# Patient Record
Sex: FEMALE | Race: BLACK OR AFRICAN AMERICAN | NOT HISPANIC OR LATINO | Employment: OTHER | URBAN - METROPOLITAN AREA
[De-identification: names, ages, dates, MRNs, and addresses within clinical notes are randomized per-mention and may not be internally consistent; named-entity substitution may affect disease eponyms.]

---

## 2017-02-20 ENCOUNTER — OFFICE VISIT (OUTPATIENT)
Dept: INTERNAL MEDICINE | Facility: CLINIC | Age: 62
End: 2017-02-20
Payer: COMMERCIAL

## 2017-02-20 VITALS
WEIGHT: 211.44 LBS | SYSTOLIC BLOOD PRESSURE: 130 MMHG | HEIGHT: 61 IN | RESPIRATION RATE: 14 BRPM | BODY MASS INDEX: 39.92 KG/M2 | DIASTOLIC BLOOD PRESSURE: 70 MMHG | HEART RATE: 64 BPM | TEMPERATURE: 98 F

## 2017-02-20 DIAGNOSIS — M25.512 LEFT SHOULDER PAIN, UNSPECIFIED CHRONICITY: Primary | ICD-10-CM

## 2017-02-20 PROCEDURE — 99213 OFFICE O/P EST LOW 20 MIN: CPT | Mod: S$GLB,,, | Performed by: INTERNAL MEDICINE

## 2017-02-20 PROCEDURE — 3078F DIAST BP <80 MM HG: CPT | Mod: S$GLB,,, | Performed by: INTERNAL MEDICINE

## 2017-02-20 PROCEDURE — 3075F SYST BP GE 130 - 139MM HG: CPT | Mod: S$GLB,,, | Performed by: INTERNAL MEDICINE

## 2017-02-20 PROCEDURE — 99999 PR PBB SHADOW E&M-EST. PATIENT-LVL IV: CPT | Mod: PBBFAC,,, | Performed by: INTERNAL MEDICINE

## 2017-02-20 PROCEDURE — 1160F RVW MEDS BY RX/DR IN RCRD: CPT | Mod: S$GLB,,, | Performed by: INTERNAL MEDICINE

## 2017-02-20 RX ORDER — HYDROCODONE BITARTRATE AND ACETAMINOPHEN 5; 325 MG/1; MG/1
1 TABLET ORAL EVERY 6 HOURS PRN
Qty: 30 TABLET | Refills: 0 | Status: SHIPPED | OUTPATIENT
Start: 2017-02-20 | End: 2017-04-18

## 2017-02-20 NOTE — PROGRESS NOTES
CC: neck and left shoulder pain  HPI:  The patient is a 61 y.o. year old female who presents to the office for neck and left shoulder pain.  Her symptoms started last Thursday while asleep.  The pain awakened her from sleep.  She reports heat and flexeril were ineffective.  She also tried aleve, bio-freeze and tramadol.  Pain is 10/10, and is a stabbing, throbbing pain.  She sleeps on the sofa.  The patient is right hand dominant.      PAST MEDICAL HISTORY:  Past Medical History   Diagnosis Date    Adjustment disorder with mixed anxiety and depressed mood     Diverticulitis of colon (without mention of hemorrhage)     Diverticulosis of colon (without mention of hemorrhage)     HLD (hyperlipidemia)     HTN (hypertension)     Lumbago     Mitral valve disorder     Mitral valve disorders(424.0)     Pain in joint, shoulder region     Sarcoidosis        SURGICAL HISTORY:  Past Surgical History   Procedure Laterality Date    Total abdominal hysterectomy w/ bilateral salpingoophorectomy      Vocal cord noduules         MEDS:  Medcard reviewed and updated    ALLERGIES: Allergy Card reviewed and updated    SOCIAL HISTORY:   The patient is a nonsmoker.    PE:   APPEARANCE: Well nourished, well developed, in no acute distress.    CHEST: Lungs clear to auscultation with unlabored respirations.  CARDIOVASCULAR: Normal S1, S2. No murmurs. No carotid bruits. No pedal edema.  ABDOMEN: Bowel sounds normal. Not distended. Soft. No tenderness or masses.   MUSCULOSKELETAL:  Pain with internal rotation of left arm.  Positive swelling of superior aspect of left shoulder.  PSYCHIATRIC: The patient is oriented to person, place, and time and has a pleasant affect.        ASSESSMENT/PLAN:  Rubia was seen today for l shoulder and neck pain.    Diagnoses and all orders for this visit:    Left shoulder pain, unspecified chronicity  -     Cancel: X-Ray Shoulder 2 or More Views Left; Future  -     Ambulatory Referral to Sports  Medicine  -     Prescribed Norco    Other orders  -     hydrocodone-acetaminophen 5-325mg (NORCO) 5-325 mg per tablet; Take 1 tablet by mouth every 6 (six) hours as needed for Pain.

## 2017-02-20 NOTE — MR AVS SNAPSHOT
[facility]   [facility]  [place] [address]  Phone: [phone]  Fax: [phone]                  Rubia Mathews   2017 2:40 PM   Office Visit    Description:  Female : 1955   Provider:  Kiki Delgado MD   Department:  [facility]           Reason for Visit     L shoulder and neck pain           Diagnoses this Visit        Comments    Left shoulder pain, unspecified chronicity    -  Primary            To Do List           Goals (5 Years of Data)     None       These Medications        Disp Refills Start End    hydrocodone-acetaminophen 5-325mg (NORCO) 5-325 mg per tablet 30 tablet 0 2017     Take 1 tablet by mouth every 6 (six) hours as needed for Pain. - Oral    Pharmacy: [facility] [address] [place] [address] AT [facility] [address] Ph #: [phone]         [facility] On Call     [facility] On Call Nurse Care Line -  Assistance  Registered nurses in the [facility] On Call Center provide clinical advisement, health education, appointment booking, and other advisory services.  Call for this free service at [phone].             Medications           Message regarding Medications     Verify the changes and/or additions to your medication regime listed below are the same as discussed with your clinician today.  If any of these changes or additions are incorrect, please notify your healthcare provider.        START taking these NEW medications        Refills    hydrocodone-acetaminophen 5-325mg (NORCO) 5-325 mg per tablet 0    Sig: Take 1 tablet by mouth every 6 (six) hours as needed for Pain.    Class: Print    Route: Oral           Verify that the below list of medications is an accurate representation of the medications you are currently taking.  If none reported, the list may be blank. If incorrect, please contact your healthcare provider. Carry this list with you in case of  "emergency.           Current Medications     amlodipine (NORVASC) 10 MG tablet Take 1 tablet (10 mg total) by mouth once daily.    carvedilol (COREG) 6.25 MG tablet Take 1 tablet (6.25 mg total) by mouth 2 (two) times daily.    cyclobenzaprine (FLEXERIL) 10 MG tablet Take 1 tablet (10 mg total) by mouth 3 (three) times daily as needed for Muscle spasms.    levocetirizine (XYZAL) 5 MG tablet Take 1 tablet (5 mg total) by mouth daily as needed (sinus or cold symptoms).    triamterene-hydrochlorothiazide 37.5-25 mg (MAXZIDE-25) 37.5-25 mg per tablet TAKE 1 TABLET BY MOUTH ONCE DAILY    hydrocodone-acetaminophen 5-325mg (NORCO) 5-325 mg per tablet Take 1 tablet by mouth every 6 (six) hours as needed for Pain.           Clinical Reference Information           Your Vitals Were     BP Pulse Temp Resp Height Weight    130/70 (BP Location: Left arm, Patient Position: Sitting, BP Method: Manual) 64 98.2 °F (36.8 °C) (Oral) 14 5' 1" (1.549 m) 95.9 kg (211 lb 6.7 oz)    BMI                39.95 kg/m2          Blood Pressure          Most Recent Value    BP  130/70      Allergies as of 2/20/2017     Ciprofloxacin    Lisinopril    Losartan    Zithromax  [Azithromycin]      Immunizations Administered on Date of Encounter - 2/20/2017     None      Orders Placed During Today's Visit      Normal Orders This Visit    Ambulatory Referral to Sports Medicine     Future Labs/Procedures Expected by Expires    X-Ray Shoulder 2 or More Views Left  2/20/2017 2/20/2018      Smoking Cessation     If you would like to quit smoking:   You may be eligible for free services if you are a [place] resident and started smoking cigarettes before September 1, 1988.  Call the Smoking Cessation Trust (SCT) toll free at (774) 075-1002 or (482) 133-5822.   Call 3-611-QUIT-NOW if you do not meet the above criteria.            Language Assistance Services     ATTENTION: Language assistance services are available, free of charge. Please call " [phone].      ATENCIÓN: Si habla español, tiene a jones disposición servicios gratuitos de asistencia lingüística. Llame al [phone].     CHÚ Ý: N?u b?n nói Ti?ng Vi?t, có các d?ch v? h? tr? ngôn ng? mi?n phí dành cho b?n. G?i s? [phone].         [facility] complies with applicable Federal civil rights laws and does not discriminate on the basis of race, color, national origin, age, disability, or sex.

## 2017-04-18 ENCOUNTER — OFFICE VISIT (OUTPATIENT)
Dept: INTERNAL MEDICINE | Facility: CLINIC | Age: 62
End: 2017-04-18
Payer: COMMERCIAL

## 2017-04-18 VITALS
BODY MASS INDEX: 39.21 KG/M2 | DIASTOLIC BLOOD PRESSURE: 79 MMHG | SYSTOLIC BLOOD PRESSURE: 120 MMHG | RESPIRATION RATE: 16 BRPM | WEIGHT: 207.69 LBS | HEART RATE: 79 BPM | HEIGHT: 61 IN | TEMPERATURE: 98 F

## 2017-04-18 DIAGNOSIS — R21 RASH AND NONSPECIFIC SKIN ERUPTION: Primary | ICD-10-CM

## 2017-04-18 PROCEDURE — 99213 OFFICE O/P EST LOW 20 MIN: CPT | Mod: S$GLB,,, | Performed by: INTERNAL MEDICINE

## 2017-04-18 PROCEDURE — 3078F DIAST BP <80 MM HG: CPT | Mod: S$GLB,,, | Performed by: INTERNAL MEDICINE

## 2017-04-18 PROCEDURE — 99999 PR PBB SHADOW E&M-EST. PATIENT-LVL III: CPT | Mod: PBBFAC,,, | Performed by: INTERNAL MEDICINE

## 2017-04-18 PROCEDURE — 1160F RVW MEDS BY RX/DR IN RCRD: CPT | Mod: S$GLB,,, | Performed by: INTERNAL MEDICINE

## 2017-04-18 PROCEDURE — 3074F SYST BP LT 130 MM HG: CPT | Mod: S$GLB,,, | Performed by: INTERNAL MEDICINE

## 2017-04-18 RX ORDER — TRIAMCINOLONE ACETONIDE 1 MG/G
CREAM TOPICAL 2 TIMES DAILY
Qty: 15 G | Refills: 0 | Status: SHIPPED | OUTPATIENT
Start: 2017-04-18 | End: 2018-02-16

## 2017-04-18 RX ORDER — NYSTATIN 100000 U/G
CREAM TOPICAL 3 TIMES DAILY
Qty: 15 G | Refills: 0 | Status: SHIPPED | OUTPATIENT
Start: 2017-04-18 | End: 2018-03-28

## 2017-04-18 NOTE — PROGRESS NOTES
CC: rash    62 yo female w/ HTN, OA, and HLD, presents for rash on left arm and breasts  Started: several months, 1st darker spot on left arm in antecubital area, it throbs @ night  2 lesions on breast that do not throb or itch  Also noted soreness along old GORDON incision  Itch: yes  Blisters: no clear , watery blister  Exudate: n/a  Contact: none known  Tx: antifungal cream/soap; triple antibiotic, HC , witch hazel ( only thing that helped)    MEDCARD: Reviewed  ROS:  No fever, chills, or night sweats  No paresthesias or change in temperature  No red streaks or abscess  ROR negative except as previously noted    PMHX:Reviewed  SHX: Reviewed  FHX: Reviewed    PE:  VSS  GEN: WDWN, A&O, NAD, conversant and co-operative  Pleasant as always  EYES: Conj /lids unremarkable; sclera nonanicteric  MSK: Gait nl  NEURO: MALDONADO< no tremor noted  SKIN: ledt antecubital area, ` 1-2 cm , well circummscribed lesion w/ slight scaly appearence and very center slightly clear; raised w/o blisters  2 smaller lesions on the left breast area, similar to arm  Scar perineal @ left distal end, scaly appearance, and hyperpigmented appearance    IMPRESSION:  Rash,  HTN, stable today    PLAN:  Rx nysttain   Rx triamcinolone 1%  Mix and apply tid  Derm Consult  Call prn

## 2017-04-30 RX ORDER — AMLODIPINE BESYLATE 10 MG/1
TABLET ORAL
Qty: 90 TABLET | Refills: 0 | Status: SHIPPED | OUTPATIENT
Start: 2017-04-30 | End: 2017-08-25 | Stop reason: SDUPTHER

## 2017-04-30 RX ORDER — CARVEDILOL 6.25 MG/1
TABLET ORAL
Qty: 180 TABLET | Refills: 0 | Status: SHIPPED | OUTPATIENT
Start: 2017-04-30 | End: 2017-08-25 | Stop reason: SDUPTHER

## 2017-05-20 RX ORDER — TRIAMTERENE/HYDROCHLOROTHIAZID 37.5-25 MG
TABLET ORAL
Qty: 90 TABLET | Refills: 1 | Status: SHIPPED | OUTPATIENT
Start: 2017-05-20 | End: 2017-11-19 | Stop reason: SDUPTHER

## 2017-07-08 ENCOUNTER — OFFICE VISIT (OUTPATIENT)
Dept: INTERNAL MEDICINE | Facility: CLINIC | Age: 62
End: 2017-07-08
Payer: COMMERCIAL

## 2017-07-08 VITALS
HEART RATE: 70 BPM | TEMPERATURE: 98 F | WEIGHT: 203.5 LBS | SYSTOLIC BLOOD PRESSURE: 143 MMHG | DIASTOLIC BLOOD PRESSURE: 82 MMHG | BODY MASS INDEX: 38.42 KG/M2 | HEIGHT: 61 IN

## 2017-07-08 DIAGNOSIS — R22.9 SUBCUTANEOUS NODULE: Primary | ICD-10-CM

## 2017-07-08 DIAGNOSIS — Z12.31 VISIT FOR SCREENING MAMMOGRAM: ICD-10-CM

## 2017-07-08 PROCEDURE — 99999 PR PBB SHADOW E&M-EST. PATIENT-LVL III: CPT | Mod: PBBFAC,,, | Performed by: INTERNAL MEDICINE

## 2017-07-08 PROCEDURE — 99214 OFFICE O/P EST MOD 30 MIN: CPT | Mod: S$GLB,,, | Performed by: INTERNAL MEDICINE

## 2017-07-08 NOTE — PROGRESS NOTES
Subjective:        Patient ID: Rubia Mathews is a 62 y.o. female.    Chief Complaint: Breast Problem (knot on left breast x 3 days)    HPI   Rubia Mathews presents with c/o a bump on the underside of her left breast that she first noticed 3 days ago.  Initially the lesion was throbbing, painful and red.  She applied warm compresses thinking it was an ingrown hair or boil and the throbbing pain has now resolved.  Pt thinks the lesion is now smaller also.  Pt reports a hx of boils and cysts on other parts of her body.  She reports doing SBEs but not at regular intervals.  She is also due for her mammogram.    Review of Systems  as per HPI      Objective:        Vitals:    07/08/17 1030   BP: (!) 143/82   Pulse: 70   Temp: 98.3 °F (36.8 °C)     Physical Exam   Constitutional: She is oriented to person, place, and time. She appears well-developed and well-nourished. No distress.   Pulmonary/Chest:       Neurological: She is alert and oriented to person, place, and time.   Skin: No rash noted. No erythema.   Vitals reviewed.          Assessment:         1. Subcutaneous nodule    2. Visit for screening mammogram              Plan:         Rubia was seen today for breast problem.    Diagnoses and all orders for this visit:    Subcutaneous nodule: Most likely furunculosis; improved w conservative treatment.  Continue warm compresses, keep skin clean and dry.  Follow up with PCP in 1 week if lesion gets worse or does not continue to improve.    Visit for screening mammogram  -     Mammo Digital Screening Bilateral With CAD; Future        Return PRN

## 2017-08-27 RX ORDER — AMLODIPINE BESYLATE 10 MG/1
TABLET ORAL
Qty: 90 TABLET | Refills: 0 | Status: SHIPPED | OUTPATIENT
Start: 2017-08-27 | End: 2017-12-14 | Stop reason: SDUPTHER

## 2017-08-27 RX ORDER — CARVEDILOL 6.25 MG/1
TABLET ORAL
Qty: 180 TABLET | Refills: 0 | Status: SHIPPED | OUTPATIENT
Start: 2017-08-27 | End: 2017-12-14 | Stop reason: SDUPTHER

## 2017-11-14 ENCOUNTER — OFFICE VISIT (OUTPATIENT)
Dept: INTERNAL MEDICINE | Facility: CLINIC | Age: 62
End: 2017-11-14
Payer: COMMERCIAL

## 2017-11-14 VITALS
TEMPERATURE: 98 F | RESPIRATION RATE: 16 BRPM | WEIGHT: 213.19 LBS | DIASTOLIC BLOOD PRESSURE: 70 MMHG | SYSTOLIC BLOOD PRESSURE: 138 MMHG | BODY MASS INDEX: 40.25 KG/M2 | HEIGHT: 61 IN | HEART RATE: 80 BPM

## 2017-11-14 DIAGNOSIS — I10 ESSENTIAL HYPERTENSION: ICD-10-CM

## 2017-11-14 DIAGNOSIS — J30.9 ACUTE ALLERGIC RHINITIS, UNSPECIFIED SEASONALITY, UNSPECIFIED TRIGGER: Primary | ICD-10-CM

## 2017-11-14 PROCEDURE — 99999 PR PBB SHADOW E&M-EST. PATIENT-LVL III: CPT | Mod: PBBFAC,,, | Performed by: INTERNAL MEDICINE

## 2017-11-14 PROCEDURE — 96372 THER/PROPH/DIAG INJ SC/IM: CPT | Mod: S$GLB,,, | Performed by: INTERNAL MEDICINE

## 2017-11-14 PROCEDURE — 99213 OFFICE O/P EST LOW 20 MIN: CPT | Mod: 25,S$GLB,, | Performed by: INTERNAL MEDICINE

## 2017-11-14 RX ORDER — TRIAMCINOLONE ACETONIDE 40 MG/ML
40 INJECTION, SUSPENSION INTRA-ARTICULAR; INTRAMUSCULAR
Status: COMPLETED | OUTPATIENT
Start: 2017-11-14 | End: 2017-11-14

## 2017-11-14 RX ADMIN — TRIAMCINOLONE ACETONIDE 40 MG: 40 INJECTION, SUSPENSION INTRA-ARTICULAR; INTRAMUSCULAR at 12:11

## 2017-11-14 NOTE — PROGRESS NOTES
CC: sore throat    62 y.o. female presents today for sore throat, more scratchy now, ++ PND  Sx started: Saurday  Sx : sore throat , PND , nasal congestion w/ some mucus- clear; itchy, watery eyes  Tx: Mucinex, salt water +ASA + pepper gargle, tea w/ honey and lemon, Zyrtec  Denied fever, chills , or night sweats  Denied ear pain or sinus pain  Denied HA or dizziness  ++ cough and wheezing  Ill contacts , none    Co- morbiditiy: HTN , tx amlodipine and carvedilol and Maxzide- BP nl today    ROS:  No nausea or emesis  No abdominal pain or diarrhea  Remainder of review negative except as previously noted    PMHX: Reviewed  PSHX: Reviewed  SHX: Reviewed===Nonsmoker  FHX: Reviewed    PE:  VS as noted  GEN:WDWN, A&O, NAD, pleasant as always  EYES: Conj/lids unremarkable, sclera anicteric, Pupils reactive  ENT: Canals w/o cerumen, TM's injected;   nasal mucosa/turbinates boggy w/ left touching w/ clear exudate  o/p     Injected, w/o exudate;   sinus NT  NECK: Supple w/o thyromegaly or lymphadenopathy  RESPIRATORY: Efforts are unlabored, lungs are CTA  CARDIOVASCULAR: Heart RRR, no LE edema  MUSCULOSKELETAL: Gait normal. No CCE.  NEUROLOGIC: MALDONADO. No tremor noted  SKIN: Warm and dry    IMPRESSION:  Allergic rhinitis, exacerbated  HTN, stable    PLAN:  Kenalog 40mg  FLonase after Ocean spray  Mucinex  Vigorous hydration  Call prn

## 2017-11-17 ENCOUNTER — TELEPHONE (OUTPATIENT)
Dept: INTERNAL MEDICINE | Facility: CLINIC | Age: 62
End: 2017-11-17

## 2017-11-17 NOTE — TELEPHONE ENCOUNTER
----- Message from Yoly Alvarado sent at 11/17/2017  8:51 AM CST -----  Contact: Pt at [phone]  Patient would like to get medical advice.  Symptoms (please be specific):  Congestion,coughing up flem  How long has patient had these symptoms: over 1 week  Pharmacy name and phone #:  [facility] [address] [place] [address] AT [facility] [address]   [phone] (Phone)  [phone] (Fax)      Any drug allergies:  See chart  Comments: Pt received a steroid injection on 11/14, but it has not helped with her symptoms.

## 2017-11-17 NOTE — TELEPHONE ENCOUNTER
Spoke to pt. Pt stated that she sometimes coughs a lot, because she feels phlegm building up. She stated that she has trouble coughing up the phlegm.  I advised that she try taking plain Mucinex OTC and increase water intake.   Pt stated that she just started that mucinex last night. She stated that she will continue, and let us know if she needs us.

## 2017-11-19 RX ORDER — TRIAMTERENE/HYDROCHLOROTHIAZID 37.5-25 MG
TABLET ORAL
Qty: 90 TABLET | Refills: 1 | Status: SHIPPED | OUTPATIENT
Start: 2017-11-19 | End: 2018-04-18 | Stop reason: SDUPTHER

## 2017-12-08 ENCOUNTER — TELEPHONE (OUTPATIENT)
Dept: INTERNAL MEDICINE | Facility: CLINIC | Age: 62
End: 2017-12-08

## 2017-12-08 NOTE — TELEPHONE ENCOUNTER
----- Message from Ana Paula Patel sent at 12/8/2017  9:13 AM CST -----  Contact: self/[phone]  Patient called in regards needing to talk with Dr Edwards medical assistant on getting an early appointment. Patient was told that she need cataract removal and need a referral (i offered the earliest appointment 01/31/18, but patient stated that she need it earlier than that). Please call and advise.       Thank you!!!

## 2017-12-08 NOTE — TELEPHONE ENCOUNTER
Spoke to pt. Pt stated that she needed to be seen by Dr. penny because her outside eye dr told her that she needed to be scheduled for an eye surgeon to have a cataract removed.  Pt scheduled to see Dr. Penny on 12/12/17.

## 2017-12-12 ENCOUNTER — OFFICE VISIT (OUTPATIENT)
Dept: INTERNAL MEDICINE | Facility: CLINIC | Age: 62
End: 2017-12-12
Payer: COMMERCIAL

## 2017-12-12 VITALS
HEIGHT: 61 IN | WEIGHT: 210.75 LBS | TEMPERATURE: 98 F | RESPIRATION RATE: 16 BRPM | BODY MASS INDEX: 39.79 KG/M2 | SYSTOLIC BLOOD PRESSURE: 144 MMHG | DIASTOLIC BLOOD PRESSURE: 86 MMHG | OXYGEN SATURATION: 99 % | HEART RATE: 87 BPM

## 2017-12-12 DIAGNOSIS — E87.6 HYPOKALEMIA: ICD-10-CM

## 2017-12-12 DIAGNOSIS — E78.5 HYPERLIPIDEMIA, UNSPECIFIED HYPERLIPIDEMIA TYPE: ICD-10-CM

## 2017-12-12 DIAGNOSIS — H26.9 CATARACT OF BOTH EYES, UNSPECIFIED CATARACT TYPE: Primary | ICD-10-CM

## 2017-12-12 DIAGNOSIS — I10 HYPERTENSION, UNSPECIFIED TYPE: ICD-10-CM

## 2017-12-12 PROCEDURE — 99999 PR PBB SHADOW E&M-EST. PATIENT-LVL III: CPT | Mod: PBBFAC,,, | Performed by: INTERNAL MEDICINE

## 2017-12-12 PROCEDURE — 99214 OFFICE O/P EST MOD 30 MIN: CPT | Mod: S$GLB,,, | Performed by: INTERNAL MEDICINE

## 2017-12-12 NOTE — PROGRESS NOTES
CC: HTN, and cataracts     62 y.o. female presents for   Cataracts, interfering w/ her vision  Nighttime driving impaired     HTN, Tx : amlodipine 10mg , carvedilol 6.25mg BID  and Maxzide  Denied HA or dizziness  BP today==>144/86    Hypokalemia, tx diet /OTC  Denied cramping or palpitations  Potassium level==> pending    HLD, tx diet  Denied angina or GUY  LDL==> pending    MEDCARD: Reviewed  ROS:  No fever, chills ,or night sweats  No chest pain or palpitations  No cough or wheezing  No focal deficits or paresthesia  Remainder of review negative except as previously noted    PMHX: Reviewed  SHX: Reviewed  FHX: Reviewed    PE:  VSS:   GEN: WDWN, A&O, NAD, conversant and co-operative  Pleasant pt  EYES: Conj/lids unremarkable, sclera anicteric, Pupils reactive  ENT: O/P w/o exudate or erythema  NECK: Supple w/o lymphadenopathy or thyromegaly  RESP: Efforts unlabored, lungs CTA  Cardiovascular: Heart RRR, no carotid bruits,   1+ pedal pulses, no edema  MSK: Gait normal. No CCE  NEURO: MALDONADO. No tremor or facial asymmetry  SKIN: warm and dry    IMPRESSION:  Cataracts, b/l  HTN, elevated  Hypokalemia, improved last check but not nl  HLD, asx    PLAN:  Mammo update requested  Continue present meds  Rx update  Lab - fasting  Avoid salt  Monitor BP and call if remains elevated  Call prn

## 2017-12-14 ENCOUNTER — TELEPHONE (OUTPATIENT)
Dept: INTERNAL MEDICINE | Facility: CLINIC | Age: 62
End: 2017-12-14

## 2017-12-14 DIAGNOSIS — H26.9 CATARACT, UNSPECIFIED CATARACT TYPE, UNSPECIFIED LATERALITY: Primary | ICD-10-CM

## 2017-12-14 RX ORDER — CARVEDILOL 6.25 MG/1
TABLET ORAL
Qty: 180 TABLET | Refills: 1 | Status: SHIPPED | OUTPATIENT
Start: 2017-12-14 | End: 2018-06-17 | Stop reason: SDUPTHER

## 2017-12-14 RX ORDER — AMLODIPINE BESYLATE 10 MG/1
TABLET ORAL
Qty: 90 TABLET | Refills: 1 | Status: SHIPPED | OUTPATIENT
Start: 2017-12-14 | End: 2018-06-17 | Stop reason: SDUPTHER

## 2017-12-14 NOTE — TELEPHONE ENCOUNTER
----- Message from Leann Olivera sent at 12/14/2017  2:00 PM CST -----  Regarding: RE: javan  Please enter order.    Thanks  ----- Message -----  From: Rohini Whitney LPN  Sent: 12/14/2017   1:51 PM  To: [facility] Referral Coordinators  Subject: javan Noriega-  Yes.  Referral to ophthalmology with Lawanda Ellis or Alyson for cataract surgery.  Thanks

## 2017-12-14 NOTE — TELEPHONE ENCOUNTER
----- Message from Leann Olivera sent at 12/14/2017 10:02 AM CST -----  Contact: Self. Call her at  [phone]  Is pt being referred?    Thanks  ----- Message -----  From: Alisia Brito  Sent: 12/14/2017   9:58 AM  To: Leann Olivera    GM, Did Dr. Penny give you a referral request for this patient to see an eye doctor for cataracts?

## 2018-01-08 ENCOUNTER — OFFICE VISIT (OUTPATIENT)
Dept: OPHTHALMOLOGY | Facility: CLINIC | Age: 63
End: 2018-01-08
Payer: COMMERCIAL

## 2018-01-08 DIAGNOSIS — H25.12 NUCLEAR SCLEROTIC CATARACT OF LEFT EYE: ICD-10-CM

## 2018-01-08 DIAGNOSIS — H25.11 NUCLEAR SCLEROTIC CATARACT OF RIGHT EYE: Primary | ICD-10-CM

## 2018-01-08 DIAGNOSIS — H52.13 MYOPIA OF BOTH EYES: ICD-10-CM

## 2018-01-08 PROCEDURE — 99999 PR PBB SHADOW E&M-EST. PATIENT-LVL II: CPT | Mod: PBBFAC,,, | Performed by: OPHTHALMOLOGY

## 2018-01-08 PROCEDURE — 92025 CPTRIZED CORNEAL TOPOGRAPHY: CPT | Mod: S$GLB,,, | Performed by: OPHTHALMOLOGY

## 2018-01-08 PROCEDURE — 92136 OPHTHALMIC BIOMETRY: CPT | Mod: RT,S$GLB,, | Performed by: OPHTHALMOLOGY

## 2018-01-08 PROCEDURE — 92004 COMPRE OPH EXAM NEW PT 1/>: CPT | Mod: S$GLB,,, | Performed by: OPHTHALMOLOGY

## 2018-01-08 RX ORDER — OFLOXACIN 3 MG/ML
1 SOLUTION/ DROPS OPHTHALMIC 3 TIMES DAILY
Qty: 5 ML | Refills: 0 | Status: SHIPPED | OUTPATIENT
Start: 2018-01-08 | End: 2018-01-18

## 2018-01-08 RX ORDER — KETOCONAZOLE 20 MG/G
CREAM TOPICAL
Refills: 10 | COMMUNITY
Start: 2017-12-14 | End: 2018-03-28

## 2018-01-08 RX ORDER — PREDNISOLONE ACETATE 10 MG/ML
1 SUSPENSION/ DROPS OPHTHALMIC 3 TIMES DAILY
Qty: 5 ML | Refills: 1 | Status: SHIPPED | OUTPATIENT
Start: 2018-01-08 | End: 2018-01-08 | Stop reason: SDUPTHER

## 2018-01-08 RX ORDER — PREDNISOLONE ACETATE 10 MG/ML
1 SUSPENSION/ DROPS OPHTHALMIC 3 TIMES DAILY
Qty: 5 ML | Refills: 1 | Status: SHIPPED | OUTPATIENT
Start: 2018-01-08 | End: 2018-02-07

## 2018-01-08 RX ORDER — OFLOXACIN 3 MG/ML
1 SOLUTION/ DROPS OPHTHALMIC 3 TIMES DAILY
Qty: 5 ML | Refills: 0 | Status: SHIPPED | OUTPATIENT
Start: 2018-01-08 | End: 2018-01-08 | Stop reason: SDUPTHER

## 2018-01-08 NOTE — LETTER
[facility]  [address]  [place] [address]  Phone: [phone]  Fax: [phone]   January 8, 2018    Jess Quintana, OD  [address] [facility] [address]    Patient: Rubia Mathews   MR Number: [id number]   YOB: 1955   Date of Visit: 1/8/2018       Dear Dr. Quintana:    Thank you for referring Rubia Mathews to me for evaluation. Here is my assessment and plan of care:    Nuclear sclerotic cataract of right eye  Nuclear sclerotic cataract of left eye  Myopia of both eyes    Visually significant nuclear sclerotic cataract OD>OS  - Interfering with activities of daily living.  Pt desires cataract surgery for [facility] rehabilitation.   - R/B/A discussed and pt agrees to proceed with surgery.   - IOL options discussed according to patient's goals and concomitant ocular pathology; and pt content with monofocal lens.    - Target: plano.    OD first    Need 2 wk CL holiday to confirm IOL calcs.     Will refer back to Dr. Quintana for post-op Mrx following cataract surgery.    Below you will find my full exam findings. If you have questions, please do not hesitate to call me. I look forward to following Ms. Rubia Mathews along with you.    Sincerely,        Veronica Ferrari MD       CC  No Recipients             Base Eye Exam     Visual Acuity (Snellen - Linear)       Right Left    Dist cc 20/70 -1 20/50 -1    Near cc J4 J4    Correction:  Contacts          Tonometry (Tonopen, 1:52 PM)       Right Left    Pressure 19 20          Pupils       Pupils React APD    Right PERRL Brisk None    Left PERRL Brisk None          Neuro/Psych     Oriented x3:  Yes    Mood/Affect:  Normal          Dilation     Both eyes:  1% Mydriacyl, 2.5% Phenylephrine @ 1:53 PM            Slit Lamp and Fundus Exam     Slit Lamp Exam       Right Left    Lids/Lashes Normal Normal    Conjunctiva/Sclera White and quiet White and quiet    Cornea Clear Clear    Anterior Chamber Deep and  quiet Deep and quiet    Iris Round and reactive Round and reactive    Lens 2+ NSC/ ant cortical cat 2+ NSC/ ant cortical cat    Vitreous Normal Normal          Fundus Exam       Right Left    Disc Normal Normal    Macula Normal Normal    Vessels Normal Normal    Periphery Normal Normal            Refraction     Manifest Refraction (Auto)       Sphere Cylinder Axis    Right -7.50 +0.25 150    Left -7.00 sph             Contact Lens Exam     Current Contact Lens Rx       Brand Sphere    Right Pure vision 2 -6.50    Left Pure vision 2 -6.50          Contact History     WTT:  24    Days/week:  2    Replacement Frequency:  Monthly    Care Regimen:  Normal    Solutions Used:  OptiFree PureMoist    Lens Age:  1m

## 2018-01-08 NOTE — PROGRESS NOTES
HPI     Ref. By Dr. Goodrich primary care    +1.25 readers    Last edited by Ej Ricks MA on 1/8/2018  1:44 PM. (History)            Assessment /Plan     For exam results, see Encounter Report.    Nuclear sclerotic cataract of right eye  -     IOL Master - OD - Right Eye  -     Computerized corneal topography    Nuclear sclerotic cataract of left eye    Myopia of both eyes    Other orders  -     prednisoLONE acetate (PRED FORTE) 1 % DrpS; Place 1 drop into the right eye 3 (three) times daily.  Dispense: 5 mL; Refill: 1  -     ofloxacin (OCUFLOX) 0.3 % ophthalmic solution; Place 1 drop into the right eye 3 (three) times daily.  Dispense: 5 mL; Refill: 0      Visually significant nuclear sclerotic cataract   - Interfering with activities of daily living.  Pt desires cataract surgery for Va rehabilitation.   - R/B/A discussed and pt agrees to proceed with surgery.   - IOL options discussed according to patient's goals and concomitant ocular pathology; and pt content with monofocal lens.    - Target: plano.    OD first    Need 2 wk CL holiday --> confirm calcs:    pcboo 12.5 OD    (pcboo 13.0 OS)    Myopia

## 2018-01-08 NOTE — LETTER
January 8, 2018      No Recipients           [facility]  [address]  [facility] [address]  Phone: [phone]  Fax: [phone]          Patient: Rubia Mathews   MR Number: [id number]   YOB: 1955   Date of Visit: 1/8/2018       Dear Dr. Stefanie Leal:    Thank you for referring Rubia Mathews to me for evaluation. Attached you will find relevant portions of my assessment and plan of care.    If you have questions, please do not hesitate to call me. I look forward to following Rubia Mathews along with you.    Sincerely,    Veronica Ferrari MD    Enclosure  CC:  No Recipients    If you would like to receive this communication electronically, please contact [email or web address] or [phone] to request more information on [facility] Link access.    For providers and/or their staff who would like to refer a patient to [facility], please contact us through our one-stop-shop provider referral line, [facility], at [phone].    If you feel you have received this communication in error or would no longer like to receive these types of communications, please e-mail [email or web address]

## 2018-01-15 ENCOUNTER — TELEPHONE (OUTPATIENT)
Dept: OPHTHALMOLOGY | Facility: CLINIC | Age: 63
End: 2018-01-15

## 2018-01-15 DIAGNOSIS — H25.11 NUCLEAR SCLEROTIC CATARACT OF RIGHT EYE: Primary | ICD-10-CM

## 2018-01-19 ENCOUNTER — OFFICE VISIT (OUTPATIENT)
Dept: URGENT CARE | Facility: CLINIC | Age: 63
End: 2018-01-19
Payer: COMMERCIAL

## 2018-01-19 VITALS
SYSTOLIC BLOOD PRESSURE: 142 MMHG | HEIGHT: 61 IN | RESPIRATION RATE: 16 BRPM | BODY MASS INDEX: 39.46 KG/M2 | TEMPERATURE: 102 F | OXYGEN SATURATION: 97 % | WEIGHT: 209 LBS | DIASTOLIC BLOOD PRESSURE: 76 MMHG | HEART RATE: 84 BPM

## 2018-01-19 DIAGNOSIS — J02.9 SORE THROAT: ICD-10-CM

## 2018-01-19 DIAGNOSIS — J11.1 INFLUENZA: Primary | ICD-10-CM

## 2018-01-19 LAB
CTP QC/QA: YES
FLUAV AG NPH QL: NEGATIVE
FLUBV AG NPH QL: NEGATIVE

## 2018-01-19 PROCEDURE — 99203 OFFICE O/P NEW LOW 30 MIN: CPT | Mod: S$GLB,,, | Performed by: PHYSICIAN ASSISTANT

## 2018-01-19 PROCEDURE — 87804 INFLUENZA ASSAY W/OPTIC: CPT | Mod: QW,S$GLB,, | Performed by: PHYSICIAN ASSISTANT

## 2018-01-19 RX ORDER — CODEINE PHOSPHATE AND GUAIFENESIN 10; 100 MG/5ML; MG/5ML
5 SOLUTION ORAL 3 TIMES DAILY PRN
Qty: 120 ML | Refills: 0 | Status: SHIPPED | OUTPATIENT
Start: 2018-01-19 | End: 2018-01-29

## 2018-01-19 RX ORDER — ONDANSETRON 4 MG/1
4 TABLET, FILM COATED ORAL EVERY 8 HOURS PRN
Qty: 30 TABLET | Refills: 0 | Status: SHIPPED | OUTPATIENT
Start: 2018-01-19 | End: 2018-03-09

## 2018-01-19 RX ORDER — OSELTAMIVIR PHOSPHATE 75 MG/1
75 CAPSULE ORAL 2 TIMES DAILY
Qty: 10 CAPSULE | Refills: 0 | Status: SHIPPED | OUTPATIENT
Start: 2018-01-19 | End: 2018-01-24

## 2018-01-19 NOTE — LETTER
January 19, 2018      [facility]  [address] [address], [address]  [place] [address]  Phone: [phone]  Fax: [phone]       Patient: Rubia Mathews   YOB: 1955  Date of Visit: 01/19/2018    To Whom It May Concern:    Tone Mathews  was at [facility] on 01/19/2018. She may return to work/school on 1/23/2018 with no restrictions. If you have any questions or concerns, or if I can be of further assistance, please do not hesitate to contact me.    Sincerely,    Adrienne Willett PA-C

## 2018-01-19 NOTE — PROGRESS NOTES
"Subjective:       Patient ID: Rubia Mathews is a 62 y.o. female.    Vitals:  height is 5' 1" (1.549 m) and weight is 94.8 kg (209 lb). Her oral temperature is 101.7 °F (38.7 °C) (abnormal). Her blood pressure is 142/76 (abnormal) and her pulse is 84. Her respiration is 16 and oxygen saturation is 97%.     Chief Complaint: Sore Throat    Sore Throat    This is a new problem. The current episode started in the past 7 days. The problem has been gradually worsening. Neither side of throat is experiencing more pain than the other. There has been no fever. The pain is at a severity of 2/10. The pain is mild. Associated symptoms include coughing. Pertinent negatives include no abdominal pain, congestion, ear pain, headaches, hoarse voice or shortness of breath. She has tried acetaminophen for the symptoms. The treatment provided no relief.     Review of Systems   Constitution: Positive for chills. Negative for fever and malaise/fatigue.   HENT: Positive for sore throat. Negative for congestion, ear pain and hoarse voice.    Eyes: Negative for discharge and redness.   Cardiovascular: Negative for chest pain, dyspnea on exertion and leg swelling.   Respiratory: Positive for cough. Negative for shortness of breath, sputum production and wheezing.    Musculoskeletal: Negative for myalgias.   Gastrointestinal: Negative for abdominal pain and nausea.   Neurological: Negative for headaches.       Objective:      Physical Exam   Constitutional: She is oriented to person, place, and time. She appears well-developed and well-nourished. She is cooperative.  Non-toxic appearance. She does not appear ill. No distress.   HENT:   Head: Normocephalic and atraumatic.   Right Ear: Hearing, external ear and ear canal normal. Tympanic membrane is erythematous.   Left Ear: Hearing, external ear and ear canal normal. Tympanic membrane is erythematous.   Nose: Rhinorrhea present. No mucosal edema or nasal deformity. No epistaxis. Right " sinus exhibits no maxillary sinus tenderness and no frontal sinus tenderness. Left sinus exhibits no maxillary sinus tenderness and no frontal sinus tenderness.   Mouth/Throat: Uvula is midline and mucous membranes are normal. No trismus in the jaw. Normal dentition. No uvula swelling. Posterior oropharyngeal erythema present. No tonsillar exudate.   Eyes: Conjunctivae and lids are normal. No scleral icterus.   Sclera clear bilat   Neck: Trachea normal, full passive range of motion without pain and phonation normal. Neck supple.   Cardiovascular: Normal rate, regular rhythm, normal heart sounds, intact distal pulses and normal pulses.    Pulmonary/Chest: Effort normal and breath sounds normal. No respiratory distress.   Abdominal: Soft. Normal appearance and bowel sounds are normal. She exhibits no distension. There is no tenderness.   Musculoskeletal: Normal range of motion. She exhibits no edema or deformity.   Neurological: She is alert and oriented to person, place, and time. She exhibits normal muscle tone. Coordination normal.   Skin: Skin is warm, dry and intact. She is not diaphoretic. No pallor.   Psychiatric: She has a normal mood and affect. Her speech is normal and behavior is normal. Judgment and thought content normal. Cognition and memory are normal.   Nursing note and vitals reviewed.      Assessment:       1. Influenza    2. Sore throat        Plan:         Influenza  -     oseltamivir (TAMIFLU) 75 MG capsule; Take 1 capsule (75 mg total) by mouth 2 (two) times daily.  Dispense: 10 capsule; Refill: 0  -     ondansetron (ZOFRAN, AS HYDROCHLORIDE,) 4 MG tablet; Take 1 tablet (4 mg total) by mouth every 8 (eight) hours as needed for Nausea.  Dispense: 30 tablet; Refill: 0  -     guaifenesin-codeine 100-10 mg/5 ml (TUSSI-ORGANIDIN NR)  mg/5 mL syrup; Take 5 mLs by mouth 3 (three) times daily as needed for Cough.  Dispense: 120 mL; Refill: 0    Sore throat  -     POCT Influenza A/B        The Flu  (Influenza)     The virus that causes the flu spreads through the air in droplets when someone who has the flu coughs, sneezes, laughs, or talks.   The flu (influenza) is an infection that affects your respiratory tract. This tract is made up of your mouth, nose, and lungs, and the passages between them. Unlike a cold, the flu can make you very ill. And it can lead to pneumonia, a serious lung infection. The flu can have serious complications and even cause death.  Who is at risk for the flu?  Anyone can get the flu. But you are more likely to become infected if you:  · Have a weakened immune system  · Work in a healthcare setting where you may be exposed to flu germs  · Live or work with someone who has the flu  · Havent had an annual flu shot  How does the flu spread?  The flu is caused by a virus. The virus spreads through the air in droplets when someone who has the flu coughs, sneezes, laughs, or talks. You can become infected when you inhale these viruses directly. You can also become infected when you touch a surface on which the droplets have landed and then transfer the germs to your eyes, nose, or mouth. Touching used tissues, or sharing utensils, drinking glasses, or a toothbrush from an infected person can expose you to flu viruses, too.  What are the symptoms of the flu?  Flu symptoms tend to come on quickly and may last a few days to a few weeks. They include:  · Fever usually higher than 100.4°F  (38°C) and chills  · Sore throat and headache  · Dry cough  · Runny nose  · Tiredness and weakness  · Muscle aches  Who is at risk for flu complications?  For some people, the flu can be very serious. The risk for complications is greater for:  · Children younger than age 5  · Adults ages 65 and older  · People with a chronic illness such as diabetes or heart, kidney, or lung disease  · People who live in a nursing home or long-term care facility   How is the flu treated?  The flu usually gets better after 7  days or so. In some cases, your healthcare provider may prescribe an antiviral medicine. This may help you get well a little sooner. For the medicine to help, you need to take it as soon as possible (ideally within 48 hours) after your symptoms start. If you develop pneumonia or other serious illness, you may need to stay in the hospital.  Easing flu symptoms  · Drink lots of fluids such as water, juice, and warm soup. A good rule is to drink enough so that you urinate your normal amount.  · Get plenty of rest.  · Ask your healthcare provider what to take for fever and pain.  · Call your provider if your fever is 100.4°F (38°C) or higher, or you become dizzy, lightheaded, or short of breath.  Taking steps to protect others  · Wash your hands often, especially after coughing or sneezing. Or clean your hands with an alcohol-based hand  containing at least 60% alcohol.  · Cough or sneeze into a tissue. Then throw the tissue away and wash your hands. If you dont have a tissue, cough and sneeze into your elbow.  · Stay home until at least 24 hours after you no longer have a fever or chills. Be sure the fever isnt being hidden by fever-reducing medicine.  · Dont share food, utensils, drinking glasses, or a toothbrush with others.  · Ask your healthcare provider if others in your household should get antiviral medicine to help them avoid infection.  How can the flu be prevented?  · One of the best ways to avoid the flu is to get a flu vaccine each year. The virus that causes the flu changes from year to year. For that reason, healthcare providers recommend getting the flu vaccine each year, as soon as it's available in your area. The vaccine is given as a shot. Your healthcare provider can tell you which vaccine is right for you. A nasal spray is also available but is not recommended for the 1201-7796 flu season. The CDC says this is because the nasal spray did not seem to protect against the flu over the last  several flu seasons. In the past, it was meant for people ages 2 to 49.  · Wash your hands often. Frequent handwashing is a proven way to help prevent infection.  · Carry an alcohol-based hand gel containing at least 60% alcohol. Use it when you can't use soap and water. Then wash your hands as soon as you can.  · Avoid touching your eyes, nose, and mouth.  · At home and work, clean phones, computer keyboards, and toys often with disinfectant wipes.  · If possible, avoid close contact with others who have the flu or symptoms of the flu.  Handwashing tips  Handwashing is one of the best ways to prevent many common infections. If you are caring for or visiting someone with the flu, wash your hands each time you enter and leave the room. Follow these steps:  · Use warm water and plenty of soap. Rub your hands together well.  · Clean the whole hand, including under your nails, between your fingers, and up the wrists.  · Wash for at least 15 seconds.  · Rinse, letting the water run down your fingers, not up your wrists.  · Dry your hands well. Use a paper towel to turn off the faucet and open the door.  Using alcohol-based hand   Alcohol-based hand  are also a good choice. Use them when you can't use soap and water. Follow these steps:  · Squeeze about a tablespoon of gel into the palm of one hand.  · Rub your hands together briskly, cleaning the backs of your hands, the palms, between your fingers, and up the wrists.  · Rub until the gel is gone and your hands are completely dry.  Preventing the flu in healthcare settings  The flu is a special concern for people in hospitals and long-term care facilities. To help prevent the spread of flu, many hospitals and nursing homes take these steps:  · Healthcare providers wash their hands or use an alcohol-based hand  before and after treating each patient.  · People with the flu have private rooms and bathrooms or share a room with someone with the same  infection.  · People who are at high risk for the flu but don't have it are encouraged to get the flu and pneumonia vaccines.  · All healthcare workers are encouraged or required to get flu shots.   Date Last Reviewed: 12/1/2016  © 3858-0425 [employer]. [address] [place] [address]. All rights reserved. This information is not intended as a substitute for professional medical care. Always follow your healthcare professional's instructions.      Please follow up with your Primary care provider within 2-5 days if your signs and symptoms have not resolved or worsen.     If your condition worsens or fails to improve we recommend that you receive another evaluation at the emergency room immediately or contact your primary medical clinic to discuss your concerns.   You must understand that you have received an Urgent Care treatment only and that you may be released before all of your medical problems are known or treated. You, the patient, will arrange for follow up care as instructed.

## 2018-01-19 NOTE — PATIENT INSTRUCTIONS
The Flu (Influenza)     The virus that causes the flu spreads through the air in droplets when someone who has the flu coughs, sneezes, laughs, or talks.   The flu (influenza) is an infection that affects your respiratory tract. This tract is made up of your mouth, nose, and lungs, and the passages between them. Unlike a cold, the flu can make you very ill. And it can lead to pneumonia, a serious lung infection. The flu can have serious complications and even cause death.  Who is at risk for the flu?  Anyone can get the flu. But you are more likely to become infected if you:  · Have a weakened immune system  · Work in a healthcare setting where you may be exposed to flu germs  · Live or work with someone who has the flu  · Havent had an annual flu shot  How does the flu spread?  The flu is caused by a virus. The virus spreads through the air in droplets when someone who has the flu coughs, sneezes, laughs, or talks. You can become infected when you inhale these viruses directly. You can also become infected when you touch a surface on which the droplets have landed and then transfer the germs to your eyes, nose, or mouth. Touching used tissues, or sharing utensils, drinking glasses, or a toothbrush from an infected person can expose you to flu viruses, too.  What are the symptoms of the flu?  Flu symptoms tend to come on quickly and may last a few days to a few weeks. They include:  · Fever usually higher than 100.4°F  (38°C) and chills  · Sore throat and headache  · Dry cough  · Runny nose  · Tiredness and weakness  · Muscle aches  Who is at risk for flu complications?  For some people, the flu can be very serious. The risk for complications is greater for:  · Children younger than age 5  · Adults ages 65 and older  · People with a chronic illness such as diabetes or heart, kidney, or lung disease  · People who live in a nursing home or long-term care facility   How is the flu treated?  The flu usually gets  better after 7 days or so. In some cases, your healthcare provider may prescribe an antiviral medicine. This may help you get well a little sooner. For the medicine to help, you need to take it as soon as possible (ideally within 48 hours) after your symptoms start. If you develop pneumonia or other serious illness, you may need to stay in the hospital.  Easing flu symptoms  · Drink lots of fluids such as water, juice, and warm soup. A good rule is to drink enough so that you urinate your normal amount.  · Get plenty of rest.  · Ask your healthcare provider what to take for fever and pain.  · Call your provider if your fever is 100.4°F (38°C) or higher, or you become dizzy, lightheaded, or short of breath.  Taking steps to protect others  · Wash your hands often, especially after coughing or sneezing. Or clean your hands with an alcohol-based hand  containing at least 60% alcohol.  · Cough or sneeze into a tissue. Then throw the tissue away and wash your hands. If you dont have a tissue, cough and sneeze into your elbow.  · Stay home until at least 24 hours after you no longer have a fever or chills. Be sure the fever isnt being hidden by fever-reducing medicine.  · Dont share food, utensils, drinking glasses, or a toothbrush with others.  · Ask your healthcare provider if others in your household should get antiviral medicine to help them avoid infection.  How can the flu be prevented?  · One of the best ways to avoid the flu is to get a flu vaccine each year. The virus that causes the flu changes from year to year. For that reason, healthcare providers recommend getting the flu vaccine each year, as soon as it's available in your area. The vaccine is given as a shot. Your healthcare provider can tell you which vaccine is right for you. A nasal spray is also available but is not recommended for the 6366-7219 flu season. The CDC says this is because the nasal spray did not seem to protect against the flu  over the last several flu seasons. In the past, it was meant for people ages 2 to 49.  · Wash your hands often. Frequent handwashing is a proven way to help prevent infection.  · Carry an alcohol-based hand gel containing at least 60% alcohol. Use it when you can't use soap and water. Then wash your hands as soon as you can.  · Avoid touching your eyes, nose, and mouth.  · At home and work, clean phones, computer keyboards, and toys often with disinfectant wipes.  · If possible, avoid close contact with others who have the flu or symptoms of the flu.  Handwashing tips  Handwashing is one of the best ways to prevent many common infections. If you are caring for or visiting someone with the flu, wash your hands each time you enter and leave the room. Follow these steps:  · Use warm water and plenty of soap. Rub your hands together well.  · Clean the whole hand, including under your nails, between your fingers, and up the wrists.  · Wash for at least 15 seconds.  · Rinse, letting the water run down your fingers, not up your wrists.  · Dry your hands well. Use a paper towel to turn off the faucet and open the door.  Using alcohol-based hand   Alcohol-based hand  are also a good choice. Use them when you can't use soap and water. Follow these steps:  · Squeeze about a tablespoon of gel into the palm of one hand.  · Rub your hands together briskly, cleaning the backs of your hands, the palms, between your fingers, and up the wrists.  · Rub until the gel is gone and your hands are completely dry.  Preventing the flu in healthcare settings  The flu is a special concern for people in hospitals and long-term care facilities. To help prevent the spread of flu, many hospitals and nursing homes take these steps:  · Healthcare providers wash their hands or use an alcohol-based hand  before and after treating each patient.  · People with the flu have private rooms and bathrooms or share a room with someone  with the same infection.  · People who are at high risk for the flu but don't have it are encouraged to get the flu and pneumonia vaccines.  · All healthcare workers are encouraged or required to get flu shots.   Date Last Reviewed: 12/1/2016  © 3769-0829 [employer]. [address] [place] [address]. All rights reserved. This information is not intended as a substitute for professional medical care. Always follow your healthcare professional's instructions.      Please follow up with your Primary care provider within 2-5 days if your signs and symptoms have not resolved or worsen.     If your condition worsens or fails to improve we recommend that you receive another evaluation at the emergency room immediately or contact your primary medical clinic to discuss your concerns.   You must understand that you have received an Urgent Care treatment only and that you may be released before all of your medical problems are known or treated. You, the patient, will arrange for follow up care as instructed.

## 2018-02-16 ENCOUNTER — OFFICE VISIT (OUTPATIENT)
Dept: INTERNAL MEDICINE | Facility: CLINIC | Age: 63
End: 2018-02-16
Payer: COMMERCIAL

## 2018-02-16 VITALS
BODY MASS INDEX: 39.41 KG/M2 | TEMPERATURE: 98 F | HEIGHT: 61 IN | WEIGHT: 208.75 LBS | RESPIRATION RATE: 16 BRPM | DIASTOLIC BLOOD PRESSURE: 88 MMHG | HEART RATE: 64 BPM | SYSTOLIC BLOOD PRESSURE: 138 MMHG

## 2018-02-16 DIAGNOSIS — S23.9XXA THORACIC BACK SPRAIN, INITIAL ENCOUNTER: Primary | ICD-10-CM

## 2018-02-16 DIAGNOSIS — M62.838 MUSCLE SPASM: ICD-10-CM

## 2018-02-16 PROCEDURE — 99214 OFFICE O/P EST MOD 30 MIN: CPT | Mod: 25,S$GLB,, | Performed by: INTERNAL MEDICINE

## 2018-02-16 PROCEDURE — 3008F BODY MASS INDEX DOCD: CPT | Mod: S$GLB,,, | Performed by: INTERNAL MEDICINE

## 2018-02-16 PROCEDURE — 99999 PR PBB SHADOW E&M-EST. PATIENT-LVL IV: CPT | Mod: PBBFAC,,, | Performed by: INTERNAL MEDICINE

## 2018-02-16 PROCEDURE — 96372 THER/PROPH/DIAG INJ SC/IM: CPT | Mod: S$GLB,,, | Performed by: INTERNAL MEDICINE

## 2018-02-16 RX ORDER — KETOROLAC TROMETHAMINE 30 MG/ML
30 INJECTION, SOLUTION INTRAMUSCULAR; INTRAVENOUS
Status: COMPLETED | OUTPATIENT
Start: 2018-02-16 | End: 2018-02-16

## 2018-02-16 RX ORDER — HYDROCODONE BITARTRATE AND ACETAMINOPHEN 5; 325 MG/1; MG/1
1 TABLET ORAL EVERY 8 HOURS PRN
Qty: 10 TABLET | Refills: 0 | Status: SHIPPED | OUTPATIENT
Start: 2018-02-16 | End: 2019-03-19 | Stop reason: ALTCHOICE

## 2018-02-16 RX ORDER — METHOCARBAMOL 500 MG/1
500 TABLET, FILM COATED ORAL 3 TIMES DAILY PRN
Qty: 30 TABLET | Refills: 0 | Status: SHIPPED | OUTPATIENT
Start: 2018-02-16 | End: 2018-02-26

## 2018-02-16 RX ORDER — TRIAMCINOLONE ACETONIDE 40 MG/ML
40 INJECTION, SUSPENSION INTRA-ARTICULAR; INTRAMUSCULAR ONCE
Status: COMPLETED | OUTPATIENT
Start: 2018-02-16 | End: 2018-02-16

## 2018-02-16 RX ADMIN — TRIAMCINOLONE ACETONIDE 40 MG: 40 INJECTION, SUSPENSION INTRA-ARTICULAR; INTRAMUSCULAR at 12:02

## 2018-02-16 RX ADMIN — KETOROLAC TROMETHAMINE 30 MG: 30 INJECTION, SOLUTION INTRAMUSCULAR; INTRAVENOUS at 12:02

## 2018-02-16 NOTE — PATIENT INSTRUCTIONS
Back Spasm (No Trauma)    Spasm of the back muscles can occur after a sudden forceful twisting or bending force (such as in a car accident), after a simple awkward movement, or after lifting something heavy with poor body positioning. In any case, muscle spasm adds to the pain. Sleeping in an awkward position or on a poor quality mattress can also cause this. Some people respond to emotional stress by tensing the muscles of their back.  Pain that continues may need further evaluation or other types of treatment such as physical therapy.  You don't always need X-rays for the initial evaluation of back pain, unless you had a physical injury such as from a car accident or fall. If your pain continues and doesn't respond to medical treatment, X-rays and other tests may then be done.   Home care  · As soon as possible, start sitting or walking again to avoid problems from prolonged bed rest (muscle weakness, worsening back stiffness and pain, blood clots in the legs).  · When in bed, try to find a position of comfort. A firm mattress is best. Try lying flat on your back with pillows under your knees. You can also try lying on your side with your knees bent up toward your chest and a pillow between your knees.  · Avoid prolonged sitting, long car rides, or travel. This puts more stress on the lower back than standing or walking.   · During the first 24 to 72 hours after an injury or flare-up, apply an ice pack to the painful area for 20 minutes, then remove it for 20 minutes. Do this over a period of 60 to 90 minutes or several times a day. This will reduce swelling and pain. Always wrap ice packs in a thin towel.  · You can start with ice, then switch to heat. Heat (hot shower, hot bath, or heating pad) reduces pain, and works well for muscle spasms. Apply heat to the painful area for 20 minutes, then remove it for 20 minutes. Do this over a period of 60 to 90 minutes or several times a day. Do not sleep on a heating  pad as it can burn or damage skin.  · Alternate ice and heat therapies.  · Be aware of safe lifting methods and do not lift anything over 15 pounds until all the pain is gone.  Gentle stretching will help your back heal faster. Do this simple routine 2 to 3 times a day until your back is feeling better.  · Lie on your back with your knees bent and both feet on the ground  · Slowly raise your left knee to your chest as you flatten your lower back against the floor. Hold for 20 to 30 seconds.  · Relax and repeat the exercise with your right knee.  · Do 2 to 3 of these exercises for each leg.  · Repeat, hugging both knees to your chest at the same time.  · Do not bounce, but use a gentle pull.  Medicines  Talk to your doctor before using medicine, especially if you have other medical problems or are taking other medicines.  You may use acetaminophen or ibuprofen to control pain, unless your healthcare provider prescribed another pain medicine. If you have a chronic condition such as diabetes, liver or kidney disease, stomach ulcer, or gastrointestinal bleeding, or are taking blood thinners, talk with your healthcare provider before taking any medicines.  Be careful if you are given prescription pain medicine, narcotics, or medicine for muscle spasm. They can cause drowsiness, affect your coordination, reflexes, or judgment. Do not drive or operate heavy machinery when taking these medicines. Take pain medicine only as prescribed by your healthcare provider.  Follow-up care  Follow up with your doctor, or as advised. Physical therapy or further tests may be needed.  If X-rays were taken, they may be reviewed by a radiologist. You will be notified of any new findings that may affect your care.  Call 911  Seek emergency medical care if any of these occur:  · Trouble breathing  · Confusion  · Drowsiness or trouble awakening  · Fainting or loss of consciousness  · Rapid or very slow heart rate  · Loss of bowel or bladder  control  When to seek medical advice  Call your healthcare provider right away if any of these occur:  · Pain becomes worse or spreads to your legs  · Weakness or numbness in one or both legs  · Numbness in the groin or genital area  · Unexplained fever over 100.4ºF (38.0ºC)  · Burning or pain when passing urine  Date Last Reviewed: 6/1/2016  © 2542-6711 [employer]. [address] [place] [address], [place] [id number]. All rights reserved. This information is not intended as a substitute for professional medical care. Always follow your healthcare professional's instructions.

## 2018-02-16 NOTE — LETTER
February 16, 2018      [facility]  [address] [facility]  [place] [address]  Phone: [phone]  Fax: [phone]       Patient: Rubia Mathews   YOB: 1955  Date of Visit: 02/16/2018    To Whom It May Concern:    Tone Mathews  was at [facility] on 02/16/2018. Please excuse her from work as she has an acute back spasm.  She may return to work/school on 2/19/18 with no restrictions. If you have any questions or concerns, or if I can be of further assistance, please do not hesitate to contact me.    Sincerely,        Sadie Ledesma MD

## 2018-02-16 NOTE — PROGRESS NOTES
Subjective:       Patient ID: Rubia Mathews is a 62 y.o. female.    Chief Complaint: Back Pain (upper)    HPI   Patient presenting with pain in back since yesterday afternoon.  She notes pain in upper back with spasms.  She has had similar episode in the past.  She denies any significant trauma or falls.  She denies any numbness or tingling down her legs.  She denies any loss of bowel or bladder.  She denies any nausea, vomiting, diarrhea, cough or abdominal pain.  She denies any fevers or chills.  She did try some Shonda back relief with no improvement.    Review of Systems   Constitutional: Negative for activity change, chills and fever.   HENT: Negative for congestion, sinus pain, sinus pressure and sore throat.    Eyes: Negative for pain and redness.   Respiratory: Negative for cough and shortness of breath.    Cardiovascular: Negative for chest pain.   Gastrointestinal: Negative for abdominal pain, constipation, nausea and vomiting.   Genitourinary: Negative for difficulty urinating.   Musculoskeletal: Positive for arthralgias and back pain. Negative for joint swelling.   Skin: Negative for rash.   Neurological: Negative for dizziness and light-headedness.   Psychiatric/Behavioral: Negative for dysphoric mood and sleep disturbance.     Objective:     Vitals:    02/16/18 1154   BP: 138/88   Pulse: 64   Resp: 16   Temp: 98.3 °F (36.8 °C)    Body mass index is 39.45 kg/m².  Physical Exam   Constitutional: She is oriented to person, place, and time. She appears well-developed and well-nourished. She appears distressed (appears in pain).   HENT:   Head: Normocephalic and atraumatic.   Mouth/Throat: Oropharynx is clear and moist.   Eyes: Conjunctivae are normal. Pupils are equal, round, and reactive to light.   Neck: Normal range of motion. No tracheal deviation present. No thyromegaly present.   Cardiovascular: Normal rate, regular rhythm, normal heart sounds and intact distal pulses.  Exam reveals no  gallop and no friction rub.    No murmur heard.  Abdominal: Soft. Bowel sounds are normal. There is no tenderness. There is no guarding.   Musculoskeletal: She exhibits tenderness.        Thoracic back: She exhibits tenderness, pain and spasm.   Pain throughout thoracic spine and paraspinal muscles, +tenderness and spasms   Lymphadenopathy:     She has no cervical adenopathy.   Neurological: She is alert and oriented to person, place, and time.   Skin: Skin is warm and dry. No rash noted.   Psychiatric: She has a normal mood and affect. Judgment normal.     Assessment:     1. Thoracic back sprain, initial encounter    2. Muscle spasm      Plan:   1. Thoracic back sprain, initial encounter  - methocarbamol (ROBAXIN) 500 MG Tab; Take 1 tablet (500 mg total) by mouth 3 (three) times daily as needed (muscle spasms).  Dispense: 30 tablet; Refill: 0    2. Muscle spasm  - Recommend trialing heat, continue over-the-counter NSAIDs  - methocarbamol (ROBAXIN) 500 MG Tab; Take 1 tablet (500 mg total) by mouth 3 (three) times daily as needed (muscle spasms).  Dispense: 30 tablet; Refill: 0  - hydrocodone-acetaminophen 5-325mg (NORCO) 5-325 mg per tablet; Take 1 tablet by mouth every 8 (eight) hours as needed for Pain.  Dispense: 10 tablet; Refill: 0  - triamcinolone acetonide injection 40 mg; Inject 1 mL (40 mg total) into the muscle once.  - ketorolac injection 30 mg; Inject 30 mg into the muscle one time.  - Checked  for LA, AL, [place], MS, Tx.  Patient has no prescriptions that are concerning.    - ED precautions provided      Patient is to call or return to clinic if symptoms worsen or fail to improve.

## 2018-03-09 ENCOUNTER — OFFICE VISIT (OUTPATIENT)
Dept: OPHTHALMOLOGY | Facility: CLINIC | Age: 63
End: 2018-03-09
Payer: COMMERCIAL

## 2018-03-09 DIAGNOSIS — H25.12 NUCLEAR SCLEROTIC CATARACT OF LEFT EYE: ICD-10-CM

## 2018-03-09 DIAGNOSIS — H25.11 NUCLEAR SCLEROTIC CATARACT OF RIGHT EYE: Primary | ICD-10-CM

## 2018-03-09 PROCEDURE — 99499 UNLISTED E&M SERVICE: CPT | Mod: S$GLB,,, | Performed by: OPHTHALMOLOGY

## 2018-03-09 PROCEDURE — 99999 PR PBB SHADOW E&M-EST. PATIENT-LVL II: CPT | Mod: PBBFAC,,, | Performed by: OPHTHALMOLOGY

## 2018-03-09 RX ORDER — OFLOXACIN 3 MG/ML
1 SOLUTION/ DROPS OPHTHALMIC 3 TIMES DAILY
Qty: 5 ML | Refills: 0 | Status: SHIPPED | OUTPATIENT
Start: 2018-03-09 | End: 2018-03-19

## 2018-03-09 RX ORDER — PREDNISOLONE ACETATE 10 MG/ML
1 SUSPENSION/ DROPS OPHTHALMIC 3 TIMES DAILY
Qty: 5 ML | Refills: 1 | Status: SHIPPED | OUTPATIENT
Start: 2018-03-09 | End: 2018-04-08

## 2018-03-09 NOTE — PROGRESS NOTES
HPI     Dr. Jess Quintana, OD at [facility]    Myopia , +1.25 readers    2 wk CL holiday --> confirm calcs:     pcboo 12.5 OD     (pcboo 13.0 OS)     Myopia       Last edited by Veronica Ferrari MD on 3/9/2018  8:43 AM. (History)            Assessment /Plan     For exam results, see Encounter Report.    Nuclear sclerotic cataract of right eye    Nuclear sclerotic cataract of left eye    Other orders  -     prednisoLONE acetate (PRED FORTE) 1 % DrpS; Place 1 drop into the right eye 3 (three) times daily.  Dispense: 5 mL; Refill: 1  -     ofloxacin (OCUFLOX) 0.3 % ophthalmic solution; Place 1 drop into the right eye 3 (three) times daily.  Dispense: 5 mL; Refill: 0          Visually significant nuclear sclerotic cataract   - Interfering with activities of daily living.  Pt desires cataract surgery for [facility] rehabilitation.   - R/B/A discussed and pt agrees to proceed with surgery.   - IOL options discussed according to patient's goals and concomitant ocular pathology; and pt content with monofocal lens.    - Target: plano.    OD first      pcboo 12.5 OD    (pcboo 12.5 or 13.0 OS)    Myopia

## 2018-03-27 NOTE — H&P
History    Chief complaint:  Painless progressive vision loss    Present Ilness/Diagnosis: Nuclear sclerotic Cataract    Past Medical History:  has a past medical history of Adjustment disorder with mixed anxiety and depressed mood; Diverticulitis of colon (without mention of hemorrhage)(562.11); Diverticulosis of colon (without mention of hemorrhage); HLD (hyperlipidemia); HTN (hypertension); Lumbago; Mitral valve disorder; Mitral valve disorders(424.0); Pain in joint, shoulder region; and Sarcoidosis.    Family History/Social History: refer to chart    Allergies:   Review of patient's allergies indicates:   Allergen Reactions    Ciprofloxacin      Other reaction(s): visual disturbance    Lisinopril      Other reaction(s): coughing    Losartan      Other reaction(s): Swelling  Other reaction(s): Rash  Other reaction(s): Hives    Zithromax  [azithromycin]      Other reaction(s): Diarrhea       Current Medications: No current facility-administered medications for this encounter.     Current Outpatient Prescriptions:     amLODIPine (NORVASC) 10 MG tablet, TAKE 1 TABLET (10 MG TOTAL) BY MOUTH ONCE DAILY., Disp: 90 tablet, Rfl: 1    carvedilol (COREG) 6.25 MG tablet, TAKE 1 TABLET (6.25 MG TOTAL) BY MOUTH 2 (TWO) TIMES DAILY., Disp: 180 tablet, Rfl: 1    hydrocodone-acetaminophen 5-325mg (NORCO) 5-325 mg per tablet, Take 1 tablet by mouth every 8 (eight) hours as needed for Pain., Disp: 10 tablet, Rfl: 0    ketoconazole (NIZORAL) 2 % cream, USE TOPICALLY ON RED FLAKY RASH ON FACE, SIDES OF NOSE AND BROW AREA 1-2 TIMES DAILY, Disp: , Rfl: 10    nystatin (MYCOSTATIN) cream, Apply topically 3 (three) times daily., Disp: 15 g, Rfl: 0    prednisoLONE acetate (PRED FORTE) 1 % DrpS, Place 1 drop into the right eye 3 (three) times daily., Disp: 5 mL, Rfl: 1    triamterene-hydrochlorothiazide 37.5-25 mg (MAXZIDE-25) 37.5-25 mg per tablet, TAKE 1 TABLET BY MOUTH ONCE DAILY, Disp: 90 tablet, Rfl: 1    Physical  Exam    BP: Vital signs stable  General: No apparent distress  HEENT: nuclear sclerotic cataract  Lungs: adequate respirations  Heart: + pulses  Abdomen: soft  Rectal/pelvic: deferred    Impression: Visually significant Cataract    Plan: Phacoemulsification with implantation of Intraocular lens

## 2018-03-28 ENCOUNTER — TELEPHONE (OUTPATIENT)
Dept: OPHTHALMOLOGY | Facility: CLINIC | Age: 63
End: 2018-03-28

## 2018-03-28 NOTE — PRE-PROCEDURE INSTRUCTIONS
Preop instructions: NPO after midnight or 8 hours prior to procedure time, shower instructions, directions, leave all valuables at home, medication instructions for PM prior & am of procedure explained. Patient stated an understanding.  Patient denies any side effects or issues with anesthesia or sedation.  Patient does not know arrival time. Explained that this information comes from the surgeons office and if they have not heard from them by 3pm to call office. Patient stated an understanding.  informed patient that instructions apply to second eye surgery scheduled in 2 weeks. Stated an understanding.

## 2018-03-29 ENCOUNTER — HOSPITAL ENCOUNTER (OUTPATIENT)
Facility: HOSPITAL | Age: 63
Discharge: HOME | End: 2018-03-29
Attending: OPHTHALMOLOGY | Admitting: OPHTHALMOLOGY
Payer: COMMERCIAL

## 2018-03-29 ENCOUNTER — ANESTHESIA EVENT (OUTPATIENT)
Dept: SURGERY | Facility: HOSPITAL | Age: 63
End: 2018-03-29
Payer: COMMERCIAL

## 2018-03-29 ENCOUNTER — ANESTHESIA (OUTPATIENT)
Dept: SURGERY | Facility: HOSPITAL | Age: 63
End: 2018-03-29
Payer: COMMERCIAL

## 2018-03-29 VITALS
DIASTOLIC BLOOD PRESSURE: 65 MMHG | SYSTOLIC BLOOD PRESSURE: 141 MMHG | HEIGHT: 61 IN | HEART RATE: 70 BPM | TEMPERATURE: 97 F | OXYGEN SATURATION: 99 % | RESPIRATION RATE: 18 BRPM | WEIGHT: 209 LBS | BODY MASS INDEX: 39.46 KG/M2

## 2018-03-29 DIAGNOSIS — H25.10 SENILE NUCLEAR SCLEROSIS: ICD-10-CM

## 2018-03-29 DIAGNOSIS — H25.11 NUCLEAR SCLEROTIC CATARACT OF RIGHT EYE: Primary | ICD-10-CM

## 2018-03-29 PROCEDURE — D9220A PRA ANESTHESIA: Mod: ANES,,, | Performed by: ANESTHESIOLOGY

## 2018-03-29 PROCEDURE — 71000015 HC POSTOP RECOV 1ST HR: Performed by: OPHTHALMOLOGY

## 2018-03-29 PROCEDURE — 63600175 PHARM REV CODE 636 W HCPCS: Performed by: NURSE ANESTHETIST, CERTIFIED REGISTERED

## 2018-03-29 PROCEDURE — 37000008 HC ANESTHESIA 1ST 15 MINUTES: Performed by: OPHTHALMOLOGY

## 2018-03-29 PROCEDURE — D9220A PRA ANESTHESIA: Mod: CRNA,,, | Performed by: NURSE ANESTHETIST, CERTIFIED REGISTERED

## 2018-03-29 PROCEDURE — 66984 XCAPSL CTRC RMVL W/O ECP: CPT | Mod: RT,,, | Performed by: OPHTHALMOLOGY

## 2018-03-29 PROCEDURE — 25000003 PHARM REV CODE 250: Performed by: OPHTHALMOLOGY

## 2018-03-29 PROCEDURE — 36000706: Performed by: OPHTHALMOLOGY

## 2018-03-29 PROCEDURE — 36000707: Performed by: OPHTHALMOLOGY

## 2018-03-29 PROCEDURE — 37000009 HC ANESTHESIA EA ADD 15 MINS: Performed by: OPHTHALMOLOGY

## 2018-03-29 PROCEDURE — 25000003 PHARM REV CODE 250: Performed by: ANESTHESIOLOGY

## 2018-03-29 PROCEDURE — 63600175 PHARM REV CODE 636 W HCPCS: Performed by: OPHTHALMOLOGY

## 2018-03-29 PROCEDURE — 25000003 PHARM REV CODE 250

## 2018-03-29 PROCEDURE — V2632 POST CHMBR INTRAOCULAR LENS: HCPCS | Performed by: OPHTHALMOLOGY

## 2018-03-29 DEVICE — IMPLANTABLE DEVICE: Type: IMPLANTABLE DEVICE | Site: EYE | Status: FUNCTIONAL

## 2018-03-29 RX ORDER — SODIUM CHLORIDE 9 MG/ML
INJECTION, SOLUTION INTRAVENOUS CONTINUOUS
Status: DISCONTINUED | OUTPATIENT
Start: 2018-03-29 | End: 2018-03-29 | Stop reason: HOSPADM

## 2018-03-29 RX ORDER — EPINEPHRINE 1 MG/ML
INJECTION, SOLUTION INTRACARDIAC; INTRAMUSCULAR; INTRAVENOUS; SUBCUTANEOUS
Status: DISCONTINUED
Start: 2018-03-29 | End: 2018-03-29 | Stop reason: HOSPADM

## 2018-03-29 RX ORDER — MIDAZOLAM HYDROCHLORIDE 1 MG/ML
INJECTION, SOLUTION INTRAMUSCULAR; INTRAVENOUS
Status: DISCONTINUED | OUTPATIENT
Start: 2018-03-29 | End: 2018-03-29

## 2018-03-29 RX ORDER — KETOROLAC TROMETHAMINE 5 MG/ML
1 SOLUTION OPHTHALMIC ONCE
Status: COMPLETED | OUTPATIENT
Start: 2018-03-29 | End: 2018-03-29

## 2018-03-29 RX ORDER — PREDNISOLONE ACETATE 10 MG/ML
SUSPENSION/ DROPS OPHTHALMIC
Status: DISCONTINUED
Start: 2018-03-29 | End: 2018-03-29 | Stop reason: HOSPADM

## 2018-03-29 RX ORDER — MOXIFLOXACIN 5 MG/ML
1 SOLUTION/ DROPS OPHTHALMIC
Status: COMPLETED | OUTPATIENT
Start: 2018-03-29 | End: 2018-03-29

## 2018-03-29 RX ORDER — LIDOCAINE HYDROCHLORIDE 10 MG/ML
1 INJECTION, SOLUTION EPIDURAL; INFILTRATION; INTRACAUDAL; PERINEURAL ONCE
Status: COMPLETED | OUTPATIENT
Start: 2018-03-29 | End: 2018-03-29

## 2018-03-29 RX ORDER — HYDROMORPHONE HYDROCHLORIDE 1 MG/ML
0.2 INJECTION, SOLUTION INTRAMUSCULAR; INTRAVENOUS; SUBCUTANEOUS EVERY 5 MIN PRN
Status: DISCONTINUED | OUTPATIENT
Start: 2018-03-29 | End: 2018-03-29 | Stop reason: HOSPADM

## 2018-03-29 RX ORDER — MOXIFLOXACIN 5 MG/ML
SOLUTION/ DROPS OPHTHALMIC
Status: DISCONTINUED | OUTPATIENT
Start: 2018-03-29 | End: 2018-03-29 | Stop reason: HOSPADM

## 2018-03-29 RX ORDER — TROPICAMIDE 10 MG/ML
1 SOLUTION/ DROPS OPHTHALMIC
Status: DISCONTINUED | OUTPATIENT
Start: 2018-03-29 | End: 2018-03-29 | Stop reason: HOSPADM

## 2018-03-29 RX ORDER — ACETAMINOPHEN 325 MG/1
650 TABLET ORAL EVERY 4 HOURS PRN
Status: DISCONTINUED | OUTPATIENT
Start: 2018-03-29 | End: 2018-03-29 | Stop reason: HOSPADM

## 2018-03-29 RX ORDER — SODIUM CHLORIDE 0.9 % (FLUSH) 0.9 %
3 SYRINGE (ML) INJECTION
Status: DISCONTINUED | OUTPATIENT
Start: 2018-03-29 | End: 2018-03-29 | Stop reason: HOSPADM

## 2018-03-29 RX ORDER — PREDNISOLONE ACETATE 10 MG/ML
SUSPENSION/ DROPS OPHTHALMIC
Status: DISCONTINUED | OUTPATIENT
Start: 2018-03-29 | End: 2018-03-29 | Stop reason: HOSPADM

## 2018-03-29 RX ORDER — PHENYLEPHRINE HYDROCHLORIDE 25 MG/ML
1 SOLUTION/ DROPS OPHTHALMIC
Status: DISCONTINUED | OUTPATIENT
Start: 2018-03-29 | End: 2018-03-29 | Stop reason: HOSPADM

## 2018-03-29 RX ORDER — METHOCARBAMOL 500 MG/1
500 TABLET, FILM COATED ORAL 4 TIMES DAILY
COMMUNITY
End: 2019-03-19 | Stop reason: ALTCHOICE

## 2018-03-29 RX ORDER — LIDOCAINE HYDROCHLORIDE 40 MG/ML
INJECTION, SOLUTION RETROBULBAR
Status: DISCONTINUED | OUTPATIENT
Start: 2018-03-29 | End: 2018-03-29 | Stop reason: HOSPADM

## 2018-03-29 RX ORDER — EPINEPHRINE 1 MG/ML
INJECTION, SOLUTION INTRACARDIAC; INTRAMUSCULAR; INTRAVENOUS; SUBCUTANEOUS
Status: DISCONTINUED | OUTPATIENT
Start: 2018-03-29 | End: 2018-03-29 | Stop reason: HOSPADM

## 2018-03-29 RX ORDER — LIDOCAINE HYDROCHLORIDE 10 MG/ML
INJECTION, SOLUTION EPIDURAL; INFILTRATION; INTRACAUDAL; PERINEURAL
Status: DISCONTINUED | OUTPATIENT
Start: 2018-03-29 | End: 2018-03-29 | Stop reason: HOSPADM

## 2018-03-29 RX ORDER — PROPARACAINE HYDROCHLORIDE 5 MG/ML
1 SOLUTION/ DROPS OPHTHALMIC
Status: DISCONTINUED | OUTPATIENT
Start: 2018-03-29 | End: 2018-03-29 | Stop reason: HOSPADM

## 2018-03-29 RX ORDER — LIDOCAINE HYDROCHLORIDE 40 MG/ML
INJECTION, SOLUTION RETROBULBAR
Status: DISCONTINUED
Start: 2018-03-29 | End: 2018-03-29 | Stop reason: HOSPADM

## 2018-03-29 RX ORDER — LIDOCAINE HYDROCHLORIDE 10 MG/ML
INJECTION, SOLUTION EPIDURAL; INFILTRATION; INTRACAUDAL; PERINEURAL
Status: COMPLETED
Start: 2018-03-29 | End: 2018-03-29

## 2018-03-29 RX ORDER — LIDOCAINE HYDROCHLORIDE 10 MG/ML
INJECTION, SOLUTION EPIDURAL; INFILTRATION; INTRACAUDAL; PERINEURAL
Status: DISCONTINUED
Start: 2018-03-29 | End: 2018-03-29 | Stop reason: HOSPADM

## 2018-03-29 RX ADMIN — KETOROLAC TROMETHAMINE 1 DROP: 5 SOLUTION OPHTHALMIC at 06:03

## 2018-03-29 RX ADMIN — TROPICAMIDE 1 DROP: 10 SOLUTION/ DROPS OPHTHALMIC at 06:03

## 2018-03-29 RX ADMIN — PHENYLEPHRINE HYDROCHLORIDE 1 DROP: 25 SOLUTION/ DROPS OPHTHALMIC at 06:03

## 2018-03-29 RX ADMIN — LIDOCAINE HYDROCHLORIDE 2 MG: 10 INJECTION, SOLUTION EPIDURAL; INFILTRATION; INTRACAUDAL; PERINEURAL at 06:03

## 2018-03-29 RX ADMIN — SODIUM CHLORIDE: 0.9 INJECTION, SOLUTION INTRAVENOUS at 06:03

## 2018-03-29 RX ADMIN — MOXIFLOXACIN HYDROCHLORIDE 1 DROP: 5 SOLUTION/ DROPS OPHTHALMIC at 06:03

## 2018-03-29 RX ADMIN — MIDAZOLAM HYDROCHLORIDE 1 MG: 1 INJECTION, SOLUTION INTRAMUSCULAR; INTRAVENOUS at 07:03

## 2018-03-29 RX ADMIN — PROPARACAINE HYDROCHLORIDE 1 DROP: 5 SOLUTION/ DROPS OPHTHALMIC at 06:03

## 2018-03-29 NOTE — DISCHARGE SUMMARY
BRIEF DISCHARGE NOTE:    Reason for hospitalization -  Cataract surgery     Final Diagnosis - Visually significant Cataract    Procedures and treatment provided - Status post phacoemulsification with placement of intraocular lens     Diet - Advance to regular as tolerated    Activity - as tolerated    Disposition at the end of the case - Good.    Discharge: to home    The patient tolerated the procedure well and knows to follow up with me tomorrow morning in the eye clinic, sooner if needed.    Patient and family instructions (as appropriate) - Given to patient on discharge    Veronica Ferrari MD

## 2018-03-29 NOTE — TRANSFER OF CARE
"Anesthesia Transfer of Care Note    Patient: Rubia Mathews    Procedure(s) Performed: Procedure(s) (LRB):  PHACOEMULSIFICATION-ASPIRATION-CATARACT (Right)  INSERTION-INTRAOCULAR LENS (IOL) (Right)    Patient location: PACU    Anesthesia Type: MAC    Transport from OR: Transported from OR on room air with adequate spontaneous ventilation    Post pain: adequate analgesia    Post assessment: no apparent anesthetic complications and tolerated procedure well    Post vital signs: stable    Level of consciousness: awake, alert and oriented    Nausea/Vomiting: no nausea/vomiting    Complications: none    Transfer of care protocol was followed      Last vitals:   Visit Vitals  /66 (BP Location: Left arm, Patient Position: Lying)   Pulse (!) 57   Temp 36.3 °C (97.3 °F) (Temporal)   Resp 18   Ht 5' 1" (1.549 m)   Wt 94.8 kg (209 lb)   SpO2 99%   Breastfeeding? No   BMI 39.49 kg/m²     "

## 2018-03-29 NOTE — ANESTHESIA POSTPROCEDURE EVALUATION
"Anesthesia Post Evaluation    Patient: Rubia Mathews    Procedure(s) Performed: Procedure(s) (LRB):  PHACOEMULSIFICATION-ASPIRATION-CATARACT (Right)  INSERTION-INTRAOCULAR LENS (IOL) (Right)    Final Anesthesia Type: MAC  Patient location during evaluation: PACU  Patient participation: Yes- Able to Participate  Level of consciousness: awake and alert  Post-procedure vital signs: reviewed and stable  Pain management: adequate  Airway patency: patent  PONV status at discharge: No PONV  Anesthetic complications: no      Cardiovascular status: blood pressure returned to baseline and stable  Respiratory status: unassisted  Hydration status: euvolemic  Follow-up not needed.        Visit Vitals  BP (!) 141/65   Pulse 70   Temp 36.2 °C (97.1 °F) (Temporal)   Resp 18   Ht 5' 1" (1.549 m)   Wt 94.8 kg (209 lb)   SpO2 99%   Breastfeeding? No   BMI 39.49 kg/m²       Pain/Sohail Score: Pain Assessment Performed: Yes (3/29/2018  8:07 AM)  Presence of Pain: denies (3/29/2018  8:07 AM)  Sohail Score: 10 (3/29/2018  7:37 AM)      "

## 2018-03-29 NOTE — OP NOTE
DATE OF PROCEDURE: 03/29/2018    SURGEON: CONSUELO GALVAN MD    PREOPERATIVE DIAGNOSIS:  Senile nuclear sclerotic cataract right eye.     POSTOPERATIVE DIAGNOSIS: Senile nuclear sclerotic cataract right eye.     PROCEDURE PERFORMED:  Phacoemulsification with placement of intraocular lens, right eye.    IMPLANT:  PCBOO 12.5    ANESTHESIA:  Topical and MAC    COMPLICATIONS: none    ESTIMATED BLOOD LOSS: <1cc    SPECIMENS: none    INDICATIONS FOR PROCEDURE:  This patient presented to the clinic with decreased vision in the right eye and was found to have a cataract.  The risks, benefits, and alternatives were discussed and the patient agreed to proceed with phacoemulsification and implantation of a lens in the right eye.     PROCEDURE IN DETAIL:  The patient was met in the preop holding area.  Consent was confirmed to be signed.  The operative site was marked.  The patient was brought into the operating room by the anesthesia team and placed under monitored anesthesia care.  The right eye was prepped and draped in a sterile ophthalmic fashion.  A Nelson speculum was placed into the right eye.   A paracentesis site was made and 1% preservative-free lidocaine was injected into the anterior chamber.  Viscoelastic  material was injected into the anterior chamber.  A keratome blade was used to make a clear corneal incision.  A cystotome was used to initiate the continuous curvilinear capsulorrhexis which was completed with Utrata forceps.  BSS on a taveras cannula was used to perform hydrodissection.  The phacoemulsification tip was introduced into the eye and the nucleus was removed in a standard divide-and-conquer fashion.  Remaining cortical material was removed from the eye using irrigation-aspiration.  The capsular bag was filled with viscoelastic material and the intraocular lens was injected and positioned into place. Remaining viscoelastic material was removed from the eye using irrigation and aspiration.  The  corneal wounds were hydrated.  The eye was filled to physiologic pressure. The wounds were found to be watertight. Drops of Vigamox and prednisilone were placed into the eye.  The eye was washed, dried, and shielded.  The patient tolerated the procedure well and knows to follow up with me tomorrow morning, sooner if needed.

## 2018-03-29 NOTE — ANESTHESIA PREPROCEDURE EVALUATION
03/29/2018  Rubia Mathews is a 62 y.o., female.    Anesthesia Evaluation    I have reviewed the Patient Summary Reports.        Review of Systems  Anesthesia Hx:  No problems with previous Anesthesia    Social:  Non-Smoker    Hematology/Oncology:  Hematology Normal   Oncology Normal     EENT/Dental:EENT/Dental Normal   Cardiovascular:   Hypertension Valvular problems/Murmurs, MVP    Pulmonary:  Pulmonary Normal    Renal/:  Renal/ Normal     Hepatic/GI:  Hepatic/GI Normal    Musculoskeletal:  Musculoskeletal Normal    Neurological:  Neurology Normal    Endocrine:  Endocrine Normal    Dermatological:  Skin Normal    Psych:  Psychiatric Normal           Physical Exam  General:  Well nourished    Airway/Jaw/Neck:  Airway Findings: Mouth Opening: Normal Tongue: Normal  General Airway Assessment: Adult  Mallampati: II  Improves to II with phonation.  TM Distance: Normal, at least 6 cm  Jaw/Neck Findings:  Neck ROM: Normal ROM      Dental:  Dental Findings: In tact   Chest/Lungs:  Chest/Lungs Findings: Clear to auscultation, Normal Respiratory Rate     Heart/Vascular:  Heart Findings: Rate: Normal  Rhythm: Regular Rhythm  Sounds: Normal             Anesthesia Plan  Type of Anesthesia, risks & benefits discussed:  Anesthesia Type:  general  Patient's Preference: General  Intra-op Monitoring Plan:   Intra-op Monitoring Plan Comments:   Post Op Pain Control Plan:   Post Op Pain Control Plan Comments:   Induction:   IV  Beta Blocker:  Patient is on a Beta-Blocker and has received one dose within the past 24 hours (No further documentation required).       Informed Consent: Patient understands risks and agrees with Anesthesia plan.  Questions answered. Anesthesia consent signed with patient.  ASA Score: 3     Day of Surgery Review of History & Physical: I have interviewed and examined the patient. I have  reviewed the patient's H&P dated:  There are no significant changes.          Ready For Surgery From Anesthesia Perspective.

## 2018-04-13 ENCOUNTER — OFFICE VISIT (OUTPATIENT)
Dept: OPHTHALMOLOGY | Facility: CLINIC | Age: 63
End: 2018-04-13
Payer: COMMERCIAL

## 2018-04-13 DIAGNOSIS — Z96.1 STATUS POST CATARACT EXTRACTION AND INSERTION OF INTRAOCULAR LENS, RIGHT: Primary | ICD-10-CM

## 2018-04-13 DIAGNOSIS — H25.12 NUCLEAR SCLEROTIC CATARACT OF LEFT EYE: ICD-10-CM

## 2018-04-13 DIAGNOSIS — Z98.41 STATUS POST CATARACT EXTRACTION AND INSERTION OF INTRAOCULAR LENS, RIGHT: Primary | ICD-10-CM

## 2018-04-13 PROCEDURE — 99024 POSTOP FOLLOW-UP VISIT: CPT | Mod: S$GLB,,, | Performed by: OPHTHALMOLOGY

## 2018-04-13 PROCEDURE — 92136 OPHTHALMIC BIOMETRY: CPT | Mod: LT,S$GLB,, | Performed by: OPHTHALMOLOGY

## 2018-04-13 PROCEDURE — 99999 PR PBB SHADOW E&M-EST. PATIENT-LVL II: CPT | Mod: PBBFAC,,, | Performed by: OPHTHALMOLOGY

## 2018-04-13 NOTE — PROGRESS NOTES
HPI     Dr. Jess Quintana, OD at [facility]  Myopia , +1.25 readers    S/P Phaco w/IOL OD 03/29/2018    Eye meds:  PF BID OD  Ketoralac BID OD       Last edited by Mila Tariq on 4/13/2018  3:38 PM. (History)            Assessment /Plan     For exam results, see Encounter Report.    Status post cataract extraction and insertion of intraocular lens, right    Nuclear sclerotic cataract of left eye  -     IOL Master - OU - Both Eyes      Visually significant nuclear sclerotic cataract   - Interfering with activities of daily living.  Pt desires cataract surgery for [facility] rehabilitation.   - R/B/A discussed and pt agrees to proceed with surgery.   - IOL options discussed according to patient's goals and concomitant ocular pathology; and pt content with monofocal lens.    - Target: plano.    (pcboo 12.0 OS)      S/P Phaco w/IOL OD 03/29/2018  - slight myopic result despite W/A IOL calcuation  - PF TID x 2 more weeks then stop    Myopia

## 2018-04-18 RX ORDER — TRIAMTERENE/HYDROCHLOROTHIAZID 37.5-25 MG
TABLET ORAL
Qty: 90 TABLET | Refills: 1 | Status: SHIPPED | OUTPATIENT
Start: 2018-04-18 | End: 2018-07-05 | Stop reason: SDUPTHER

## 2018-04-19 ENCOUNTER — TELEPHONE (OUTPATIENT)
Dept: OPHTHALMOLOGY | Facility: CLINIC | Age: 63
End: 2018-04-19

## 2018-04-19 DIAGNOSIS — H25.12 NUCLEAR SCLEROTIC CATARACT OF LEFT EYE: Primary | ICD-10-CM

## 2018-05-07 ENCOUNTER — ANESTHESIA EVENT (OUTPATIENT)
Dept: SURGERY | Facility: HOSPITAL | Age: 63
End: 2018-05-07
Payer: COMMERCIAL

## 2018-05-15 ENCOUNTER — TELEPHONE (OUTPATIENT)
Dept: OPHTHALMOLOGY | Facility: CLINIC | Age: 63
End: 2018-05-15

## 2018-05-16 ENCOUNTER — TELEPHONE (OUTPATIENT)
Dept: OPTOMETRY | Facility: CLINIC | Age: 63
End: 2018-05-16

## 2018-05-16 NOTE — H&P
History    Chief complaint:  Painless progressive vision loss    Present Ilness/Diagnosis: Nuclear sclerotic Cataract    Past Medical History:  has a past medical history of Adjustment disorder with mixed anxiety and depressed mood; Diverticulitis of colon (without mention of hemorrhage)(562.11); Diverticulosis of colon (without mention of hemorrhage); HLD (hyperlipidemia); HTN (hypertension); Lumbago; Mitral valve disorder; Mitral valve disorders(424.0); Pain in joint, shoulder region; and Sarcoidosis.    Family History/Social History: refer to chart    Allergies:   Review of patient's allergies indicates:   Allergen Reactions    Losartan Hives, Swelling and Rash    Ciprofloxacin      visual disturbance    Lisinopril      coughing    Zithromax [azithromycin] Diarrhea       Current Medications: No current facility-administered medications for this encounter.     Current Outpatient Prescriptions:     amLODIPine (NORVASC) 10 MG tablet, TAKE 1 TABLET (10 MG TOTAL) BY MOUTH ONCE DAILY. (Patient taking differently: TAKE 1 TABLET (10 MG TOTAL) BY MOUTH ONCE NIGHTLY), Disp: 90 tablet, Rfl: 1    carvedilol (COREG) 6.25 MG tablet, TAKE 1 TABLET (6.25 MG TOTAL) BY MOUTH 2 (TWO) TIMES DAILY., Disp: 180 tablet, Rfl: 1    hydrocodone-acetaminophen 5-325mg (NORCO) 5-325 mg per tablet, Take 1 tablet by mouth every 8 (eight) hours as needed for Pain., Disp: 10 tablet, Rfl: 0    methocarbamol (ROBAXIN) 500 MG Tab, Take 500 mg by mouth 4 (four) times daily., Disp: , Rfl:     triamterene-hydrochlorothiazide 37.5-25 mg (MAXZIDE-25) 37.5-25 mg per tablet, TAKE 1 TABLET BY MOUTH ONCE DAILY, Disp: 90 tablet, Rfl: 1    Physical Exam    BP: Vital signs stable  General: No apparent distress  HEENT: nuclear sclerotic cataract  Lungs: adequate respirations  Heart: + pulses  Abdomen: soft  Rectal/pelvic: deferred    Impression: Visually significant Cataract    Plan: Phacoemulsification with implantation of Intraocular lens

## 2018-05-17 ENCOUNTER — SURGERY (OUTPATIENT)
Age: 63
End: 2018-05-17

## 2018-05-17 ENCOUNTER — HOSPITAL ENCOUNTER (OUTPATIENT)
Facility: HOSPITAL | Age: 63
Discharge: HOME | End: 2018-05-17
Attending: OPHTHALMOLOGY | Admitting: OPHTHALMOLOGY
Payer: COMMERCIAL

## 2018-05-17 ENCOUNTER — ANESTHESIA (OUTPATIENT)
Dept: SURGERY | Facility: HOSPITAL | Age: 63
End: 2018-05-17
Payer: COMMERCIAL

## 2018-05-17 VITALS
DIASTOLIC BLOOD PRESSURE: 64 MMHG | TEMPERATURE: 98 F | BODY MASS INDEX: 38.89 KG/M2 | OXYGEN SATURATION: 97 % | HEART RATE: 57 BPM | RESPIRATION RATE: 18 BRPM | HEIGHT: 61 IN | WEIGHT: 206 LBS | SYSTOLIC BLOOD PRESSURE: 131 MMHG

## 2018-05-17 DIAGNOSIS — H25.12 NUCLEAR SENILE CATARACT OF LEFT EYE: Primary | ICD-10-CM

## 2018-05-17 DIAGNOSIS — H25.10 SENILE NUCLEAR SCLEROSIS: ICD-10-CM

## 2018-05-17 PROCEDURE — 36000706: Performed by: OPHTHALMOLOGY

## 2018-05-17 PROCEDURE — 66984 XCAPSL CTRC RMVL W/O ECP: CPT | Mod: 79,LT,, | Performed by: OPHTHALMOLOGY

## 2018-05-17 PROCEDURE — D9220A PRA ANESTHESIA: Mod: CRNA,,, | Performed by: NURSE ANESTHETIST, CERTIFIED REGISTERED

## 2018-05-17 PROCEDURE — 37000008 HC ANESTHESIA 1ST 15 MINUTES: Performed by: OPHTHALMOLOGY

## 2018-05-17 PROCEDURE — 25000003 PHARM REV CODE 250: Performed by: OPHTHALMOLOGY

## 2018-05-17 PROCEDURE — 63600175 PHARM REV CODE 636 W HCPCS: Performed by: OPHTHALMOLOGY

## 2018-05-17 PROCEDURE — 37000009 HC ANESTHESIA EA ADD 15 MINS: Performed by: OPHTHALMOLOGY

## 2018-05-17 PROCEDURE — V2632 POST CHMBR INTRAOCULAR LENS: HCPCS | Performed by: OPHTHALMOLOGY

## 2018-05-17 PROCEDURE — 63600175 PHARM REV CODE 636 W HCPCS: Performed by: NURSE ANESTHETIST, CERTIFIED REGISTERED

## 2018-05-17 PROCEDURE — 36000707: Performed by: OPHTHALMOLOGY

## 2018-05-17 PROCEDURE — D9220A PRA ANESTHESIA: Mod: ANES,,, | Performed by: ANESTHESIOLOGY

## 2018-05-17 PROCEDURE — 71000015 HC POSTOP RECOV 1ST HR: Performed by: OPHTHALMOLOGY

## 2018-05-17 DEVICE — LENS IOL ITEC PRELOAD 12.0D: Type: IMPLANTABLE DEVICE | Site: EYE | Status: FUNCTIONAL

## 2018-05-17 RX ORDER — HYDROMORPHONE HYDROCHLORIDE 1 MG/ML
0.2 INJECTION, SOLUTION INTRAMUSCULAR; INTRAVENOUS; SUBCUTANEOUS EVERY 5 MIN PRN
Status: CANCELLED | OUTPATIENT
Start: 2018-05-17

## 2018-05-17 RX ORDER — PHENYLEPHRINE HYDROCHLORIDE 25 MG/ML
1 SOLUTION/ DROPS OPHTHALMIC
Status: DISCONTINUED | OUTPATIENT
Start: 2018-05-17 | End: 2018-05-17 | Stop reason: HOSPADM

## 2018-05-17 RX ORDER — SODIUM CHLORIDE 9 MG/ML
INJECTION, SOLUTION INTRAVENOUS CONTINUOUS
Status: DISCONTINUED | OUTPATIENT
Start: 2018-05-17 | End: 2018-05-17 | Stop reason: HOSPADM

## 2018-05-17 RX ORDER — LIDOCAINE HYDROCHLORIDE 10 MG/ML
1 INJECTION, SOLUTION EPIDURAL; INFILTRATION; INTRACAUDAL; PERINEURAL ONCE
Status: DISCONTINUED | OUTPATIENT
Start: 2018-05-17 | End: 2018-05-17 | Stop reason: HOSPADM

## 2018-05-17 RX ORDER — DIPHENHYDRAMINE HYDROCHLORIDE 50 MG/ML
25 INJECTION INTRAMUSCULAR; INTRAVENOUS EVERY 6 HOURS PRN
Status: DISCONTINUED | OUTPATIENT
Start: 2018-05-17 | End: 2018-05-17 | Stop reason: HOSPADM

## 2018-05-17 RX ORDER — ACETAMINOPHEN 325 MG/1
650 TABLET ORAL EVERY 4 HOURS PRN
Status: DISCONTINUED | OUTPATIENT
Start: 2018-05-17 | End: 2018-05-17 | Stop reason: HOSPADM

## 2018-05-17 RX ORDER — ONDANSETRON 2 MG/ML
4 INJECTION INTRAMUSCULAR; INTRAVENOUS ONCE AS NEEDED
Status: DISCONTINUED | OUTPATIENT
Start: 2018-05-17 | End: 2018-05-17 | Stop reason: HOSPADM

## 2018-05-17 RX ORDER — PROPARACAINE HYDROCHLORIDE 5 MG/ML
1 SOLUTION/ DROPS OPHTHALMIC
Status: DISCONTINUED | OUTPATIENT
Start: 2018-05-17 | End: 2018-05-17 | Stop reason: HOSPADM

## 2018-05-17 RX ORDER — EPINEPHRINE 1 MG/ML
INJECTION, SOLUTION INTRACARDIAC; INTRAMUSCULAR; INTRAVENOUS; SUBCUTANEOUS
Status: DISCONTINUED | OUTPATIENT
Start: 2018-05-17 | End: 2018-05-17 | Stop reason: HOSPADM

## 2018-05-17 RX ORDER — LIDOCAINE HYDROCHLORIDE 40 MG/ML
INJECTION, SOLUTION RETROBULBAR
Status: DISCONTINUED | OUTPATIENT
Start: 2018-05-17 | End: 2018-05-17 | Stop reason: HOSPADM

## 2018-05-17 RX ORDER — EPINEPHRINE 1 MG/ML
INJECTION, SOLUTION INTRACARDIAC; INTRAMUSCULAR; INTRAVENOUS; SUBCUTANEOUS
Status: DISCONTINUED
Start: 2018-05-17 | End: 2018-05-17 | Stop reason: HOSPADM

## 2018-05-17 RX ORDER — MIDAZOLAM HYDROCHLORIDE 1 MG/ML
INJECTION, SOLUTION INTRAMUSCULAR; INTRAVENOUS
Status: DISCONTINUED | OUTPATIENT
Start: 2018-05-17 | End: 2018-05-17

## 2018-05-17 RX ORDER — MOXIFLOXACIN 5 MG/ML
1 SOLUTION/ DROPS OPHTHALMIC
Status: COMPLETED | OUTPATIENT
Start: 2018-05-17 | End: 2018-05-17

## 2018-05-17 RX ORDER — FENTANYL CITRATE 50 UG/ML
25 INJECTION, SOLUTION INTRAMUSCULAR; INTRAVENOUS EVERY 5 MIN PRN
Status: CANCELLED | OUTPATIENT
Start: 2018-05-17

## 2018-05-17 RX ORDER — DIPHENHYDRAMINE HYDROCHLORIDE 50 MG/ML
25 INJECTION INTRAMUSCULAR; INTRAVENOUS EVERY 6 HOURS PRN
Status: CANCELLED | OUTPATIENT
Start: 2018-05-17

## 2018-05-17 RX ORDER — MOXIFLOXACIN 5 MG/ML
SOLUTION/ DROPS OPHTHALMIC
Status: DISCONTINUED | OUTPATIENT
Start: 2018-05-17 | End: 2018-05-17 | Stop reason: HOSPADM

## 2018-05-17 RX ORDER — LIDOCAINE HYDROCHLORIDE 40 MG/ML
INJECTION, SOLUTION RETROBULBAR
Status: DISCONTINUED
Start: 2018-05-17 | End: 2018-05-17 | Stop reason: HOSPADM

## 2018-05-17 RX ORDER — LIDOCAINE HYDROCHLORIDE 10 MG/ML
INJECTION, SOLUTION EPIDURAL; INFILTRATION; INTRACAUDAL; PERINEURAL
Status: DISCONTINUED | OUTPATIENT
Start: 2018-05-17 | End: 2018-05-17 | Stop reason: HOSPADM

## 2018-05-17 RX ORDER — TROPICAMIDE 10 MG/ML
1 SOLUTION/ DROPS OPHTHALMIC
Status: DISCONTINUED | OUTPATIENT
Start: 2018-05-17 | End: 2018-05-17 | Stop reason: HOSPADM

## 2018-05-17 RX ORDER — PREDNISOLONE ACETATE 10 MG/ML
SUSPENSION/ DROPS OPHTHALMIC
Status: DISCONTINUED
Start: 2018-05-17 | End: 2018-05-17 | Stop reason: HOSPADM

## 2018-05-17 RX ORDER — MEPERIDINE HYDROCHLORIDE 25 MG/ML
12.5 INJECTION INTRAMUSCULAR; INTRAVENOUS; SUBCUTANEOUS ONCE AS NEEDED
Status: DISCONTINUED | OUTPATIENT
Start: 2018-05-17 | End: 2018-05-17 | Stop reason: HOSPADM

## 2018-05-17 RX ORDER — FENTANYL CITRATE 50 UG/ML
25 INJECTION, SOLUTION INTRAMUSCULAR; INTRAVENOUS EVERY 5 MIN PRN
Status: DISCONTINUED | OUTPATIENT
Start: 2018-05-17 | End: 2018-05-17 | Stop reason: HOSPADM

## 2018-05-17 RX ORDER — LIDOCAINE HYDROCHLORIDE 10 MG/ML
INJECTION, SOLUTION EPIDURAL; INFILTRATION; INTRACAUDAL; PERINEURAL
Status: DISCONTINUED
Start: 2018-05-17 | End: 2018-05-17 | Stop reason: HOSPADM

## 2018-05-17 RX ORDER — ONDANSETRON 2 MG/ML
4 INJECTION INTRAMUSCULAR; INTRAVENOUS ONCE AS NEEDED
Status: CANCELLED | OUTPATIENT
Start: 2018-05-17 | End: 2018-05-17

## 2018-05-17 RX ORDER — KETOROLAC TROMETHAMINE 5 MG/ML
1 SOLUTION OPHTHALMIC ONCE
Status: COMPLETED | OUTPATIENT
Start: 2018-05-17 | End: 2018-05-17

## 2018-05-17 RX ORDER — MEPERIDINE HYDROCHLORIDE 25 MG/ML
12.5 INJECTION INTRAMUSCULAR; INTRAVENOUS; SUBCUTANEOUS ONCE AS NEEDED
Status: CANCELLED | OUTPATIENT
Start: 2018-05-17 | End: 2018-05-17

## 2018-05-17 RX ORDER — HYDROMORPHONE HYDROCHLORIDE 1 MG/ML
0.2 INJECTION, SOLUTION INTRAMUSCULAR; INTRAVENOUS; SUBCUTANEOUS EVERY 5 MIN PRN
Status: DISCONTINUED | OUTPATIENT
Start: 2018-05-17 | End: 2018-05-17 | Stop reason: HOSPADM

## 2018-05-17 RX ORDER — PREDNISOLONE ACETATE 10 MG/ML
SUSPENSION/ DROPS OPHTHALMIC
Status: DISCONTINUED | OUTPATIENT
Start: 2018-05-17 | End: 2018-05-17 | Stop reason: HOSPADM

## 2018-05-17 RX ADMIN — LIDOCAINE HYDROCHLORIDE 5 ML: 40 INJECTION, SOLUTION RETROBULBAR; TOPICAL at 09:05

## 2018-05-17 RX ADMIN — MIDAZOLAM HYDROCHLORIDE 2 MG: 1 INJECTION, SOLUTION INTRAMUSCULAR; INTRAVENOUS at 09:05

## 2018-05-17 RX ADMIN — LIDOCAINE HYDROCHLORIDE 1 ML: 10 INJECTION, SOLUTION EPIDURAL; INFILTRATION; INTRACAUDAL; PERINEURAL at 09:05

## 2018-05-17 RX ADMIN — PHENYLEPHRINE HYDROCHLORIDE 1 DROP: 25 SOLUTION/ DROPS OPHTHALMIC at 08:05

## 2018-05-17 RX ADMIN — EPINEPHRINE 0.3 MG: 1 INJECTION, SOLUTION INTRAMUSCULAR; SUBCUTANEOUS at 09:05

## 2018-05-17 RX ADMIN — TROPICAMIDE 1 DROP: 10 SOLUTION/ DROPS OPHTHALMIC at 08:05

## 2018-05-17 RX ADMIN — MOXIFLOXACIN HYDROCHLORIDE 1 DROP: 5 SOLUTION/ DROPS OPHTHALMIC at 08:05

## 2018-05-17 RX ADMIN — SODIUM CHONDROITIN SULFATE / SODIUM HYALURONATE 1.05 ML: 0.55-0.5 INJECTION INTRAOCULAR at 09:05

## 2018-05-17 RX ADMIN — KETOROLAC TROMETHAMINE 1 DROP: 5 SOLUTION OPHTHALMIC at 08:05

## 2018-05-17 RX ADMIN — PROPARACAINE HYDROCHLORIDE 1 DROP: 5 SOLUTION/ DROPS OPHTHALMIC at 08:05

## 2018-05-17 RX ADMIN — MOXIFLOXACIN HYDROCHLORIDE 1 DROP: 5 SOLUTION/ DROPS OPHTHALMIC at 09:05

## 2018-05-17 RX ADMIN — SODIUM CHLORIDE: 0.9 INJECTION, SOLUTION INTRAVENOUS at 08:05

## 2018-05-17 RX ADMIN — PREDNISOLONE ACETATE 2 DROP: 10 SUSPENSION/ DROPS OPHTHALMIC at 09:05

## 2018-05-17 NOTE — DISCHARGE INSTRUCTIONS
Discharge Instructions for Cataract Surgery  A surgeon removed the cloudy lens in your eye and replaced it with a clear man-made lens. Be sure to have an adult family member or friend drive you home after surgery. Heres what you can expect following surgery and tips for a healthy recovery.  It is normal to have the following:  · Bruised or bloodshot eye for 7 days  · Itching and mild discomfort for several days  · Some fluid discharge  · Sensitivity to light  · Scratchy, sandlike feeling in the eye for 2 weeks  · Feeling tired, especially during the first 24 hours  Activity level  · Do not drive for 2 days or as instructed by your doctor.  · Do not drink alcohol for at least 24 hours.  · Avoid bending at the waist to  objects or lifting anything heavy for 2 days.  · Relax for the first 24 hours after surgery. Watching TV and reading are OK and wont harm your eye.  Eye protection  · Do not rub or press on your eye.  · Sleep on your back or on your unoperated side for 2 nights.  · If instructed, wear a bandage over your eye for 2 days and 2 nights.  · If instructed, wear a shield to protect your eye for 2 days and 2 nights.  Using eyedrops  You may be given special eyedrops or ointment. Here is one way to use eyedrops:  · Tilt your head back.  · Pull your bottom eyelid down.  · Squeeze one drop into your eye. Do not touch your eye with the bottle tip.  · Close your eyes for a few seconds.  · If you need more than one drop, wait a few minutes before adding the next one.   Call your doctor right away if you have any of the following:  · Bleeding or discharge from the eye  · Your vision suddenly becomes worse  · Pain medicine you are told to take does not ease your pain  · Nausea or vomiting  · Chills or fever over 100.4°F (39.1°C)   Date Last Reviewed: 5/30/2015  © 8478-4989 [employer]. [address] [place] [address], [place] [address]. All rights reserved. This information is not intended as a  substitute for professional medical care. Always follow your healthcare professional's instructions.

## 2018-05-17 NOTE — OP NOTE
DATE OF PROCEDURE: 05/17/2018    SURGEON: CONSUELO GALVAN MD    PREOPERATIVE DIAGNOSIS:  Senile nuclear sclerotic cataract left eye.     POSTOPERATIVE DIAGNOSIS: Senile nuclear sclerotic cataract left eye.     PROCEDURE PERFORMED:  Phacoemulsification with placement of intraocular lens, left eye.    IMPLANT:  PCBOO 12.0    ANESTHESIA:  Topical and MAC    COMPLICATIONS: none    ESTIMATED BLOOD LOSS: <1cc    SPECIMENS: none    INDICATIONS FOR PROCEDURE:  This patient presented to the clinic with decreased vision in the left eye and was found to have a cataract.  The risks, benefits, and alternatives were discussed and the patient agreed to proceed with phacoemulsification and implantation of a lens in the left eye.     PROCEDURE IN DETAIL:  The patient was met in the preop holding area.  Consent was confirmed to be signed.  The operative site was marked.  The patient was brought into the operating room by the anesthesia team and placed under monitored anesthesia care.  The left eye was prepped and draped in a sterile ophthalmic fashion.  A Nelson speculum was placed into the left eye.   A paracentesis site was made and 1% preservative-free lidocaine was injected into the anterior chamber.  Viscoelastic  material was injected into the anterior chamber.  A keratome blade was used to make a clear corneal incision.  A cystotome was used to initiate the continuous curvilinear capsulorrhexis which was completed with Utrata forceps.  BSS on a taveras cannula was used to perform hydrodissection.  The phacoemulsification tip was introduced into the eye and the nucleus was removed in a standard divide-and-conquer fashion.  Remaining cortical material was removed from the eye using irrigation-aspiration.  The capsular bag was filled with viscoelastic material and the intraocular lens was injected and positioned into place. Remaining viscoelastic material was removed from the eye using irrigation and aspiration.  The corneal  wounds were hydrated.  The eye was filled to physiologic pressure. The wounds were found to be watertight. Drops of Vigamox and prednisilone were placed into the eye.  The eye was washed, dried, and shielded.  The patient tolerated the procedure well and knows to follow up with me tomorrow morning, sooner if needed.

## 2018-05-17 NOTE — TRANSFER OF CARE
"Anesthesia Transfer of Care Note    Patient: Rubia Mathews    Procedure(s) Performed: Procedure(s) (LRB):  PHACOEMULSIFICATION-ASPIRATION-CATARACT (Left)  INSERTION-INTRAOCULAR LENS (IOL) (Left)    Patient location: PACU    Anesthesia Type: MAC    Transport from OR: Transported from OR on room air with adequate spontaneous ventilation    Post pain: adequate analgesia    Post assessment: no apparent anesthetic complications    Post vital signs: stable    Level of consciousness: awake, alert and oriented    Nausea/Vomiting: no nausea/vomiting    Complications: none    Transfer of care protocol was followed      Last vitals:   Visit Vitals  /69 (BP Location: Left arm, Patient Position: Lying)   Pulse 79   Temp 36.5 °C (97.7 °F) (Temporal)   Resp 17   Ht 5' 1" (1.549 m)   Wt 93.4 kg (206 lb)   SpO2 98%   Breastfeeding? No   BMI 38.92 kg/m²     "

## 2018-05-17 NOTE — INTERVAL H&P NOTE
Patient seen and examined today, H&P reviewed.  There are no changes to the patient's H&P.  There is still an ongoing indication for the procedure.  All questions answered.

## 2018-05-17 NOTE — PLAN OF CARE
Discharge instructions given, Pt verbalizes understanding. Consents in chart. Vital Signs stable. No complaints. No questions or concerns at this time.    Respirations even and unlabored. No distress noted.

## 2018-05-17 NOTE — ANESTHESIA POSTPROCEDURE EVALUATION
"Anesthesia Post Evaluation    Patient: Rubia Mathews    Procedure(s) Performed: Procedure(s) (LRB):  PHACOEMULSIFICATION-ASPIRATION-CATARACT (Left)  INSERTION-INTRAOCULAR LENS (IOL) (Left)    Final Anesthesia Type: general  Patient location during evaluation: GI PACU  Patient participation: Yes- Able to Participate  Level of consciousness: awake and alert  Post-procedure vital signs: reviewed and stable  Pain management: adequate  Airway patency: patent  PONV status at discharge: No PONV  Anesthetic complications: no      Cardiovascular status: blood pressure returned to baseline  Respiratory status: unassisted  Hydration status: euvolemic  Follow-up not needed.        Visit Vitals  /64 (BP Location: Left arm, Patient Position: Lying)   Pulse (!) 57   Temp 36.6 °C (97.9 °F) (Temporal)   Resp 18   Ht 5' 1" (1.549 m)   Wt 93.4 kg (206 lb)   SpO2 97%   Breastfeeding? No   BMI 38.92 kg/m²       Pain/Sohail Score: Pain Assessment Performed: Yes (5/17/2018 10:00 AM)  Presence of Pain: denies (5/17/2018 10:00 AM)  Sohail Score: 10 (5/17/2018 10:00 AM)      "

## 2018-05-17 NOTE — ANESTHESIA PREPROCEDURE EVALUATION
05/17/2018  Rubia Mathews is a 63 y.o., female.    Anesthesia Evaluation         Review of Systems  Anesthesia Hx:  No problems with previous Anesthesia   Social:  Non-Smoker    Cardiovascular:   Exercise tolerance: good Hypertension Denies CAD.     Denies Angina.  Functional Capacity Can you climb two flights of stairs? ==> Yes    Pulmonary:   Denies Asthma.  Denies Recent URI.  Denies Sleep Apnea.    Renal/:  Renal/ Normal     Hepatic/GI:   Denies PUD. Denies Hiatal Hernia.  Denies GERD. Denies Liver Disease.  Denies Hepatitis.    Neurological:   Denies CVA. Denies Seizures.    Endocrine:   Denies Diabetes. Denies Hypothyroidism.        Physical Exam  General:  Well nourished    Airway/Jaw/Neck:  Airway Findings: Mouth Opening: Normal Tongue: Normal  General Airway Assessment: Adult  Mallampati: I  TM Distance: Normal, at least 6 cm  Jaw/Neck Findings:  Neck ROM: Normal ROM  Neck Findings:     Eyes/Ears/Nose:  EYES/EARS/NOSE FINDINGS: Normal   Dental:  Dental Findings: In tact, Upper Dentures   Chest/Lungs:  Chest/Lungs Findings: Clear to auscultation     Heart/Vascular:  Heart Findings: Rate: Normal  Rhythm: Regular Rhythm  Sounds: Normal        Mental Status:  Mental Status Findings:  Alert and Oriented         Anesthesia Plan  Type of Anesthesia, risks & benefits discussed:  Anesthesia Type:  MAC  Patient's Preference: Proceed with anesthesia understanding that the risks are very small but could be serious or life threatening.  Intra-op Monitoring Plan: standard ASA monitors  Intra-op Monitoring Plan Comments:   Post Op Pain Control Plan:   Post Op Pain Control Plan Comments:   Induction:   IV  Beta Blocker:  Patient is on a Beta-Blocker and has received one dose within the past 24 hours (No further documentation required).       Informed Consent: Patient understands risks and agrees with  Anesthesia plan.  Questions answered. Anesthesia consent signed with patient.  ASA Score: 2     Day of Surgery Review of History & Physical: I have interviewed and examined the patient. I have reviewed the patient's H&P dated:            Ready For Surgery From Anesthesia Perspective.

## 2018-05-18 ENCOUNTER — OFFICE VISIT (OUTPATIENT)
Dept: OPHTHALMOLOGY | Facility: CLINIC | Age: 63
End: 2018-05-18
Payer: COMMERCIAL

## 2018-05-18 DIAGNOSIS — H52.13 MYOPIA OF BOTH EYES: ICD-10-CM

## 2018-05-18 DIAGNOSIS — Z96.1 STATUS POST CATARACT EXTRACTION AND INSERTION OF INTRAOCULAR LENS, RIGHT: Primary | ICD-10-CM

## 2018-05-18 DIAGNOSIS — Z96.1 STATUS POST CATARACT EXTRACTION AND INSERTION OF INTRAOCULAR LENS, LEFT: ICD-10-CM

## 2018-05-18 DIAGNOSIS — Z98.42 STATUS POST CATARACT EXTRACTION AND INSERTION OF INTRAOCULAR LENS, LEFT: ICD-10-CM

## 2018-05-18 DIAGNOSIS — Z98.41 STATUS POST CATARACT EXTRACTION AND INSERTION OF INTRAOCULAR LENS, RIGHT: Primary | ICD-10-CM

## 2018-05-18 PROCEDURE — 99999 PR PBB SHADOW E&M-EST. PATIENT-LVL II: CPT | Mod: PBBFAC,,, | Performed by: OPHTHALMOLOGY

## 2018-05-18 PROCEDURE — 99024 POSTOP FOLLOW-UP VISIT: CPT | Mod: S$GLB,,, | Performed by: OPHTHALMOLOGY

## 2018-05-18 NOTE — PROGRESS NOTES
HPI     Dr. Jess Quintana, OD at [facility]  Myopia , +1.25 readers    1 day s/p phaco w/IOL OS 5/17/18  S/P Phaco w/IOL OD 03/29/2018    Eye meds:  PF BID OS  Ketoralac BID OS  Ocuflox TID OS     Pt here for 1 day post op check OS. Pt states OS feels a little sore.  Pt   denies pain OS.  Pt states some haziness on the left side her vision OS.     Last edited by Vanesa Desai MA on 5/18/2018  9:09 AM.   (History)            Assessment /Plan     For exam results, see Encounter Report.    Status post cataract extraction and insertion of intraocular lens, right    Status post cataract extraction and insertion of intraocular lens, left    Myopia of both eyes      Slit Lamp Exam  L/L - normal  C/s - quiet  Cornea - clear  A/C - 1+ cell  Lens - PCIOL    POD #1 s/p phaco/IOL  - doing well  - continue the following drops:    vigamox or ocuflox TID x 1 wk then stop  Pred forte or durezol or dexamethasone TID x  4 wks  Ketorolac TID until runs out    Appropriate precautions and post op medications reviewed.  Patient instructed to call or come in if symptoms of redness, decreased vision, or pain are experienced.    -f/up 1-2wks, sooner PRN.       Then 1 mo with dr. quintana

## 2018-05-25 ENCOUNTER — TELEPHONE (OUTPATIENT)
Dept: OPHTHALMOLOGY | Facility: CLINIC | Age: 63
End: 2018-05-25

## 2018-06-05 ENCOUNTER — OFFICE VISIT (OUTPATIENT)
Dept: OPHTHALMOLOGY | Facility: CLINIC | Age: 63
End: 2018-06-05
Payer: COMMERCIAL

## 2018-06-05 DIAGNOSIS — Z96.1 STATUS POST CATARACT EXTRACTION AND INSERTION OF INTRAOCULAR LENS, LEFT: ICD-10-CM

## 2018-06-05 DIAGNOSIS — Z98.41 STATUS POST CATARACT EXTRACTION AND INSERTION OF INTRAOCULAR LENS, RIGHT: Primary | ICD-10-CM

## 2018-06-05 DIAGNOSIS — Z96.1 STATUS POST CATARACT EXTRACTION AND INSERTION OF INTRAOCULAR LENS, RIGHT: Primary | ICD-10-CM

## 2018-06-05 DIAGNOSIS — H52.13 MYOPIA OF BOTH EYES: ICD-10-CM

## 2018-06-05 DIAGNOSIS — Z98.42 STATUS POST CATARACT EXTRACTION AND INSERTION OF INTRAOCULAR LENS, LEFT: ICD-10-CM

## 2018-06-05 PROCEDURE — 99999 PR PBB SHADOW E&M-EST. PATIENT-LVL II: CPT | Mod: PBBFAC,,, | Performed by: OPHTHALMOLOGY

## 2018-06-05 PROCEDURE — 99024 POSTOP FOLLOW-UP VISIT: CPT | Mod: S$GLB,,, | Performed by: OPHTHALMOLOGY

## 2018-06-05 NOTE — LETTER
[facility] - Ophthalmology  [address]  [place] [address]  Phone: [phone]  Fax: [phone]   June 5, 2018    Jess Quintana, OD  [address] [facility] [address]    Patient: Rubia Mathews   MR Number: [id number]   YOB: 1955   Date of Visit: 6/5/2018       Dear Dr. Quintana:    Thank you for referring Rubia Mathews to me for evaluation. Here is my assessment and plan of care:      Status post cataract extraction and insertion of intraocular lens, right  Status post cataract extraction and insertion of intraocular lens, left  H/o Myopia of both eyes    S/p phaco w/IOL OS 5/17/18   S/P Phaco w/IOL OD 03/29/2018     Doing well, [facility], recommend OTC readers for near.  F/up Dr. Quintana for continued eye care, DFE.        Below you will find my full exam findings. If you have questions, please do not hesitate to call me. I look forward to following Ms. Rubia Mathews along with you.    Sincerely,        Veronica Ferrari MD       CC  No Recipients             Base Eye Exam     Visual Acuity (Snellen - Linear)       Right Left    Dist sc  20/25 +2          Tonometry (Tonopen, 8:49 AM)       Right Left    Pressure  18          Pupils       Pupils React APD    Right PERRL Slow None    Left PERRL Slow None          Neuro/Psych     Oriented x3:  Yes    Mood/Affect:  Normal            Slit Lamp and Fundus Exam     Slit Lamp Exam       Right Left    Lids/Lashes Normal Normal    Conjunctiva/Sclera White and quiet White and quiet    Cornea Clear Clear    Anterior Chamber Deep and quiet Deep and quiet    Iris Round and reactive Round and reactive    Lens pciol pciol    Vitreous Normal Normal

## 2018-06-05 NOTE — PROGRESS NOTES
HPI     Dr. Jess Quintana, OD at [facility]   Myopia , +1.25 readers     s/p phaco w/IOL OS 5/17/18   S/P Phaco w/IOL OD 03/29/2018     Eye meds:   PF BID OS   Ketoralac BID OS     Pt here for overdue post op check OS. [facility] feels [facility] not [facility].    Pt denies pain OS. No f/f    Last edited by Veronica Ferrari MD on 6/5/2018  9:07 AM. (History)            Assessment /Plan     For exam results, see Encounter Report.    Status post cataract extraction and insertion of intraocular lens, right    Status post cataract extraction and insertion of intraocular lens, left    Myopia of both eyes        S/p phaco w/IOL OS 5/17/18   S/P Phaco w/IOL OD 03/29/2018     Doing well, [facility], recommend OTC readers for near.  F/up Dr. Quintana for continued eye care, DFE.    Me prn

## 2018-06-17 RX ORDER — CARVEDILOL 6.25 MG/1
TABLET ORAL
Qty: 180 TABLET | Refills: 1 | Status: SHIPPED | OUTPATIENT
Start: 2018-06-17 | End: 2019-02-10 | Stop reason: SDUPTHER

## 2018-06-17 RX ORDER — AMLODIPINE BESYLATE 10 MG/1
TABLET ORAL
Qty: 90 TABLET | Refills: 1 | Status: SHIPPED | OUTPATIENT
Start: 2018-06-17 | End: 2019-02-10 | Stop reason: SDUPTHER

## 2018-07-05 RX ORDER — TRIAMTERENE/HYDROCHLOROTHIAZID 37.5-25 MG
TABLET ORAL
Qty: 90 TABLET | Refills: 1 | Status: SHIPPED | OUTPATIENT
Start: 2018-07-05 | End: 2018-12-11 | Stop reason: SDUPTHER

## 2018-10-23 ENCOUNTER — TELEPHONE (OUTPATIENT)
Dept: INTERNAL MEDICINE | Facility: CLINIC | Age: 63
End: 2018-10-23

## 2018-10-23 DIAGNOSIS — Z12.31 ENCOUNTER FOR SCREENING MAMMOGRAM FOR BREAST CANCER: Primary | ICD-10-CM

## 2018-10-23 NOTE — TELEPHONE ENCOUNTER
----- Message from Linda Coffman sent at 10/23/2018 11:09 AM CDT -----  Contact: Rubia Mathews [phone]  Caller is requesting to schedule their annual screening mammogram appointment. Order is not listed in Epic.  Please enter order and contact patient to schedule.    Where would they like the mammogram performed?:  [facility]   Additional information:

## 2018-12-11 RX ORDER — TRIAMTERENE/HYDROCHLOROTHIAZID 37.5-25 MG
1 TABLET ORAL DAILY
Qty: 90 TABLET | Refills: 0 | Status: SHIPPED | OUTPATIENT
Start: 2018-12-11 | End: 2019-03-19 | Stop reason: SDUPTHER

## 2019-01-21 DIAGNOSIS — Z12.39 BREAST CANCER SCREENING: ICD-10-CM

## 2019-02-10 RX ORDER — AMLODIPINE BESYLATE 10 MG/1
TABLET ORAL
Qty: 90 TABLET | Refills: 0 | Status: SHIPPED | OUTPATIENT
Start: 2019-02-10 | End: 2019-05-15 | Stop reason: SDUPTHER

## 2019-02-10 RX ORDER — CARVEDILOL 6.25 MG/1
TABLET ORAL
Qty: 180 TABLET | Refills: 0 | Status: SHIPPED | OUTPATIENT
Start: 2019-02-10 | End: 2019-05-19 | Stop reason: SDUPTHER

## 2019-03-12 ENCOUNTER — OFFICE VISIT (OUTPATIENT)
Dept: URGENT CARE | Facility: CLINIC | Age: 64
End: 2019-03-12
Payer: COMMERCIAL

## 2019-03-12 VITALS
TEMPERATURE: 102 F | SYSTOLIC BLOOD PRESSURE: 142 MMHG | HEART RATE: 67 BPM | BODY MASS INDEX: 38.71 KG/M2 | HEIGHT: 61 IN | DIASTOLIC BLOOD PRESSURE: 67 MMHG | WEIGHT: 205 LBS | OXYGEN SATURATION: 98 %

## 2019-03-12 DIAGNOSIS — R19.7 DIARRHEA, UNSPECIFIED TYPE: ICD-10-CM

## 2019-03-12 DIAGNOSIS — J06.9 UPPER RESPIRATORY TRACT INFECTION, UNSPECIFIED TYPE: Primary | ICD-10-CM

## 2019-03-12 DIAGNOSIS — J02.9 SORE THROAT: ICD-10-CM

## 2019-03-12 DIAGNOSIS — R50.9 FEVER, UNSPECIFIED FEVER CAUSE: ICD-10-CM

## 2019-03-12 DIAGNOSIS — R11.2 NAUSEA AND VOMITING, INTRACTABILITY OF VOMITING NOT SPECIFIED, UNSPECIFIED VOMITING TYPE: ICD-10-CM

## 2019-03-12 DIAGNOSIS — R05.9 COUGH: ICD-10-CM

## 2019-03-12 LAB
CTP QC/QA: YES
CTP QC/QA: YES
FLUAV AG NPH QL: NEGATIVE
FLUBV AG NPH QL: NEGATIVE
S PYO RRNA THROAT QL PROBE: NEGATIVE

## 2019-03-12 PROCEDURE — 87804 INFLUENZA ASSAY W/OPTIC: CPT | Mod: QW,S$GLB,, | Performed by: PHYSICIAN ASSISTANT

## 2019-03-12 PROCEDURE — 87880 STREP A ASSAY W/OPTIC: CPT | Mod: QW,S$GLB,, | Performed by: PHYSICIAN ASSISTANT

## 2019-03-12 PROCEDURE — 87804 POCT INFLUENZA A/B: ICD-10-PCS | Mod: 59,QW,S$GLB, | Performed by: PHYSICIAN ASSISTANT

## 2019-03-12 PROCEDURE — 99213 OFFICE O/P EST LOW 20 MIN: CPT | Mod: S$GLB,,, | Performed by: PHYSICIAN ASSISTANT

## 2019-03-12 PROCEDURE — 3008F PR BODY MASS INDEX (BMI) DOCUMENTED: ICD-10-PCS | Mod: CPTII,S$GLB,, | Performed by: PHYSICIAN ASSISTANT

## 2019-03-12 PROCEDURE — 3077F PR MOST RECENT SYSTOLIC BLOOD PRESSURE >= 140 MM HG: ICD-10-PCS | Mod: CPTII,S$GLB,, | Performed by: PHYSICIAN ASSISTANT

## 2019-03-12 PROCEDURE — 99213 PR OFFICE/OUTPT VISIT, EST, LEVL III, 20-29 MIN: ICD-10-PCS | Mod: S$GLB,,, | Performed by: PHYSICIAN ASSISTANT

## 2019-03-12 PROCEDURE — 87880 POCT RAPID STREP A: ICD-10-PCS | Mod: QW,S$GLB,, | Performed by: PHYSICIAN ASSISTANT

## 2019-03-12 PROCEDURE — 3078F DIAST BP <80 MM HG: CPT | Mod: CPTII,S$GLB,, | Performed by: PHYSICIAN ASSISTANT

## 2019-03-12 PROCEDURE — 3008F BODY MASS INDEX DOCD: CPT | Mod: CPTII,S$GLB,, | Performed by: PHYSICIAN ASSISTANT

## 2019-03-12 PROCEDURE — 3078F PR MOST RECENT DIASTOLIC BLOOD PRESSURE < 80 MM HG: ICD-10-PCS | Mod: CPTII,S$GLB,, | Performed by: PHYSICIAN ASSISTANT

## 2019-03-12 PROCEDURE — 3077F SYST BP >= 140 MM HG: CPT | Mod: CPTII,S$GLB,, | Performed by: PHYSICIAN ASSISTANT

## 2019-03-12 RX ORDER — HYDROXYZINE HYDROCHLORIDE 25 MG/1
25 TABLET, FILM COATED ORAL NIGHTLY
Refills: 2 | COMMUNITY
Start: 2018-12-05 | End: 2019-03-19 | Stop reason: ALTCHOICE

## 2019-03-12 RX ORDER — ACETAMINOPHEN 500 MG
1000 TABLET ORAL EVERY 6 HOURS PRN
Qty: 100 TABLET | Refills: 2 | Status: SHIPPED | OUTPATIENT
Start: 2019-03-12 | End: 2020-03-11

## 2019-03-12 RX ORDER — BISMUTH SUBSALICYLATE 525 MG/30ML
30 LIQUID ORAL 4 TIMES DAILY PRN
Qty: 240 ML | Refills: 2 | Status: SHIPPED | OUTPATIENT
Start: 2019-03-12 | End: 2019-05-03

## 2019-03-12 RX ORDER — SECUKINUMAB 150 MG/ML
INJECTION SUBCUTANEOUS
COMMUNITY
Start: 2019-03-12 | End: 2021-04-27

## 2019-03-12 RX ORDER — KETOCONAZOLE 20 MG/G
CREAM TOPICAL
Refills: 3 | COMMUNITY
Start: 2018-12-15 | End: 2019-03-19 | Stop reason: ALTCHOICE

## 2019-03-12 RX ORDER — BETAMETHASONE DIPROPIONATE 0.5 MG/G
CREAM TOPICAL
COMMUNITY
Start: 2019-01-02 | End: 2019-05-03

## 2019-03-12 RX ORDER — HYDROQUINONE 40 MG/G
1 CREAM TOPICAL DAILY
Refills: 2 | COMMUNITY
Start: 2019-02-22 | End: 2019-03-19 | Stop reason: ALTCHOICE

## 2019-03-12 RX ORDER — PROMETHAZINE HYDROCHLORIDE 6.25 MG/5ML
6.25 SYRUP ORAL 4 TIMES DAILY PRN
Qty: 120 ML | Refills: 0 | Status: SHIPPED | OUTPATIENT
Start: 2019-03-12 | End: 2019-03-19 | Stop reason: ALTCHOICE

## 2019-03-12 RX ORDER — ACETAMINOPHEN 500 MG
1000 TABLET ORAL
Status: COMPLETED | OUTPATIENT
Start: 2019-03-12 | End: 2019-03-12

## 2019-03-12 RX ORDER — GUAIFENESIN 600 MG/1
600 TABLET, EXTENDED RELEASE ORAL 2 TIMES DAILY
Qty: 40 TABLET | Refills: 0 | Status: SHIPPED | OUTPATIENT
Start: 2019-03-12 | End: 2019-04-01

## 2019-03-12 RX ORDER — ONDANSETRON 4 MG/1
4 TABLET, ORALLY DISINTEGRATING ORAL EVERY 8 HOURS PRN
Qty: 10 TABLET | Refills: 0 | Status: SHIPPED | OUTPATIENT
Start: 2019-03-12 | End: 2019-05-03

## 2019-03-12 RX ADMIN — Medication 1000 MG: at 01:03

## 2019-03-12 NOTE — LETTER
March 12, 2019      [facility]  [address] [address], [address]  [place] [address]  Phone: [phone]  Fax: [phone]       Patient: Rubia Mathews   YOB: 1955  Date of Visit: 03/12/2019    To Whom It May Concern:    Tone Mathews  was at [facility] on 03/12/2019. She may return to work/school on Thursday, March 14, 2019 with no restrictions. If you have any questions or concerns, or if I can be of further assistance, please do not hesitate to contact me.    Sincerely,      Abigail Ochoa PA-C

## 2019-03-12 NOTE — PATIENT INSTRUCTIONS
"  [place] Diet  Your healthcare provider may recommend a bland diet if you have an upset stomach. It consists of foods that are mild and easy to digest. It is better to eat small frequent meals rather than 3 large meals a day.    Beverages  OK: Fruit juices, non-caffeinated teas and coffee, non-carbonated woods  Avoid: Carbonated beverage, caffeinated tea and coffee, all alcoholic beverages  Bread  OK: Refined white, wheat or rye bread, maryann or soda crackers, Vida toast, plain rolls, bagels  Avoid: Whole-grain bread  Cereal  OK: Refined cereals: cooked or ready to eat  Avoid: Whole-grain cereals and granola, or those containing bran, seeds or nuts  Desserts  OK: Peanut butter and all others except those to "avoid"  Avoid: Chocolate, cocoa, coconut, popcorn, nuts, seeds, jam, marmalade  Fruits  OK: Canned, cooked, frozen or fresh fruits without seeds or tough skin  Avoid: Olives, skin and seeds of fruit  Meats  OK: All fresh or preserved meat, fish and fowl  Avoid: Any that are prepared with those spices to "avoid"  Cheese and eggs  OK: Eggs, cottage cheese, cream cheese, other cheeses  Avoid: All cheeses made with those spices to "avoid"  Potatoes and pasta  OK: Potato, rice, macaroni, noodles, spaghetti  Avoid: None  Soups  OK: All soups without heavy seasoning  Avoid: Soups made with those spices to "avoid"  Vegetables  OK: Canned, cooked, fresh or frozen mildly flavored vegetables without seeds, skins or coarse fiber  Avoid: Vegetables prepared with those spices to "avoid"; skin and seeds of vegetables and those with coarse fiber  Spices  OK: Salt, lemon and lime juice, vinegar, all extracts, paxton, cinnamon, thyme, mace, allspice, paprika  Avoid: Chili powder, cloves, pepper, seed spices, garlic, gravy pickles, highly seasoned salad dressings  Date Last Reviewed: 11/20/2015  © 3660-9730 [employer]. [address] [place] [address]. All rights reserved. This information is not intended as " a substitute for professional medical care. Always follow your healthcare professional's instructions.        Viral Upper Respiratory Illness (Adult)  You have a viral upper respiratory illness (URI), which is another term for the common cold. This illness is contagious during the first few days. It is spread through the air by coughing and sneezing. It may also be spread by direct contact (touching the sick person and then touching your own eyes, nose, or mouth). Frequent handwashing will decrease risk of spread. Most viral illnesses go away within 7 to 10 days with rest and simple home remedies. Sometimes the illness may last for several weeks. Antibiotics will not kill a virus, and they are generally not prescribed for this condition.    Home care  · If symptoms are severe, rest at home for the first 2 to 3 days. When you resume activity, don't let yourself get too tired.  · Avoid being exposed to cigarette smoke (yours or others).  · You may use acetaminophen or ibuprofen to control pain and fever, unless another medicine was prescribed. (Note: If you have chronic liver or kidney disease, have ever had a stomach ulcer or gastrointestinal bleeding, or are taking blood-thinning medicines, talk with your healthcare provider before using these medicines.) Aspirin should never be given to anyone under 18 years of age who is ill with a viral infection or fever. It may cause severe liver or brain damage.  · Your appetite may be poor, so a light diet is fine. Avoid dehydration by drinking 6 to 8 glasses of fluids per day (water, soft drinks, juices, tea, or soup). Extra fluids will help loosen secretions in the nose and lungs.  · Over-the-counter cold medicines will not shorten the length of time youre sick, but they may be helpful for the following symptoms: cough, sore throat, and nasal and sinus congestion. (Note: Do not use decongestants if you have high blood pressure.)  Follow-up care  Follow up with your  healthcare provider, or as advised.  When to seek medical advice  Call your healthcare provider right away if any of these occur:  · Cough with lots of colored sputum (mucus)  · Severe headache; face, neck, or ear pain  · Difficulty swallowing due to throat pain  · Fever of 100.4°F (38°C)  Call 911, or get immediate medical care  Call emergency services right away if any of these occur:  · Chest pain, shortness of breath, wheezing, or difficulty breathing  · Coughing up blood  · Inability to swallow due to throat pain  Date Last Reviewed: 9/13/2015  © 7208-0538 [employer]. [address] [place] [address]. All rights reserved. This information is not intended as a substitute for professional medical care. Always follow your healthcare professional's instructions.

## 2019-03-12 NOTE — PROGRESS NOTES
"Subjective:       Patient ID: Rubia Mathews is a 63 y.o. female.    Vitals:  height is 5' 1" (1.549 m) and weight is 93 kg (205 lb). Her oral temperature is 101.9 °F (38.8 °C) (abnormal). Her blood pressure is 142/67 (abnormal) and her pulse is 67. Her oxygen saturation is 98%.     Chief Complaint: Sore Throat    Patient presents today with sore throat, congestion with yellow sputum production .She also complains of diarrhea and body aches . Her child is sick in the household ,but he tested negative for the flu. Symptoms started yesterday.       Sore Throat    This is a new problem. The current episode started yesterday. The problem has been gradually worsening. Neither side of throat is experiencing more pain than the other. There has been no fever (99.3 ). The pain is at a severity of 6/10. The pain is moderate. Associated symptoms include congestion, coughing and diarrhea. Pertinent negatives include no headaches, shortness of breath or vomiting. She has tried nothing for the symptoms. The treatment provided no relief.       Constitution: Positive for fatigue, fever and generalized weakness. Negative for chills.   HENT: Positive for congestion and sore throat.    Neck: Negative for painful lymph nodes.   Cardiovascular: Negative for chest pain and leg swelling.   Eyes: Negative for double vision and blurred vision.   Respiratory: Positive for cough and sputum production. Negative for shortness of breath.    Gastrointestinal: Positive for diarrhea. Negative for nausea and vomiting.   Genitourinary: Negative for dysuria, frequency, urgency and history of kidney stones.   Musculoskeletal: Negative for joint pain, joint swelling, muscle cramps and muscle ache.   Skin: Negative for color change, pale, rash and bruising.   Allergic/Immunologic: Negative for seasonal allergies.   Neurological: Negative for dizziness, history of vertigo, light-headedness, passing out and headaches.   Hematologic/Lymphatic: " Negative for swollen lymph nodes.   Psychiatric/Behavioral: Negative for nervous/anxious, sleep disturbance and depression. The patient is not nervous/anxious.        Objective:       Results for orders placed or performed in visit on 03/12/19   POCT Influenza A/B   Result Value Ref Range    Rapid Influenza A Ag Negative Negative    Rapid Influenza B Ag Negative Negative     Acceptable Yes    POCT rapid strep A   Result Value Ref Range    Rapid Strep A Screen Negative Negative     Acceptable Yes        Physical Exam    Assessment:       1. Upper respiratory tract infection, unspecified type    2. Cough    3. Sore throat    4. Diarrhea, unspecified type    5. Fever, unspecified fever cause    6. Nausea and vomiting, intractability of vomiting not specified, unspecified vomiting type        Plan:         Upper respiratory tract infection, unspecified type  -     guaiFENesin (MUCINEX) 600 mg 12 hr tablet; Take 1 tablet (600 mg total) by mouth 2 (two) times daily. for 20 days  Dispense: 40 tablet; Refill: 0    Cough  -     POCT Influenza A/B  -     promethazine (PHENERGAN) 6.25 mg/5 mL syrup; Take 5 mLs (6.25 mg total) by mouth 4 (four) times daily as needed.  Dispense: 120 mL; Refill: 0    Sore throat  -     POCT rapid strep A  -     diphenhydrAMINE-aluminum-magnesium hydroxide-simethicone-lidocaine HCl 2%; Swish and spit 15 mLs every 4 (four) hours as needed.  Dispense: 1 Bottle; Refill: 0    Diarrhea, unspecified type  -     bismuth subsalicylate (PEPTO-BISMOL) 262 mg/15 mL suspension; Take 30 mLs by mouth 4 (four) times daily as needed for Indigestion.  Dispense: 240 mL; Refill: 2    Fever, unspecified fever cause  -     acetaminophen (TYLENOL EXTRA STRENGTH) 500 MG tablet; Take 2 tablets (1,000 mg total) by mouth every 6 (six) hours as needed for Pain.  Dispense: 100 tablet; Refill: 2    Nausea and vomiting, intractability of vomiting not specified, unspecified vomiting type  -      "ondansetron (ZOFRAN-ODT) 4 MG TbDL; Take 1 tablet (4 mg total) by mouth every 8 (eight) hours as needed.  Dispense: 10 tablet; Refill: 0  -     diphenhydrAMINE-aluminum-magnesium hydroxide-simethicone-lidocaine HCl 2%; Swish and spit 15 mLs every 4 (four) hours as needed.  Dispense: 1 Bottle; Refill: 0      Patient Instructions     [place] Diet  Your healthcare provider may recommend a bland diet if you have an upset stomach. It consists of foods that are mild and easy to digest. It is better to eat small frequent meals rather than 3 large meals a day.    Beverages  OK: Fruit juices, non-caffeinated teas and coffee, non-carbonated woods  Avoid: Carbonated beverage, caffeinated tea and coffee, all alcoholic beverages  Bread  OK: Refined white, wheat or rye bread, maryann or soda crackers, Vida toast, plain rolls, bagels  Avoid: Whole-grain bread  Cereal  OK: Refined cereals: cooked or ready to eat  Avoid: Whole-grain cereals and granola, or those containing bran, seeds or nuts  Desserts  OK: Peanut butter and all others except those to "avoid"  Avoid: Chocolate, cocoa, coconut, popcorn, nuts, seeds, jam, marmalade  Fruits  OK: Canned, cooked, frozen or fresh fruits without seeds or tough skin  Avoid: Olives, skin and seeds of fruit  Meats  OK: All fresh or preserved meat, fish and fowl  Avoid: Any that are prepared with those spices to "avoid"  Cheese and eggs  OK: Eggs, cottage cheese, cream cheese, other cheeses  Avoid: All cheeses made with those spices to "avoid"  Potatoes and pasta  OK: Potato, rice, macaroni, noodles, spaghetti  Avoid: None  Soups  OK: All soups without heavy seasoning  Avoid: Soups made with those spices to "avoid"  Vegetables  OK: Canned, cooked, fresh or frozen mildly flavored vegetables without seeds, skins or coarse fiber  Avoid: Vegetables prepared with those spices to "avoid"; skin and seeds of vegetables and those with coarse fiber  Spices  OK: Salt, lemon and lime juice, vinegar, all " extracts, paxton, cinnamon, thyme, mace, allspice, paprika  Avoid: Chili powder, cloves, pepper, seed spices, garlic, gravy pickles, highly seasoned salad dressings  Date Last Reviewed: 11/20/2015 © 2000-2017 [employer]. [address] [place] [address]. All rights reserved. This information is not intended as a substitute for professional medical care. Always follow your healthcare professional's instructions.        Viral Upper Respiratory Illness (Adult)  You have a viral upper respiratory illness (URI), which is another term for the common cold. This illness is contagious during the first few days. It is spread through the air by coughing and sneezing. It may also be spread by direct contact (touching the sick person and then touching your own eyes, nose, or mouth). Frequent handwashing will decrease risk of spread. Most viral illnesses go away within 7 to 10 days with rest and simple home remedies. Sometimes the illness may last for several weeks. Antibiotics will not kill a virus, and they are generally not prescribed for this condition.    Home care  · If symptoms are severe, rest at home for the first 2 to 3 days. When you resume activity, don't let yourself get too tired.  · Avoid being exposed to cigarette smoke (yours or others).  · You may use acetaminophen or ibuprofen to control pain and fever, unless another medicine was prescribed. (Note: If you have chronic liver or kidney disease, have ever had a stomach ulcer or gastrointestinal bleeding, or are taking blood-thinning medicines, talk with your healthcare provider before using these medicines.) Aspirin should never be given to anyone under 18 years of age who is ill with a viral infection or fever. It may cause severe liver or brain damage.  · Your appetite may be poor, so a light diet is fine. Avoid dehydration by drinking 6 to 8 glasses of fluids per day (water, soft drinks, juices, tea, or soup). Extra fluids will help  "loosen secretions in the nose and lungs.  · Over-the-counter cold medicines will not shorten the length of time youre sick, but they may be helpful for the following symptoms: cough, sore throat, and nasal and sinus congestion. (Note: Do not use decongestants if you have high blood pressure.)  Follow-up care  Follow up with your healthcare provider, or as advised.  When to seek medical advice  Call your healthcare provider right away if any of these occur:  · Cough with lots of colored sputum (mucus)  · Severe headache; face, neck, or ear pain  · Difficulty swallowing due to throat pain  · Fever of 100.4°F (38°C)  Call 911, or get immediate medical care  Call emergency services right away if any of these occur:  · Chest pain, shortness of breath, wheezing, or difficulty breathing  · Coughing up blood  · Inability to swallow due to throat pain  Date Last Reviewed: 9/13/2015  © 6749-0090 [employer]. [address] [place] [address]. All rights reserved. This information is not intended as a substitute for professional medical care. Always follow your healthcare professional's instructions.          FLU / COLD HOME CARES:  Discussed with patient need for blowing the nose to expel mucus outwards. Also advised sleeping propped up in bed to around 30 degrees to not allow mucus to pool in sinuses or throat. Patient was advised to do salt water gargles upon awakening and at bedtime, with a few in between during the day if wished. Patient was given "Daytime vs. Nighttime" cough suppressive medications.     COUGH / SORE THROAT HOME CARES:  Patient was recommended to continue with supportive cares such as lozenges, throat sprays, warm salt water gargles, OTC NSAIDs such as Ibuprofen or acetaminphen for discomfort of fevers above 101.0 degrees. Patient may benefit from an OTC antihistamine such as Zyrtec or Claritin for post nasal drip that may cause a sore throat.  The goal is to dry the sinuses and " disrupt the over production of mucus that is filling the sinus cavities and secondarily causing a sore throat.    Consistency with treatment is key and patient was informed that these medications may be needed for the next few weeks.

## 2019-03-15 ENCOUNTER — TELEPHONE (OUTPATIENT)
Dept: URGENT CARE | Facility: CLINIC | Age: 64
End: 2019-03-15

## 2019-03-19 ENCOUNTER — OFFICE VISIT (OUTPATIENT)
Dept: INTERNAL MEDICINE | Facility: CLINIC | Age: 64
End: 2019-03-19
Payer: COMMERCIAL

## 2019-03-19 VITALS
HEART RATE: 72 BPM | RESPIRATION RATE: 16 BRPM | HEIGHT: 61 IN | DIASTOLIC BLOOD PRESSURE: 72 MMHG | SYSTOLIC BLOOD PRESSURE: 132 MMHG | BODY MASS INDEX: 38.14 KG/M2 | TEMPERATURE: 98 F | WEIGHT: 202 LBS

## 2019-03-19 DIAGNOSIS — J45.21 MILD INTERMITTENT ASTHMATIC BRONCHITIS WITH ACUTE EXACERBATION: ICD-10-CM

## 2019-03-19 DIAGNOSIS — J20.8 ACUTE BACTERIAL BRONCHITIS: ICD-10-CM

## 2019-03-19 DIAGNOSIS — B96.89 ACUTE BACTERIAL BRONCHITIS: ICD-10-CM

## 2019-03-19 DIAGNOSIS — J11.1 FLU SYNDROME: Primary | ICD-10-CM

## 2019-03-19 PROCEDURE — 99214 OFFICE O/P EST MOD 30 MIN: CPT | Mod: 25,S$GLB,, | Performed by: INTERNAL MEDICINE

## 2019-03-19 PROCEDURE — 96372 THER/PROPH/DIAG INJ SC/IM: CPT | Mod: S$GLB,,, | Performed by: INTERNAL MEDICINE

## 2019-03-19 PROCEDURE — 99214 PR OFFICE/OUTPT VISIT, EST, LEVL IV, 30-39 MIN: ICD-10-PCS | Mod: 25,S$GLB,, | Performed by: INTERNAL MEDICINE

## 2019-03-19 PROCEDURE — 3075F PR MOST RECENT SYSTOLIC BLOOD PRESS GE 130-139MM HG: ICD-10-PCS | Mod: CPTII,S$GLB,, | Performed by: INTERNAL MEDICINE

## 2019-03-19 PROCEDURE — 3078F DIAST BP <80 MM HG: CPT | Mod: CPTII,S$GLB,, | Performed by: INTERNAL MEDICINE

## 2019-03-19 PROCEDURE — 99999 PR PBB SHADOW E&M-EST. PATIENT-LVL IV: ICD-10-PCS | Mod: PBBFAC,,, | Performed by: INTERNAL MEDICINE

## 2019-03-19 PROCEDURE — 99999 PR PBB SHADOW E&M-EST. PATIENT-LVL IV: CPT | Mod: PBBFAC,,, | Performed by: INTERNAL MEDICINE

## 2019-03-19 PROCEDURE — 3008F BODY MASS INDEX DOCD: CPT | Mod: CPTII,S$GLB,, | Performed by: INTERNAL MEDICINE

## 2019-03-19 PROCEDURE — 3008F PR BODY MASS INDEX (BMI) DOCUMENTED: ICD-10-PCS | Mod: CPTII,S$GLB,, | Performed by: INTERNAL MEDICINE

## 2019-03-19 PROCEDURE — 3075F SYST BP GE 130 - 139MM HG: CPT | Mod: CPTII,S$GLB,, | Performed by: INTERNAL MEDICINE

## 2019-03-19 PROCEDURE — 96372 PR INJECTION,THERAP/PROPH/DIAG2ST, IM OR SUBCUT: ICD-10-PCS | Mod: S$GLB,,, | Performed by: INTERNAL MEDICINE

## 2019-03-19 PROCEDURE — 3078F PR MOST RECENT DIASTOLIC BLOOD PRESSURE < 80 MM HG: ICD-10-PCS | Mod: CPTII,S$GLB,, | Performed by: INTERNAL MEDICINE

## 2019-03-19 RX ORDER — TRIAMCINOLONE ACETONIDE 40 MG/ML
40 INJECTION, SUSPENSION INTRA-ARTICULAR; INTRAMUSCULAR ONCE
Status: COMPLETED | OUTPATIENT
Start: 2019-03-19 | End: 2019-03-19

## 2019-03-19 RX ORDER — CODEINE PHOSPHATE AND GUAIFENESIN 10; 100 MG/5ML; MG/5ML
5 SOLUTION ORAL 3 TIMES DAILY PRN
Qty: 240 ML | Refills: 0 | Status: SHIPPED | OUTPATIENT
Start: 2019-03-19 | End: 2019-03-29

## 2019-03-19 RX ORDER — OSELTAMIVIR PHOSPHATE 75 MG/1
75 CAPSULE ORAL 2 TIMES DAILY
Qty: 10 CAPSULE | Refills: 0 | Status: SHIPPED | OUTPATIENT
Start: 2019-03-19 | End: 2019-03-24

## 2019-03-19 RX ORDER — DOXYCYCLINE 100 MG/1
100 CAPSULE ORAL 2 TIMES DAILY
Qty: 30 CAPSULE | Refills: 1 | Status: SHIPPED | OUTPATIENT
Start: 2019-03-19 | End: 2019-05-03 | Stop reason: ALTCHOICE

## 2019-03-19 RX ORDER — TRIAMTERENE/HYDROCHLOROTHIAZID 37.5-25 MG
1 TABLET ORAL DAILY
Qty: 90 TABLET | Refills: 0 | Status: SHIPPED | OUTPATIENT
Start: 2019-03-19 | End: 2019-08-22 | Stop reason: SDUPTHER

## 2019-03-19 RX ADMIN — TRIAMCINOLONE ACETONIDE 40 MG: 40 INJECTION, SUSPENSION INTRA-ARTICULAR; INTRAMUSCULAR at 11:03

## 2019-03-25 NOTE — PROGRESS NOTES
Subjective:       Patient ID: Rubia Mathews is a 63 y.o. female.    Chief Complaint: Cough (went to urgent care last tuesday and finished cough syrup.  0 pain today.)  HISTORY OF PRESENT ILLNESS:  A 63-year-old female patient of Dr. Bustos,   comes in for an illness that began approximately a week ago with temperature,   cough, sore throat and nausea.  The patient did not get a flu shot.  Her son was   sick.  She was seen in urgent care and had negative workup for flu and strep.    She was not treated with any antibiotics.  She was placed on Phenergan cough   syrup.  With that, she has developed increasing sputum production, green color,   especially at night and she is sleeping poorly as a result of that.  She has no   wheezing or chest pain.    PHYSICAL EXAMINATION:  VITAL SIGNS:  Normal.  SKIN:  Fair, healthy.  HEENT:  Clear.  NECK:  Shows no stiffness or adenopathy.  CHEST:  Clear.    IMPRESSION:  Flu type syndrome with acute bacterial bronchitis, probably   asthmatic bronchitis as well.  She was told it would probably be wise for her to   get on Tamiflu, which was ordered.  Given a prescription for Robitussin AC,   doxycycline.  Told to get on Mucinex, drink plenty of fluids and flush her nose   with saline.      JDC/HN  dd: 03/24/2019 22:39:39 (CDT)  td: 03/25/2019 04:40:38 (CDT)  Doc ID   #[id number]  Job ID #[id number]    CC:     Dict [id number]  HPI  Review of Systems   Constitutional: Positive for fatigue and fever.   HENT: Positive for congestion, sinus pressure and sore throat. Negative for hearing loss, sneezing and voice change.    Eyes: Negative for visual disturbance.   Respiratory: Positive for cough. Negative for chest tightness and shortness of breath.    Cardiovascular: Negative.  Negative for chest pain, palpitations and leg swelling.   Gastrointestinal: Negative.    Genitourinary: Negative for difficulty urinating, dysuria, flank pain, frequency, hematuria, menstrual problem, pelvic pain,  urgency, vaginal bleeding and vaginal discharge.   Musculoskeletal: Negative.  Negative for neck pain and neck stiffness.   Skin: Negative.    Neurological: Negative.  Negative for dizziness, seizures, light-headedness, numbness and headaches.   Psychiatric/Behavioral: Negative for agitation, behavioral problems, confusion and sleep disturbance. The patient is not nervous/anxious.        Objective:      Physical Exam   Constitutional: She is oriented to person, place, and time. She appears well-developed and well-nourished.   Eyes: Pupils are equal, round, and reactive to light. EOM are normal.   Neck: Normal range of motion. Neck supple. No JVD present. No thyromegaly present.   Cardiovascular: Normal rate, regular rhythm, normal heart sounds and intact distal pulses. Exam reveals no gallop.   No murmur heard.  Pulmonary/Chest: Breath sounds normal. She has no wheezes. She has no rales.   Abdominal: Soft. Bowel sounds are normal. She exhibits no mass. There is no tenderness.   Musculoskeletal: Normal range of motion.   Lymphadenopathy:     She has no cervical adenopathy.   Neurological: She is alert and oriented to person, place, and time. She has normal reflexes. No cranial nerve deficit.   Skin: No rash noted. No erythema.   Psychiatric: She has a normal mood and affect. Judgment normal.       Assessment:       1. Flu syndrome    2. Acute bacterial bronchitis    3. Mild intermittent asthmatic bronchitis with acute exacerbation        Plan:

## 2019-05-02 ENCOUNTER — TELEPHONE (OUTPATIENT)
Dept: INTERNAL MEDICINE | Facility: CLINIC | Age: 64
End: 2019-05-02

## 2019-05-02 ENCOUNTER — NURSE TRIAGE (OUTPATIENT)
Dept: ADMINISTRATIVE | Facility: CLINIC | Age: 64
End: 2019-05-02

## 2019-05-02 DIAGNOSIS — Z00.00 PREVENTATIVE HEALTH CARE: Primary | ICD-10-CM

## 2019-05-02 DIAGNOSIS — I10 ESSENTIAL HYPERTENSION: ICD-10-CM

## 2019-05-02 DIAGNOSIS — E78.2 MIXED HYPERLIPIDEMIA: ICD-10-CM

## 2019-05-02 NOTE — TELEPHONE ENCOUNTER
Reason for Disposition   Information only question and nurse able to answer    Protocols used: ST NO PROTOCOL CALL: INFORMATION ONLY-A-OH    Patient denies any symptoms at this time, wants to schedule an appointment with PCP due to wheezing at night going on for about a month now. Jose any SOB, Wheezing or difficulty breathing at this time. Appointment scheduled with Dr. Tiffanie townsend 07:40.

## 2019-05-02 NOTE — TELEPHONE ENCOUNTER
Labs ordered and added.    Screening mammogram order already in Epic dated 1/21/19.  Pt called. No answer. Left message to call back.

## 2019-05-02 NOTE — TELEPHONE ENCOUNTER
----- Message from Kamilah Moffett sent at 5/2/2019 10:20 AM CDT -----  Contact: Pt self Mobile/Home [phone]  Doctor appointment and lab have been scheduled.  Please link lab orders to the lab appointment.  Date of doctor appointment:  09/10/2019  Date of lab appointment:  09/03/2019  Physical or EP: Physical

## 2019-05-03 ENCOUNTER — OFFICE VISIT (OUTPATIENT)
Dept: INTERNAL MEDICINE | Facility: CLINIC | Age: 64
End: 2019-05-03
Payer: COMMERCIAL

## 2019-05-03 VITALS
SYSTOLIC BLOOD PRESSURE: 116 MMHG | WEIGHT: 202.19 LBS | HEART RATE: 72 BPM | BODY MASS INDEX: 38.17 KG/M2 | RESPIRATION RATE: 16 BRPM | DIASTOLIC BLOOD PRESSURE: 72 MMHG | HEIGHT: 61 IN | TEMPERATURE: 98 F

## 2019-05-03 DIAGNOSIS — K21.9 GASTROESOPHAGEAL REFLUX DISEASE, ESOPHAGITIS PRESENCE NOT SPECIFIED: ICD-10-CM

## 2019-05-03 DIAGNOSIS — J30.1 SEASONAL ALLERGIC RHINITIS DUE TO POLLEN: Primary | ICD-10-CM

## 2019-05-03 PROCEDURE — 3008F PR BODY MASS INDEX (BMI) DOCUMENTED: ICD-10-PCS | Mod: CPTII,S$GLB,, | Performed by: FAMILY MEDICINE

## 2019-05-03 PROCEDURE — 99999 PR PBB SHADOW E&M-EST. PATIENT-LVL III: ICD-10-PCS | Mod: PBBFAC,,, | Performed by: FAMILY MEDICINE

## 2019-05-03 PROCEDURE — 99999 PR PBB SHADOW E&M-EST. PATIENT-LVL III: CPT | Mod: PBBFAC,,, | Performed by: FAMILY MEDICINE

## 2019-05-03 PROCEDURE — 3078F DIAST BP <80 MM HG: CPT | Mod: CPTII,S$GLB,, | Performed by: FAMILY MEDICINE

## 2019-05-03 PROCEDURE — 99214 OFFICE O/P EST MOD 30 MIN: CPT | Mod: S$GLB,,, | Performed by: FAMILY MEDICINE

## 2019-05-03 PROCEDURE — 3008F BODY MASS INDEX DOCD: CPT | Mod: CPTII,S$GLB,, | Performed by: FAMILY MEDICINE

## 2019-05-03 PROCEDURE — 3074F SYST BP LT 130 MM HG: CPT | Mod: CPTII,S$GLB,, | Performed by: FAMILY MEDICINE

## 2019-05-03 PROCEDURE — 3074F PR MOST RECENT SYSTOLIC BLOOD PRESSURE < 130 MM HG: ICD-10-PCS | Mod: CPTII,S$GLB,, | Performed by: FAMILY MEDICINE

## 2019-05-03 PROCEDURE — 99214 PR OFFICE/OUTPT VISIT, EST, LEVL IV, 30-39 MIN: ICD-10-PCS | Mod: S$GLB,,, | Performed by: FAMILY MEDICINE

## 2019-05-03 PROCEDURE — 3078F PR MOST RECENT DIASTOLIC BLOOD PRESSURE < 80 MM HG: ICD-10-PCS | Mod: CPTII,S$GLB,, | Performed by: FAMILY MEDICINE

## 2019-05-03 RX ORDER — PANTOPRAZOLE SODIUM 40 MG/1
40 TABLET, DELAYED RELEASE ORAL DAILY
Qty: 30 TABLET | Refills: 11 | Status: SHIPPED | OUTPATIENT
Start: 2019-05-03 | End: 2020-06-15 | Stop reason: SDUPTHER

## 2019-05-03 RX ORDER — FLUTICASONE PROPIONATE 50 MCG
2 SPRAY, SUSPENSION (ML) NASAL DAILY
Qty: 16 G | Refills: 11 | Status: SHIPPED | OUTPATIENT
Start: 2019-05-03 | End: 2021-04-27

## 2019-05-03 NOTE — PROGRESS NOTES
Subjective:       Patient ID: Rubia Mathews is a 63 y.o. female.    Chief Complaint: Wheezing (mostly at night)    HPI 63-year-old  female presents to clinic today secondary to concerns of wheezing predominantly at night with increased nasal congestion, postnasal drip, and runny nose.  She denies any cough but does note occasional sensations of mucus in her throat which causes her to clear her throat.  She denies bringing up any mucus but does note frequently blowing her nose.  She also reports frequent heartburn for which she uses Jocy-[place].  She has also been using nasal saline and Vicks.  Review of Systems   Constitutional: Negative for appetite change, chills, fatigue and fever.   HENT: Positive for congestion, postnasal drip and rhinorrhea. Negative for ear pain, hearing loss, sinus pressure, sore throat and tinnitus.    Eyes: Negative for redness, itching and visual disturbance.   Respiratory: Positive for cough and wheezing. Negative for chest tightness and shortness of breath.    Cardiovascular: Negative for chest pain and palpitations.   Gastrointestinal: Positive for abdominal pain (Reflux). Negative for constipation, diarrhea, nausea and vomiting.   Genitourinary: Negative for decreased urine volume, difficulty urinating, dysuria, frequency, hematuria and urgency.   Musculoskeletal: Negative for back pain, myalgias, neck pain and neck stiffness.   Skin: Negative for rash.   Neurological: Negative for dizziness, light-headedness and headaches.   Psychiatric/Behavioral: Negative.        Objective:      Physical Exam   Constitutional: She is oriented to person, place, and time. She appears well-developed and well-nourished. No distress.   HENT:   Head: Normocephalic and atraumatic.   Right Ear: External ear normal.   Left Ear: External ear normal.   Nose: Mucosal edema and rhinorrhea present.   Mouth/Throat: Oropharynx is clear and moist. No oropharyngeal exudate.   Eyes: Pupils  are equal, round, and reactive to light. Conjunctivae and EOM are normal. Right eye exhibits no discharge. Left eye exhibits no discharge. No scleral icterus.   Neck: Normal range of motion. Neck supple. No JVD present. No tracheal deviation present. No thyromegaly present.   Cardiovascular: Normal rate, regular rhythm, normal heart sounds and intact distal pulses. Exam reveals no gallop and no friction rub.   No murmur heard.  Pulmonary/Chest: Effort normal and breath sounds normal. No stridor. No respiratory distress. She has no wheezes. She has no rales.   Abdominal: Soft. Bowel sounds are normal. She exhibits no distension and no mass. There is no tenderness. There is no rebound and no guarding.   Musculoskeletal: Normal range of motion. She exhibits no edema or tenderness.   Lymphadenopathy:     She has no cervical adenopathy.   Neurological: She is alert and oriented to person, place, and time.   Skin: Skin is warm and dry. No rash noted. She is not diaphoretic. No erythema. No pallor.   Psychiatric: She has a normal mood and affect. Her behavior is normal. Judgment and thought content normal.   Nursing note and vitals reviewed.      Assessment:       1. Seasonal allergic rhinitis due to pollen    2. Gastroesophageal reflux disease, esophagitis presence not specified        Plan:       Seasonal allergic rhinitis due to pollen  -     fluticasone propionate (FLONASE) 50 mcg/actuation nasal spray; 2 sprays (100 mcg total) by Each Nare route once daily.  Dispense: 16 g; Refill: 11   Over-the-counter Zyrtec nightly.    Gastroesophageal reflux disease, esophagitis presence not specified  -     pantoprazole (PROTONIX) 40 MG tablet; Take 1 tablet (40 mg total) by mouth once daily.  Dispense: 30 tablet; Refill: 11    Return to clinic as needed if symptoms persist or worsen.

## 2019-05-11 ENCOUNTER — HOSPITAL ENCOUNTER (OUTPATIENT)
Dept: RADIOLOGY | Facility: HOSPITAL | Age: 64
Discharge: HOME | End: 2019-05-11
Attending: INTERNAL MEDICINE
Payer: COMMERCIAL

## 2019-05-11 VITALS — WEIGHT: 202 LBS | HEIGHT: 61 IN | BODY MASS INDEX: 38.14 KG/M2

## 2019-05-11 DIAGNOSIS — Z12.39 BREAST CANCER SCREENING: ICD-10-CM

## 2019-05-11 PROCEDURE — 77067 MAMMO DIGITAL SCREENING BILAT WITH TOMOSYNTHESIS_CAD: ICD-10-PCS | Mod: 26,,, | Performed by: RADIOLOGY

## 2019-05-11 PROCEDURE — 77063 BREAST TOMOSYNTHESIS BI: CPT | Mod: 26,,, | Performed by: RADIOLOGY

## 2019-05-11 PROCEDURE — 77067 SCR MAMMO BI INCL CAD: CPT | Mod: 26,,, | Performed by: RADIOLOGY

## 2019-05-11 PROCEDURE — 77067 SCR MAMMO BI INCL CAD: CPT | Mod: TC

## 2019-05-11 PROCEDURE — 77063 MAMMO DIGITAL SCREENING BILAT WITH TOMOSYNTHESIS_CAD: ICD-10-PCS | Mod: 26,,, | Performed by: RADIOLOGY

## 2019-05-13 ENCOUNTER — TELEPHONE (OUTPATIENT)
Dept: INTERNAL MEDICINE | Facility: CLINIC | Age: 64
End: 2019-05-13

## 2019-05-13 NOTE — TELEPHONE ENCOUNTER
----- Message from Stefanie Leal MD sent at 5/12/2019  8:10 PM CDT -----  Please note that your mammogram was read as follows  Impression:  There is no mammographic evidence of malignancy.  Stefanie Penny

## 2019-05-15 RX ORDER — AMLODIPINE BESYLATE 10 MG/1
TABLET ORAL
Qty: 90 TABLET | Refills: 0 | Status: SHIPPED | OUTPATIENT
Start: 2019-05-15 | End: 2019-08-22 | Stop reason: SDUPTHER

## 2019-05-19 RX ORDER — CARVEDILOL 6.25 MG/1
TABLET ORAL
Qty: 180 TABLET | Refills: 0 | Status: SHIPPED | OUTPATIENT
Start: 2019-05-19 | End: 2019-08-22 | Stop reason: SDUPTHER

## 2019-08-22 RX ORDER — AMLODIPINE BESYLATE 10 MG/1
TABLET ORAL
Qty: 90 TABLET | Refills: 0 | Status: SHIPPED | OUTPATIENT
Start: 2019-08-22 | End: 2019-09-10 | Stop reason: SDUPTHER

## 2019-08-22 RX ORDER — CARVEDILOL 6.25 MG/1
TABLET ORAL
Qty: 180 TABLET | Refills: 0 | Status: SHIPPED | OUTPATIENT
Start: 2019-08-22 | End: 2019-09-10 | Stop reason: SDUPTHER

## 2019-08-22 RX ORDER — TRIAMTERENE/HYDROCHLOROTHIAZID 37.5-25 MG
TABLET ORAL
Qty: 90 TABLET | Refills: 0 | Status: SHIPPED | OUTPATIENT
Start: 2019-08-22 | End: 2019-09-10 | Stop reason: SDUPTHER

## 2019-09-10 ENCOUNTER — OFFICE VISIT (OUTPATIENT)
Dept: INTERNAL MEDICINE | Facility: CLINIC | Age: 64
End: 2019-09-10
Payer: COMMERCIAL

## 2019-09-10 VITALS
SYSTOLIC BLOOD PRESSURE: 136 MMHG | HEIGHT: 61 IN | BODY MASS INDEX: 37.55 KG/M2 | TEMPERATURE: 99 F | HEART RATE: 64 BPM | RESPIRATION RATE: 18 BRPM | DIASTOLIC BLOOD PRESSURE: 78 MMHG | WEIGHT: 198.88 LBS

## 2019-09-10 DIAGNOSIS — I10 ESSENTIAL HYPERTENSION: ICD-10-CM

## 2019-09-10 DIAGNOSIS — G47.00 INSOMNIA, UNSPECIFIED TYPE: ICD-10-CM

## 2019-09-10 DIAGNOSIS — Z00.00 ANNUAL PHYSICAL EXAM: Primary | ICD-10-CM

## 2019-09-10 DIAGNOSIS — E78.5 HYPERLIPIDEMIA, UNSPECIFIED HYPERLIPIDEMIA TYPE: ICD-10-CM

## 2019-09-10 DIAGNOSIS — Z12.11 COLON CANCER SCREENING: ICD-10-CM

## 2019-09-10 PROCEDURE — 99396 PREV VISIT EST AGE 40-64: CPT | Mod: S$GLB,,, | Performed by: INTERNAL MEDICINE

## 2019-09-10 PROCEDURE — 3075F SYST BP GE 130 - 139MM HG: CPT | Mod: CPTII,S$GLB,, | Performed by: INTERNAL MEDICINE

## 2019-09-10 PROCEDURE — 3078F DIAST BP <80 MM HG: CPT | Mod: CPTII,S$GLB,, | Performed by: INTERNAL MEDICINE

## 2019-09-10 PROCEDURE — 3078F PR MOST RECENT DIASTOLIC BLOOD PRESSURE < 80 MM HG: ICD-10-PCS | Mod: CPTII,S$GLB,, | Performed by: INTERNAL MEDICINE

## 2019-09-10 PROCEDURE — 99999 PR PBB SHADOW E&M-EST. PATIENT-LVL III: CPT | Mod: PBBFAC,,, | Performed by: INTERNAL MEDICINE

## 2019-09-10 PROCEDURE — 3075F PR MOST RECENT SYSTOLIC BLOOD PRESS GE 130-139MM HG: ICD-10-PCS | Mod: CPTII,S$GLB,, | Performed by: INTERNAL MEDICINE

## 2019-09-10 PROCEDURE — 99999 PR PBB SHADOW E&M-EST. PATIENT-LVL III: ICD-10-PCS | Mod: PBBFAC,,, | Performed by: INTERNAL MEDICINE

## 2019-09-10 PROCEDURE — 99396 PR PREVENTIVE VISIT,EST,40-64: ICD-10-PCS | Mod: S$GLB,,, | Performed by: INTERNAL MEDICINE

## 2019-09-10 RX ORDER — AMLODIPINE BESYLATE 10 MG/1
10 TABLET ORAL DAILY
Qty: 90 TABLET | Refills: 3 | Status: SHIPPED | OUTPATIENT
Start: 2019-09-10 | End: 2020-10-05

## 2019-09-10 RX ORDER — TRIAMTERENE/HYDROCHLOROTHIAZID 37.5-25 MG
1 TABLET ORAL DAILY
Qty: 90 TABLET | Refills: 3 | Status: SHIPPED | OUTPATIENT
Start: 2019-09-10 | End: 2020-10-05

## 2019-09-10 RX ORDER — TRAZODONE HYDROCHLORIDE 50 MG/1
50 TABLET ORAL NIGHTLY
Qty: 90 TABLET | Refills: 0 | Status: SHIPPED | OUTPATIENT
Start: 2019-09-10 | End: 2019-12-03 | Stop reason: SDUPTHER

## 2019-09-10 RX ORDER — CARVEDILOL 6.25 MG/1
6.25 TABLET ORAL 2 TIMES DAILY
Qty: 180 TABLET | Refills: 3 | Status: SHIPPED | OUTPATIENT
Start: 2019-09-10 | End: 2020-10-05

## 2019-09-10 NOTE — PROGRESS NOTES
65 yo female presents for Annual PE  Smoker,    Social alcohol consumer.                                                                                                 FAMILY HISTORY:    Dad d. 78 of lymphoma.    Mom 80, + diabetic, controlled with diet, weight loss.  She is hypertensive.     2 sisters, both with hypertension.                                                                                                                    SCREENING TESTS:    Chol/lab, reviewed- CO2 elevated  Colonoscopy up-to-date 07/2008 with Dr. Gee,rec 5 yrs  (Pt to check on co-pay w/ insurance, will take [employer])  Mammogram 2019   [facility], TAHBSO  DEXA, no fx  EYE exam, UTD  DDS exam, pending; full upper and partial lower  VACCINATIONS:   Tdap, 4/1/2014                                                           MEDICATIONS: Reviewed                                                                                                                             REVIEW OF SYSTEMS:    No fever or chills,   No chest pain, shortness of breath,  PND, or orthopnea.    + glasses, no diplopia  + PND, occ cough, no mucus  No angina or GUY  No cough or wheezing  No Abdominal pain or change in bowels or blood in stool  No dysuria or hematuria  Back pain, chronic, intermittent  Remainder of review negative except as previously noted.                                                                                                                                                                                                                                    PHYSICAL EXAM:    S                                          GENERAL:  She is alert and oriented, in no acute distress.  She is well      developed, well nourished, conversant and cooperative, well groomed,         pleasant female.                                                             EYES: Conjunctivae and lids unremarkable.  Sclerae anicteric.    Pupils are  reactive.    ENT: Canal  and TMs unremarkable.    Nasal mucosa/, turbinates, w/o exudate or lesions         Oropharynx slightly injected.w/o exudate  Sinuses are nontender.                        NECK:  Supple.  No thyromegaly noted.                                        RESPIRATORY:  Efforts unlabored.                                             LUNGS:  Clear to auscultation.                                               HEART:  Regular rate and rhythm.  No carotid bruits noted,   1+ pedal pulse. No edema.                                                                   ABDOMEN:  Bowel sounds present, soft, nontender.    No hepatosplenomegaly   appreciated.                                                                 MSK: Gait normal.  No clubbing, cyanosis, or edema.   NEURO: MALDONADO. No tremor noted  SKIN: Warm and dry.                                                                              IMPRESSION:                                                                    Annual PE                                                          Family history of diabetes.                                               HTN, stable          HLD, asx                                                                H/o sarcoidosis.        Insomnia, since recent divorce- denied depression                                                                                                                                                                                       PLAN:     [employer]  Fasting lab  UA  Consider c-scope  Continue present meds  Rx trazodone 50mg hs  Call prn  RTC 6 mos BP check w/ lab

## 2019-09-14 ENCOUNTER — LAB VISIT (OUTPATIENT)
Dept: LAB | Facility: HOSPITAL | Age: 64
End: 2019-09-14
Attending: INTERNAL MEDICINE
Payer: COMMERCIAL

## 2019-09-14 DIAGNOSIS — Z00.00 ANNUAL PHYSICAL EXAM: ICD-10-CM

## 2019-09-14 LAB
ALBUMIN SERPL BCP-MCNC: 3.6 G/DL (ref 3.5–5.2)
ALP SERPL-CCNC: 89 U/L (ref 55–135)
ALT SERPL W/O P-5'-P-CCNC: 13 U/L (ref 10–44)
ANION GAP SERPL CALC-SCNC: 11 MMOL/L (ref 8–16)
AST SERPL-CCNC: 15 U/L (ref 10–40)
BASOPHILS # BLD AUTO: 0.03 K/UL (ref 0–0.2)
BASOPHILS NFR BLD: 0.5 % (ref 0–1.9)
BILIRUB SERPL-MCNC: 0.3 MG/DL (ref 0.1–1)
BUN SERPL-MCNC: 9 MG/DL (ref 8–23)
CALCIUM SERPL-MCNC: 9.7 MG/DL (ref 8.7–10.5)
CHLORIDE SERPL-SCNC: 103 MMOL/L (ref 95–110)
CHOLEST SERPL-MCNC: 205 MG/DL (ref 120–199)
CHOLEST/HDLC SERPL: 3.8 {RATIO} (ref 2–5)
CO2 SERPL-SCNC: 28 MMOL/L (ref 23–29)
CREAT SERPL-MCNC: 0.8 MG/DL (ref 0.5–1.4)
DIFFERENTIAL METHOD: ABNORMAL
EOSINOPHIL # BLD AUTO: 0.1 K/UL (ref 0–0.5)
EOSINOPHIL NFR BLD: 2.1 % (ref 0–8)
ERYTHROCYTE [DISTWIDTH] IN BLOOD BY AUTOMATED COUNT: 15.1 % (ref 11.5–14.5)
EST. GFR  (AFRICAN AMERICAN): >60 ML/MIN/1.73 M^2
EST. GFR  (NON AFRICAN AMERICAN): >60 ML/MIN/1.73 M^2
GLUCOSE SERPL-MCNC: 97 MG/DL (ref 70–110)
HCT VFR BLD AUTO: 41.1 % (ref 37–48.5)
HDLC SERPL-MCNC: 54 MG/DL (ref 40–75)
HDLC SERPL: 26.3 % (ref 20–50)
HGB BLD-MCNC: 13.1 G/DL (ref 12–16)
IMM GRANULOCYTES # BLD AUTO: 0.02 K/UL (ref 0–0.04)
IMM GRANULOCYTES NFR BLD AUTO: 0.3 % (ref 0–0.5)
LDLC SERPL CALC-MCNC: 134.8 MG/DL (ref 63–159)
LYMPHOCYTES # BLD AUTO: 1.9 K/UL (ref 1–4.8)
LYMPHOCYTES NFR BLD: 31.1 % (ref 18–48)
MCH RBC QN AUTO: 27.3 PG (ref 27–31)
MCHC RBC AUTO-ENTMCNC: 31.9 G/DL (ref 32–36)
MCV RBC AUTO: 86 FL (ref 82–98)
MONOCYTES # BLD AUTO: 0.5 K/UL (ref 0.3–1)
MONOCYTES NFR BLD: 8.4 % (ref 4–15)
NEUTROPHILS # BLD AUTO: 3.5 K/UL (ref 1.8–7.7)
NEUTROPHILS NFR BLD: 57.6 % (ref 38–73)
NONHDLC SERPL-MCNC: 151 MG/DL
NRBC BLD-RTO: 0 /100 WBC
PLATELET # BLD AUTO: 272 K/UL (ref 150–350)
PMV BLD AUTO: 11.8 FL (ref 9.2–12.9)
POTASSIUM SERPL-SCNC: 3.6 MMOL/L (ref 3.5–5.1)
PROT SERPL-MCNC: 7.7 G/DL (ref 6–8.4)
RBC # BLD AUTO: 4.8 M/UL (ref 4–5.4)
SODIUM SERPL-SCNC: 142 MMOL/L (ref 136–145)
TRIGL SERPL-MCNC: 81 MG/DL (ref 30–150)
TSH SERPL DL<=0.005 MIU/L-ACNC: 0.68 UIU/ML (ref 0.4–4)
WBC # BLD AUTO: 6.08 K/UL (ref 3.9–12.7)

## 2019-09-14 PROCEDURE — 80061 LIPID PANEL: CPT

## 2019-09-14 PROCEDURE — 85025 COMPLETE CBC W/AUTO DIFF WBC: CPT

## 2019-09-14 PROCEDURE — 80053 COMPREHEN METABOLIC PANEL: CPT

## 2019-09-14 PROCEDURE — 36415 COLL VENOUS BLD VENIPUNCTURE: CPT | Mod: PO

## 2019-09-14 PROCEDURE — 84443 ASSAY THYROID STIM HORMONE: CPT

## 2019-09-16 ENCOUNTER — TELEPHONE (OUTPATIENT)
Dept: INTERNAL MEDICINE | Facility: CLINIC | Age: 64
End: 2019-09-16

## 2019-09-16 NOTE — TELEPHONE ENCOUNTER
----- Message from Stefanie Leal MD sent at 9/15/2019  7:42 PM CDT -----  Please note that your thyroid test was normal.  Your chemistries were normal, your potassium was in the normal range this time, please follow a potassium rich diet.  You are not anemic.  Your cholesterol was elevated at 205; when your risk for a cardiovascular event in the next 10 years is calculated it is  9.2%,  A level that merits a recommendation for taking a cholesterol medication. Please advise if you are agreeable to starting   A cholesterol medication and a prescription will be sent to your pharmacy.   Stefanie Penny    
Noted.  
none

## 2019-11-22 ENCOUNTER — TELEPHONE (OUTPATIENT)
Dept: INTERNAL MEDICINE | Facility: CLINIC | Age: 64
End: 2019-11-22

## 2019-11-22 ENCOUNTER — OFFICE VISIT (OUTPATIENT)
Dept: INTERNAL MEDICINE | Facility: CLINIC | Age: 64
End: 2019-11-22
Payer: COMMERCIAL

## 2019-11-22 VITALS
BODY MASS INDEX: 37.83 KG/M2 | SYSTOLIC BLOOD PRESSURE: 118 MMHG | TEMPERATURE: 102 F | HEIGHT: 61 IN | WEIGHT: 200.38 LBS | DIASTOLIC BLOOD PRESSURE: 70 MMHG | HEART RATE: 86 BPM

## 2019-11-22 DIAGNOSIS — H60.501 ACUTE OTITIS EXTERNA OF RIGHT EAR, UNSPECIFIED TYPE: ICD-10-CM

## 2019-11-22 DIAGNOSIS — R50.9 FEVER, UNSPECIFIED FEVER CAUSE: Primary | ICD-10-CM

## 2019-11-22 DIAGNOSIS — R52 BODY ACHES: ICD-10-CM

## 2019-11-22 DIAGNOSIS — J02.9 PHARYNGITIS, UNSPECIFIED ETIOLOGY: ICD-10-CM

## 2019-11-22 LAB
INFLUENZA A, MOLECULAR: NEGATIVE
INFLUENZA B, MOLECULAR: NEGATIVE
SPECIMEN SOURCE: NORMAL

## 2019-11-22 PROCEDURE — 99999 PR PBB SHADOW E&M-EST. PATIENT-LVL III: CPT | Mod: PBBFAC,,, | Performed by: NURSE PRACTITIONER

## 2019-11-22 PROCEDURE — 99999 PR PBB SHADOW E&M-EST. PATIENT-LVL III: ICD-10-PCS | Mod: PBBFAC,,, | Performed by: NURSE PRACTITIONER

## 2019-11-22 PROCEDURE — 99213 OFFICE O/P EST LOW 20 MIN: CPT | Mod: S$GLB,,, | Performed by: NURSE PRACTITIONER

## 2019-11-22 PROCEDURE — 87502 INFLUENZA DNA AMP PROBE: CPT | Mod: PO

## 2019-11-22 PROCEDURE — 99213 PR OFFICE/OUTPT VISIT, EST, LEVL III, 20-29 MIN: ICD-10-PCS | Mod: S$GLB,,, | Performed by: NURSE PRACTITIONER

## 2019-11-22 RX ORDER — OSELTAMIVIR PHOSPHATE 75 MG/1
75 CAPSULE ORAL 2 TIMES DAILY
Qty: 10 CAPSULE | Refills: 0 | Status: SHIPPED | OUTPATIENT
Start: 2019-11-22 | End: 2019-11-22 | Stop reason: CLARIF

## 2019-11-22 RX ORDER — OSELTAMIVIR PHOSPHATE 75 MG/1
75 CAPSULE ORAL 2 TIMES DAILY
Qty: 10 CAPSULE | Refills: 0 | Status: SHIPPED | OUTPATIENT
Start: 2019-11-22 | End: 2019-11-27

## 2019-11-22 RX ORDER — AMOXICILLIN 500 MG/1
500 TABLET, FILM COATED ORAL EVERY 12 HOURS
Qty: 14 TABLET | Refills: 0 | Status: SHIPPED | OUTPATIENT
Start: 2019-11-22 | End: 2019-11-29

## 2019-11-22 NOTE — PROGRESS NOTES
[facility] Note    Chief Complaint      Chief Complaint   Patient presents with    Sore Throat    Generalized Body Aches    Fever    Headache    Nausea    Vomiting     History of Present Illness      Rubia Mathews is a 64 y.o. female patient of Dr. Bustos who is new to me and presents today for c/o head congestion, PND, sore throat, chills, body aches, fatigue, HA, n/v, fever Tmax 101.6, and coughing up thick phlegm all over the past 2-3 days. Hasn't taken any OTC meds, didn't get flu vaccine this season yet.     Problem List Items Addressed This Visit     None      Visit Diagnoses     Fever, unspecified fever cause    -  Primary    Relevant Medications    amoxicillin (AMOXIL) 500 MG Tab    Other Relevant Orders    Influenza A & B by Molecular (Completed)    Pharyngitis, unspecified etiology        Relevant Medications    amoxicillin (AMOXIL) 500 MG Tab    Acute otitis externa of right ear, unspecified type        Relevant Medications    amoxicillin (AMOXIL) 500 MG Tab    Body aches        Relevant Medications    amoxicillin (AMOXIL) 500 MG Tab    Other Relevant Orders    Influenza A & B by Molecular (Completed)          Health Maintenance   Topic Date Due    Pneumococcal Vaccine (Medium Risk) (1 of 1 - PPSV23) 05/06/1976    Colonoscopy  07/03/2018    Mammogram  05/11/2021    TETANUS VACCINE  04/01/2024    Lipid Panel  09/14/2024    Hepatitis C Screening  Completed       Past Medical History:   Diagnosis Date    Adjustment disorder with mixed anxiety and depressed mood     Diverticulitis of colon (without mention of hemorrhage)(562.11)     Diverticulosis of colon (without mention of hemorrhage)     HLD (hyperlipidemia)     HTN (hypertension)     Lumbago     Mitral valve disorder     Mitral valve disorders(424.0)     Pain in joint, shoulder region     Sarcoidosis        Past Surgical History:   Procedure Laterality Date    CATARACT EXTRACTION W/  INTRAOCULAR LENS  IMPLANT Right 2018    Dr. Ferrari    HYSTERECTOMY      TOTAL ABDOMINAL HYSTERECTOMY W/ BILATERAL SALPINGOOPHORECTOMY      vocal cord noduules         family history includes Cancer in her father; Diabetes in her mother; Heart attack (age of onset: 85) in her mother; Hypertension in her brother, mother, sister, and sister; Kidney failure in her son; Transient ischemic attack in her sister.    Social History     Tobacco Use    Smoking status: Current Some Day Smoker     Packs/day: 0.50     Types: Cigarettes     Last attempt to quit: 3/3/2019     Years since quittin.7    Smokeless tobacco: Former User    Tobacco comment: a pack/week-trying to quit   Substance Use Topics    Alcohol use: Yes     Comment: Social - Drinks wine     Drug use: No       Review of Systems   Constitutional: Positive for chills and fever.   HENT: Positive for congestion, sinus pain and sore throat.    Eyes: Negative for blurred vision.   Respiratory: Positive for cough. Negative for shortness of breath and wheezing.    Cardiovascular: Negative for chest pain, palpitations and leg swelling.   Gastrointestinal: Positive for nausea and vomiting. Negative for abdominal pain, constipation and diarrhea.   Genitourinary: Negative for dysuria.   Musculoskeletal: Positive for myalgias.   Skin: Negative for rash.   Neurological: Positive for weakness and headaches. Negative for dizziness.   Psychiatric/Behavioral: Negative for depression. The patient is not nervous/anxious.         Outpatient Encounter Medications as of 2019   Medication Sig Dispense Refill    acetaminophen (TYLENOL EXTRA STRENGTH) 500 MG tablet Take 2 tablets (1,000 mg total) by mouth every 6 (six) hours as needed for Pain. 100 tablet 2    amLODIPine (NORVASC) 10 MG tablet Take 1 tablet (10 mg total) by mouth once daily. 90 tablet 3    carvedilol (COREG) 6.25 MG tablet Take 1 tablet (6.25 mg total) by mouth 2 (two) times daily. 180 tablet 3    COSENTYX PEN, 2  "PENS, 150 mg/mL PnIj       fluticasone propionate (FLONASE) 50 mcg/actuation nasal spray 2 sprays (100 mcg total) by Each Nare route once daily. 16 g 11    pantoprazole (PROTONIX) 40 MG tablet Take 1 tablet (40 mg total) by mouth once daily. 30 tablet 11    traZODone (DESYREL) 50 MG tablet Take 1 tablet (50 mg total) by mouth every evening. 90 tablet 0    triamterene-hydrochlorothiazide 37.5-25 mg (MAXZIDE-25) 37.5-25 mg per tablet Take 1 tablet by mouth once daily. 90 tablet 3    amoxicillin (AMOXIL) 500 MG Tab Take 1 tablet (500 mg total) by mouth every 12 (twelve) hours. for 7 days 14 tablet 0     No facility-administered encounter medications on file as of 11/22/2019.         Review of patient's allergies indicates:   Allergen Reactions    Losartan Hives, Swelling and Rash    Ciprofloxacin      visual disturbance    Lisinopril      coughing    Zithromax [azithromycin] Diarrhea       Physical Exam      Vital Signs  Temp: (!) 101.6 °F (38.7 °C)  Temp src: Oral  Pulse: 86  BP: 118/70  BP Location: Left arm  Patient Position: Sitting  Pain Score: 0-No pain  Height and Weight  Height: 5' 1" (154.9 cm)  Weight: 90.9 kg (200 lb 6.4 oz)  BSA (Calculated - sq m): 1.98 sq meters  BMI (Calculated): 37.9  Weight in (lb) to have BMI = 25: 132]    Physical Exam   Constitutional: She is oriented to person, place, and time. She appears well-developed and well-nourished.   HENT:   Head: Normocephalic and atraumatic.   Right Ear: External ear normal.   Left Ear: External ear normal.   Mouth/Throat: Oropharynx is clear and moist.   Redness to bilateral ear canals, w/ right clear effusion noted, erythema to oropharynx, probable strep   Eyes: Pupils are equal, round, and reactive to light. Conjunctivae and EOM are normal.   Neck: Normal range of motion. Neck supple.   Cardiovascular: Normal rate, regular rhythm, normal heart sounds and intact distal pulses.   No murmur heard.  Pulmonary/Chest: Effort normal and breath " sounds normal. She exhibits no tenderness.   Coarse upper BS noted   Abdominal: Soft.   Musculoskeletal: Normal range of motion.   Lymphadenopathy:     She has no cervical adenopathy.   Neurological: She is alert and oriented to person, place, and time.   Skin: Skin is warm and dry.   Psychiatric: She has a normal mood and affect. Her behavior is normal. Judgment and thought content normal.   Nursing note and vitals reviewed.       Laboratory:  CBC:  Recent Labs   Lab Result Units 09/14/19  1009   WBC K/uL 6.08   RBC M/uL 4.80   Hemoglobin g/dL 13.1   Hematocrit % 41.1   Platelets K/uL 272   Mean Corpuscular Volume fL 86   Mean Corpuscular Hemoglobin pg 27.3   Mean Corpuscular Hemoglobin Conc g/dL 31.9*     CMP:  Recent Labs   Lab Result Units 09/14/19  1009   Glucose mg/dL 97   Calcium mg/dL 9.7   Albumin g/dL 3.6   Total Protein g/dL 7.7   Sodium mmol/L 142   Potassium mmol/L 3.6   CO2 mmol/L 28   Chloride mmol/L 103   BUN, Bld mg/dL 9   Alkaline Phosphatase U/L 89   ALT U/L 13   AST U/L 15   Total Bilirubin mg/dL 0.3     URINALYSIS:  No results for input(s): COLORU, CLARITYU, SPECGRAV, PHUR, PROTEINUA, GLUCOSEU, BILIRUBINCON, BLOODU, WBCU, RBCU, BACTERIA, MUCUS, NITRITE, LEUKOCYTESUR, UROBILINOGEN, HYALINECASTS in the last 2160 hours.   LIPIDS:  Recent Labs   Lab Result Units 09/14/19  1009   TSH uIU/mL 0.678   HDL mg/dL 54   Cholesterol mg/dL 205*   Triglycerides mg/dL 81   LDL Cholesterol mg/dL 134.8   Hdl/Cholesterol Ratio % 26.3   Non-HDL Cholesterol mg/dL 151   Total Cholesterol/HDL Ratio  3.8     TSH:  Recent Labs   Lab Result Units 09/14/19  1009   TSH uIU/mL 0.678     A1C:  No results for input(s): HGBA1C in the last 2160 hours.      Assessment/Plan     Rubia Mathews is a 64 y.o.female with:    1. Pharyngitis, unspecified etiology  - amoxicillin (AMOXIL) 500 MG Tab; Take 1 tablet (500 mg total) by mouth every 12 (twelve) hours. for 7 days  Dispense: 14 tablet; Refill: 0    2. Acute otitis  externa of right ear, unspecified type  - amoxicillin (AMOXIL) 500 MG Tab; Take 1 tablet (500 mg total) by mouth every 12 (twelve) hours. for 7 days  Dispense: 14 tablet; Refill: 0    3. Fever, unspecified fever cause  - Influenza A & B by Molecular  - amoxicillin (AMOXIL) 500 MG Tab; Take 1 tablet (500 mg total) by mouth every 12 (twelve) hours. for 7 days  Dispense: 14 tablet; Refill: 0    4. Body aches  - Influenza A & B by Molecular  - amoxicillin (AMOXIL) 500 MG Tab; Take 1 tablet (500 mg total) by mouth every 12 (twelve) hours. for 7 days  Dispense: 14 tablet; Refill: 0      -Continue current medications and maintain follow up with specialists.  Return to clinic as needed for any concerns       Erin Jiminez, NP-C [facility] [place]

## 2019-11-22 NOTE — LETTER
November 22, 2019      [place] [facility]                                                                                                                                                       [address]  [place] [address]  Phone: [phone]  Fax: [phone]       Patient: Rubia Mathews   YOB: 1955  Date of Visit: 11/22/2019    To Whom It May Concern:    Tone Mathews  was at [facility] on 11/22/2019. She may not return to work today, awaiting influenza results. If you have any questions or concerns, or if I can be of further assistance, please do not hesitate to contact me.    Sincerely,        Uma Lucas, NP

## 2019-11-26 ENCOUNTER — TELEPHONE (OUTPATIENT)
Dept: INTERNAL MEDICINE | Facility: CLINIC | Age: 64
End: 2019-11-26

## 2019-11-26 NOTE — TELEPHONE ENCOUNTER
----- Message from Aditya Dumont sent at 11/26/2019  9:51 AM CST -----  Contact: self/[phone]  Pt is calling to see if she can get medication  For a cough sent to her pharmacy. She states that mostly at night she coughs up a lot. Please advise.        Thank You

## 2019-12-03 RX ORDER — TRAZODONE HYDROCHLORIDE 50 MG/1
50 TABLET ORAL NIGHTLY
Qty: 90 TABLET | Refills: 3 | Status: SHIPPED | OUTPATIENT
Start: 2019-12-03 | End: 2021-01-04 | Stop reason: SDUPTHER

## 2019-12-24 ENCOUNTER — PATIENT MESSAGE (OUTPATIENT)
Dept: ADMINISTRATIVE | Facility: OTHER | Age: 64
End: 2019-12-24

## 2019-12-24 ENCOUNTER — PATIENT OUTREACH (OUTPATIENT)
Dept: ADMINISTRATIVE | Facility: OTHER | Age: 64
End: 2019-12-24

## 2020-05-03 NOTE — PROGRESS NOTES
CC: left leg pain     64 y.o. female w/ HTN presents today for Left leg pain, w/ hx LBP w/ left radicular sx  Started- March, behind the knee and top of calf  Actually thinks it started when she was visiting [facility] of [facility] in 1/2020 and did a lot of walking  Then improved when she got home then recurred  Trauma- n/a  Pain level- 4/10  Pain quality-ache, worse in am  Pain location-post knee w/o erythema, edema or warmth  Tx- ibuprofen w/o significant relief  Associated factors- hx back pain w/ left radicular   Pt denied any back pains or radicular sx w/ this episode    MEDCARD: Reviewed    ROS:  No fever, or chills ++  night sweats behind her neck only  No dysphagia or chest pain  No GERD or abdominal pain  No change in bowel or bladder function  No paresthesias or limb numbness or weakness  + rash on chin  Remainder of review negative except as previously noted    PAST MEDICAL HX: Reviewed  PAST SURGICAL HX:Reviewed  SOCIAL HX: Reviewed  FAMILY HX: Reviewed    PHYSICAL EXAM:  VS: As noted  GENERAL: WDWN, A&O, NAD, conversant and co-operative  EYES: Conj/lids unremarkable, sclera anicteric  MUSCULOSKELETAL: Gait normal. No CCE noted  Back: Negative SLR - right and left negative  Left LE- knee w/ tenderness posterior w/o erythema or warmth or edema, no instability, + crepitus  NEUROLOGIC: MALDONADO. No tremor noted  SKIN: Warm and dry, 1-2 cm rash under the right chin  ( pt reported she used ketoconazole from her prior derm  And it didn't help)  Elbows + psoriasis    IMPRESSION:  knee pain, left  LBP, left radicular asx  HTN, stable  Night sweats- ~ 3-4 am, just behind the neck  Rash on chin- trial BNP x 2 weeks    PLAN:  X-ray left knee  Consider US  Consider Ortho  Ice packs  Rx naproxen 500mg BID w/ food and 8 oz liquid    Take 3-5 days regular then if better may take prn  Consult Derm  Call prn

## 2020-05-05 ENCOUNTER — OFFICE VISIT (OUTPATIENT)
Dept: INTERNAL MEDICINE | Facility: CLINIC | Age: 65
End: 2020-05-05
Payer: MEDICAID

## 2020-05-05 ENCOUNTER — HOSPITAL ENCOUNTER (OUTPATIENT)
Dept: RADIOLOGY | Facility: HOSPITAL | Age: 65
Discharge: HOME | End: 2020-05-05
Attending: INTERNAL MEDICINE
Payer: MEDICAID

## 2020-05-05 VITALS
OXYGEN SATURATION: 98 % | SYSTOLIC BLOOD PRESSURE: 130 MMHG | DIASTOLIC BLOOD PRESSURE: 70 MMHG | BODY MASS INDEX: 37 KG/M2 | HEIGHT: 61 IN | HEART RATE: 76 BPM | WEIGHT: 196 LBS | TEMPERATURE: 98 F

## 2020-05-05 DIAGNOSIS — I10 ESSENTIAL HYPERTENSION: ICD-10-CM

## 2020-05-05 DIAGNOSIS — L40.9 PSORIASIS: ICD-10-CM

## 2020-05-05 DIAGNOSIS — M25.562 ACUTE PAIN OF LEFT KNEE: ICD-10-CM

## 2020-05-05 DIAGNOSIS — R21 RASH: ICD-10-CM

## 2020-05-05 DIAGNOSIS — M25.562 ACUTE PAIN OF LEFT KNEE: Primary | ICD-10-CM

## 2020-05-05 PROCEDURE — 73562 X-RAY EXAM OF KNEE 3: CPT | Mod: 26,LT,, | Performed by: RADIOLOGY

## 2020-05-05 PROCEDURE — 99214 OFFICE O/P EST MOD 30 MIN: CPT | Mod: PBBFAC,25,PO | Performed by: INTERNAL MEDICINE

## 2020-05-05 PROCEDURE — 99999 PR PBB SHADOW E&M-EST. PATIENT-LVL IV: ICD-10-PCS | Mod: PBBFAC,,, | Performed by: INTERNAL MEDICINE

## 2020-05-05 PROCEDURE — 99214 OFFICE O/P EST MOD 30 MIN: CPT | Mod: S$PBB,,, | Performed by: INTERNAL MEDICINE

## 2020-05-05 PROCEDURE — 73562 XR KNEE 3 VIEW LEFT: ICD-10-PCS | Mod: 26,LT,, | Performed by: RADIOLOGY

## 2020-05-05 PROCEDURE — 73562 X-RAY EXAM OF KNEE 3: CPT | Mod: TC,PO,LT

## 2020-05-05 PROCEDURE — 99999 PR PBB SHADOW E&M-EST. PATIENT-LVL IV: CPT | Mod: PBBFAC,,, | Performed by: INTERNAL MEDICINE

## 2020-05-05 PROCEDURE — 99214 PR OFFICE/OUTPT VISIT, EST, LEVL IV, 30-39 MIN: ICD-10-PCS | Mod: S$PBB,,, | Performed by: INTERNAL MEDICINE

## 2020-05-05 RX ORDER — NAPROXEN 500 MG/1
500 TABLET ORAL 2 TIMES DAILY WITH MEALS
Qty: 60 TABLET | Refills: 2 | Status: SHIPPED | OUTPATIENT
Start: 2020-05-05 | End: 2020-06-15

## 2020-05-05 NOTE — PROGRESS NOTES
"Chief Complaint  Chief Complaint   Patient presents with    Leg Pain     left    Hot Flashes       HPI  Rubia Mathews is a 64 y.o. female with PMHx of HTN, lower back pain and psoriasis that presents for left leg pain.  Patient says the pain started in March and it is located behind the knee and at the top of the calf.  The pain is worse in the morning and she feels it when walking or bending the knee.  She describes the pain as sharp at times and it causes her to "buckle."  She notices the pain everyday and has tried to take ibuprofen with no relief.  The patient denies any recent trauma to the area and has not had any changes in physical activity. She has not noticed any swelling in the leg. She denies having any back pain today.  Patient denies HA, fever, chills, SOB, chest pain, abdominal pain, n/v.    Patient notes a skin lesion underneath a chin and was hoping to have a dermatology referral.          PAST MEDICAL HISTORY:  Past Medical History:   Diagnosis Date    Adjustment disorder with mixed anxiety and depressed mood     Diverticulitis of colon (without mention of hemorrhage)(562.11)     Diverticulosis of colon (without mention of hemorrhage)     HLD (hyperlipidemia)     HTN (hypertension)     Lumbago     Mitral valve disorder     Mitral valve disorders(424.0)     Pain in joint, shoulder region     Sarcoidosis        PAST SURGICAL HISTORY:  Past Surgical History:   Procedure Laterality Date    CATARACT EXTRACTION W/  INTRAOCULAR LENS IMPLANT Right 03/29/2018    Dr. Ferrari    HYSTERECTOMY      TOTAL ABDOMINAL HYSTERECTOMY W/ BILATERAL SALPINGOOPHORECTOMY      vocal cord noduules         SOCIAL HISTORY:  Social History     Socioeconomic History    Marital status:      Spouse name: Not on file    Number of children: Not on file    Years of education: Not on file    Highest education level: Not on file   Occupational History     Employer: [employer]   Social " Needs    Financial resource strain: Not on file    Food insecurity:     Worry: Not on file     Inability: Not on file    Transportation needs:     Medical: Not on file     Non-medical: Not on file   Tobacco Use    Smoking status: Current Some Day Smoker     Packs/day: 0.50     Types: Cigarettes     Last attempt to quit: 3/3/2019     Years since quittin.1    Smokeless tobacco: Former User    Tobacco comment: a pack/week-trying to quit   Substance and Sexual Activity    Alcohol use: Yes     Comment: Social - Drinks wine     Drug use: No    Sexual activity: Yes     Partners: Male   Lifestyle    Physical activity:     Days per week: Not on file     Minutes per session: Not on file    Stress: Not on file   Relationships    Social connections:     Talks on phone: Not on file     Gets together: Not on file     Attends Oriental orthodox service: Not on file     Active member of club or organization: Not on file     Attends meetings of clubs or organizations: Not on file     Relationship status: Not on file   Other Topics Concern    Not on file   Social History Narrative    Son s/p renal transplant = > dx focal sclerosing glomerulonephritis. She lives with her . She  her first  after 17 years due to a crack problem. She has a son. He has focal segmental glomeronephritis. She is a smoker who is trying to quit. 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 Her  is employed in [employer]. She drinks alcohol occaisionally. She doesn't have a history of substance. Her  has been having affairs. She is planning to leave once she is financially able. She could be at risk for STD's.    She is walking 3 days a week.                    FAMILY HISTORY:  Family History   Problem Relation Age of Onset    Cancer Father         d. 78 lymphoma    Diabetes Mother     Hypertension Mother     Heart attack Mother 85        s/p stent    Hypertension Sister      Hypertension Brother     Hypertension Sister     Transient ischemic attack Sister         no residual effects    Kidney failure Son         s/p transplant, on dialysis, on list again    Amblyopia Neg Hx     Blindness Neg Hx     Cataracts Neg Hx     Glaucoma Neg Hx     Macular degeneration Neg Hx     Retinal detachment Neg Hx     Strabismus Neg Hx     Stroke Neg Hx     Thyroid disease Neg Hx        ALLERGIES AND MEDICATIONS: updated and reviewed.  Review of patient's allergies indicates:   Allergen Reactions    Losartan Hives, Swelling and Rash    Ciprofloxacin      visual disturbance    Lisinopril      coughing    Zithromax [azithromycin] Diarrhea     Current Outpatient Medications   Medication Sig Dispense Refill    amLODIPine (NORVASC) 10 MG tablet Take 1 tablet (10 mg total) by mouth once daily. 90 tablet 3    carvedilol (COREG) 6.25 MG tablet Take 1 tablet (6.25 mg total) by mouth 2 (two) times daily. 180 tablet 3    COSENTYX PEN, 2 PENS, 150 mg/mL PnIj       fluticasone propionate (FLONASE) 50 mcg/actuation nasal spray 2 sprays (100 mcg total) by Each Nare route once daily. 16 g 11    traZODone (DESYREL) 50 MG tablet TAKE 1 TABLET (50 MG TOTAL) BY MOUTH EVERY EVENING. 90 tablet 3    triamterene-hydrochlorothiazide 37.5-25 mg (MAXZIDE-25) 37.5-25 mg per tablet Take 1 tablet by mouth once daily. 90 tablet 3    naproxen (NAPROSYN) 500 MG tablet Take 1 tablet (500 mg total) by mouth 2 (two) times daily with meals. And 8+ oz liquid 60 tablet 2    pantoprazole (PROTONIX) 40 MG tablet Take 1 tablet (40 mg total) by mouth once daily. 30 tablet 11     No current facility-administered medications for this visit.          ROS  Review of Systems   Constitutional: Negative for activity change, chills and fever.   HENT: Negative.    Eyes: Negative.    Respiratory: Negative for cough, chest tightness and shortness of breath.    Cardiovascular: Negative for chest pain and leg swelling.  "  Gastrointestinal: Negative for abdominal distention, abdominal pain, constipation and vomiting.   Musculoskeletal: Negative for back pain and joint swelling.        Pain behind the knee and at the top of the calf     Skin: Positive for rash ( underneath the chin).           Physical Exam  Vitals:    05/05/20 0858   BP: 130/70   BP Location: Right arm   Patient Position: Sitting   BP Method: Medium (Manual)   Pulse: 76   Temp: 98.1 °F (36.7 °C)   TempSrc: Oral   SpO2: 98%   Weight: 88.9 kg (195 lb 15.8 oz)   Height: 5' 1" (1.549 m)    Body mass index is 37.03 kg/m².  Weight: 88.9 kg (195 lb 15.8 oz)   Height: 5' 1" (154.9 cm)   Physical Exam   Cardiovascular: Normal heart sounds and intact distal pulses.   Pulmonary/Chest: Breath sounds normal.   Musculoskeletal: She exhibits tenderness ( behind the left knee  - pain elicited on palpation of the upper calf and popliteal fossa). She exhibits no edema or deformity.   Pain elicited by extension of the knee   Skin: Rash ( lesion beneath chin) noted.         Health Maintenance       Date Due Completion Date    Pneumococcal Vaccine (Medium Risk) (1 of 1 - PPSV23) 05/06/1976 ---    Shingles Vaccine (1 of 2) 05/06/2007 ---    Colonoscopy 07/03/2018 7/3/2008    Influenza Vaccine (Season Ended) 09/01/2020 ---    Mammogram 05/11/2021 5/11/2019    TETANUS VACCINE 04/01/2024 4/1/2014    Lipid Panel 09/14/2024 9/14/2019            Assessment and Plan:    1. Pain of left lower extremity - popliteal fossa and upper calf; possibly due to arthritic change; possibly due to baker's cyst   - xray of the knee ordered   - naproxen 500 mg BID prescribed   - patient counseled on medication use, ice, and rest for the area   - patient to RTC for further imaging if pain does not subside - U/S left LL   - consider Ortho consult    2. Skin lesion beneath the chin and psoriasis   - referral to dermatology placed   - counseled to use neosporin on the lesion to see if it will heal    3.  HTN   - " stable      Keiry Veloz  MS4

## 2020-05-26 ENCOUNTER — TELEPHONE (OUTPATIENT)
Dept: INTERNAL MEDICINE | Facility: CLINIC | Age: 65
End: 2020-05-26

## 2020-05-26 DIAGNOSIS — M79.662 PAIN OF LEFT CALF: ICD-10-CM

## 2020-05-26 DIAGNOSIS — M25.569 ACUTE KNEE PAIN, UNSPECIFIED LATERALITY: Primary | ICD-10-CM

## 2020-05-26 NOTE — TELEPHONE ENCOUNTER
Spoke to pt. Pt stated that she was seen earlier this month for her leg. She stated that she was supposed to call back if her symptoms did not improve. Pt stated that her leg is not better. Still painful. Naproxen not helping. Pt stated that she was told that she may need an ultrasound.  Please advise.

## 2020-05-26 NOTE — TELEPHONE ENCOUNTER
----- Message from Crissy Petersen sent at 5/26/2020  9:06 AM CDT -----  Contact: self    Patient called in regards to scheduling a follow up luz with Dr edouard patient has medicaid insurance soon as possible please advise

## 2020-05-28 ENCOUNTER — PATIENT OUTREACH (OUTPATIENT)
Dept: ADMINISTRATIVE | Facility: OTHER | Age: 65
End: 2020-05-28

## 2020-05-28 NOTE — TELEPHONE ENCOUNTER
Spoke to pt. Pt advised of referral and ultrasound order.  Pt stated that she is already scheduled with Orthopedics.    Order for u/s sent to referral coordinators for scheduling.

## 2020-06-03 ENCOUNTER — CLINICAL SUPPORT (OUTPATIENT)
Dept: CARDIOLOGY | Facility: CLINIC | Age: 65
End: 2020-06-03
Attending: INTERNAL MEDICINE
Payer: MEDICAID

## 2020-06-03 DIAGNOSIS — M79.662 PAIN OF LEFT CALF: ICD-10-CM

## 2020-06-03 PROCEDURE — 93971 CV US DOPPLER VENOUS LEG LEFT (CUPID ONLY): ICD-10-PCS | Mod: 26,S$PBB,LT, | Performed by: INTERNAL MEDICINE

## 2020-06-03 PROCEDURE — 93971 EXTREMITY STUDY: CPT | Mod: PBBFAC,PO,LT | Performed by: INTERNAL MEDICINE

## 2020-06-09 ENCOUNTER — PATIENT OUTREACH (OUTPATIENT)
Dept: ADMINISTRATIVE | Facility: OTHER | Age: 65
End: 2020-06-09

## 2020-06-15 ENCOUNTER — OFFICE VISIT (OUTPATIENT)
Dept: ORTHOPEDICS | Facility: CLINIC | Age: 65
End: 2020-06-15
Payer: MEDICAID

## 2020-06-15 VITALS
HEIGHT: 61 IN | BODY MASS INDEX: 36.82 KG/M2 | WEIGHT: 195 LBS | DIASTOLIC BLOOD PRESSURE: 76 MMHG | SYSTOLIC BLOOD PRESSURE: 128 MMHG

## 2020-06-15 DIAGNOSIS — K21.9 GASTROESOPHAGEAL REFLUX DISEASE, ESOPHAGITIS PRESENCE NOT SPECIFIED: ICD-10-CM

## 2020-06-15 DIAGNOSIS — M25.562 ACUTE PAIN OF LEFT KNEE: Primary | ICD-10-CM

## 2020-06-15 PROCEDURE — 99999 PR PBB SHADOW E&M-EST. PATIENT-LVL III: CPT | Mod: PBBFAC,,, | Performed by: ORTHOPAEDIC SURGERY

## 2020-06-15 PROCEDURE — 99999 PR PBB SHADOW E&M-EST. PATIENT-LVL III: ICD-10-PCS | Mod: PBBFAC,,, | Performed by: ORTHOPAEDIC SURGERY

## 2020-06-15 PROCEDURE — 99204 OFFICE O/P NEW MOD 45 MIN: CPT | Mod: S$PBB,,, | Performed by: ORTHOPAEDIC SURGERY

## 2020-06-15 PROCEDURE — 99204 PR OFFICE/OUTPT VISIT, NEW, LEVL IV, 45-59 MIN: ICD-10-PCS | Mod: S$PBB,,, | Performed by: ORTHOPAEDIC SURGERY

## 2020-06-15 PROCEDURE — 97110 THERAPEUTIC EXERCISES: CPT | Mod: ,,, | Performed by: ORTHOPAEDIC SURGERY

## 2020-06-15 PROCEDURE — 99213 OFFICE O/P EST LOW 20 MIN: CPT | Mod: PBBFAC,PN | Performed by: ORTHOPAEDIC SURGERY

## 2020-06-15 PROCEDURE — 97110 PR THERAPEUTIC EXERCISES: ICD-10-PCS | Mod: ,,, | Performed by: ORTHOPAEDIC SURGERY

## 2020-06-15 RX ORDER — MELOXICAM 15 MG/1
15 TABLET ORAL DAILY
Qty: 30 TABLET | Refills: 0 | Status: SHIPPED | OUTPATIENT
Start: 2020-06-15 | End: 2020-07-12

## 2020-06-15 RX ORDER — PANTOPRAZOLE SODIUM 40 MG/1
40 TABLET, DELAYED RELEASE ORAL DAILY
Qty: 30 TABLET | Refills: 11 | Status: SHIPPED | OUTPATIENT
Start: 2020-06-15 | End: 2021-06-15 | Stop reason: SDUPTHER

## 2020-06-15 NOTE — PROGRESS NOTES
CC: left knee pain    65 y.o. Female presents today for evaluation of her left knee pain. Patient admits to left knee pain beginning approximately 3 months ago without known mechanism of injury. She indicates her pain will radiate distally to her left calf when she walks for longer periods of time. She also admits to medial knee pain in addition to experiencing mechanical symptoms and feelings of instability. She describes the quality of her pain as throbbing. She denies recent falls or trauma, although she feels her episodes of instability are growing more frequent.  How long: Patient admits to left knee pain beginning approximately 3 months ago.   What makes it better: Patient admits to improved pain while at rest.   What makes it worse: Patient admits to increased pain first thing in the morning, standing from a seated position and with walking.   Does it radiate: Patient states her pain radiates from the medial aspect of her left knee and it will ra  Attempted treatments: Patient states she has been taking ibuprofen as needed in addition to applying ice packs and topical pain relieving creams. She denies the use of bracing. Denies formal physical therapy. Denies history of injection or surgery.   Pain score: 1/10  Any mechanical symptoms: Patient admits to mechanical symptoms.   Feelings of instability: Patient admits to feelings of instability.   Affect on ADLs: Patient admits to this problem affecting her ability to perform ADLs.     REVIEW OF SYSTEMS:   Constitution: Patient denies fever, chills, night sweats, and weight changes.  Eyes: Patient denies eye pain or vision changes.  HENT: Patient denies headache, ear pain, sore throat, or nasal discharge.  CVS: Patient denies chest pain.  Lungs: Patient denies shortness of breath or cough.  Abd: Patient denies stomach pain, nausea, or vomiting.  Skin: Patient denies skin rash or itching.    Hematologic/Lymphatic: Patient denies easy bruising.   Musculoskeletal:  Patient denies recent falls. See HPI.  Psych: Patient denies any current anxiety or nervousness.    PAST MEDICAL HISTORY:   Past Medical History:   Diagnosis Date    Adjustment disorder with mixed anxiety and depressed mood     Diverticulitis of colon (without mention of hemorrhage)(562.11)     Diverticulosis of colon (without mention of hemorrhage)     HLD (hyperlipidemia)     HTN (hypertension)     Lumbago     Mitral valve disorder     Mitral valve disorders(424.0)     Pain in joint, shoulder region     Sarcoidosis        PAST SURGICAL HISTORY:   Past Surgical History:   Procedure Laterality Date    CATARACT EXTRACTION W/  INTRAOCULAR LENS IMPLANT Right 03/29/2018    Dr. Ferrari    HYSTERECTOMY      TOTAL ABDOMINAL HYSTERECTOMY W/ BILATERAL SALPINGOOPHORECTOMY      vocal cord noduules         FAMILY HISTORY:   Family History   Problem Relation Age of Onset    Cancer Father         d. 78 lymphoma    Diabetes Mother     Hypertension Mother     Heart attack Mother 85        s/p stent    Hypertension Sister     Hypertension Brother     Hypertension Sister     Transient ischemic attack Sister         no residual effects    Kidney failure Son         s/p transplant, on dialysis, on list again    Amblyopia Neg Hx     Blindness Neg Hx     Cataracts Neg Hx     Glaucoma Neg Hx     Macular degeneration Neg Hx     Retinal detachment Neg Hx     Strabismus Neg Hx     Stroke Neg Hx     Thyroid disease Neg Hx        SOCIAL HISTORY:   Social History     Socioeconomic History    Marital status:      Spouse name: Not on file    Number of children: Not on file    Years of education: Not on file    Highest education level: Not on file   Occupational History     Employer: [employer]   Social Needs    Financial resource strain: Not on file    Food insecurity     Worry: Not on file     Inability: Not on file    Transportation needs     Medical: Not on file     Non-medical: Not on  file   Tobacco Use    Smoking status: Current Some Day Smoker     Packs/day: 0.50     Types: Cigarettes     Last attempt to quit: 3/3/2019     Years since quittin.2    Smokeless tobacco: Former User    Tobacco comment: a pack/week-trying to quit   Substance and Sexual Activity    Alcohol use: Yes     Comment: Social - Drinks wine     Drug use: No    Sexual activity: Yes     Partners: Male   Lifestyle    Physical activity     Days per week: Not on file     Minutes per session: Not on file    Stress: Not on file   Relationships    Social connections     Talks on phone: Not on file     Gets together: Not on file     Attends Nondenominational service: Not on file     Active member of club or organization: Not on file     Attends meetings of clubs or organizations: Not on file     Relationship status: Not on file   Other Topics Concern    Not on file   Social History Narrative    Son s/p renal transplant = > dx focal sclerosing glomerulonephritis. She lives with her . She  her first  after 17 years due to a crack problem. She has a son. He has focal segmental glomeronephritis. She is a smoker who is trying to quit. 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 Her  is employed in [employer]. She drinks alcohol occaisionally. She doesn't have a history of substance. Her  has been having affairs. She is planning to leave once she is financially able. She could be at risk for STD's.    She is walking 3 days a week.                    MEDICATIONS:     Current Outpatient Medications:     amLODIPine (NORVASC) 10 MG tablet, Take 1 tablet (10 mg total) by mouth once daily., Disp: 90 tablet, Rfl: 3    carvedilol (COREG) 6.25 MG tablet, Take 1 tablet (6.25 mg total) by mouth 2 (two) times daily., Disp: 180 tablet, Rfl: 3    COSENTYX PEN, 2 PENS, 150 mg/mL PnIj, , Disp: , Rfl:     fluticasone propionate (FLONASE) 50 mcg/actuation nasal spray, 2  "sprays (100 mcg total) by Each Nare route once daily., Disp: 16 g, Rfl: 11    meloxicam (MOBIC) 15 MG tablet, Take 1 tablet (15 mg total) by mouth once daily., Disp: 30 tablet, Rfl: 0    pantoprazole (PROTONIX) 40 MG tablet, Take 1 tablet (40 mg total) by mouth once daily., Disp: 30 tablet, Rfl: 11    traZODone (DESYREL) 50 MG tablet, TAKE 1 TABLET (50 MG TOTAL) BY MOUTH EVERY EVENING., Disp: 90 tablet, Rfl: 3    triamterene-hydrochlorothiazide 37.5-25 mg (MAXZIDE-25) 37.5-25 mg per tablet, Take 1 tablet by mouth once daily., Disp: 90 tablet, Rfl: 3    ALLERGIES:   Review of patient's allergies indicates:   Allergen Reactions    Losartan Hives, Swelling and Rash    Ciprofloxacin      visual disturbance    Lisinopril      coughing    Zithromax [azithromycin] Diarrhea        PHYSICAL EXAMINATION:  /76   Ht 5' 1" (1.549 m)   Wt 88.5 kg (195 lb)   BMI 36.84 kg/m²   Vitals signs and nursing note have been reviewed.  General: In no acute distress, well developed, well nourished, no diaphoresis  Eyes: EOM full and smooth, no eye redness or discharge  HENT: normocephalic and atraumatic, neck supple, trachea midline, no nasal discharge, no external ear redness or discharge  Cardiovascular: 2+ and symmetric DP pulses bilaterally, no LE edema  Lungs: respirations non-labored, no conversational dyspnea   Abd: non-distended, no rigidity  MSK: no amputation or deformity, no swelling of extremities  Neuro: AAOx3, CN2-12 grossly intact  Skin: No rashes, warm and dry  Psychiatric: cooperative, pleasant, mood and affect appropriate for age    MUSCULOSKELETAL EXAM:    LEFT KNEE EXAMINATION   Affected side is compared to contralateral knee     Observation:  No edema, erythema, ecchymosis, or effusion noted.  No muscle atrophy of the thighs and calves noted.  No obvious bony deformities noted.   No genu valgus/varum noted. No recurvatum noted.    No tibial internal/external torsion.      Tenderness:  Patella - " none    Lateral joint line - none  Quad tendon - none   Medial joint line - present, posterior  Patellar tendon - none   Medial plica - none  Tibial tubercle - none   Lateral plica - none  Pes anserine - none   MCL prox - none  Distal ITB - none   MCL distal - none  MFC - none    LCL prox - none  LFC - none    LCL distal - none  Tibia - none    Fibula - none  Tenderness in the popliteal fossa    No obvious bursae, plicae, popliteal cysts, or tendon derangement palpated.          ROM:   Active extension to 0° on left without hyperextension, lag, crepitus, or patellar J sign.   Active extension to 0° on right without hyperextension, lag, crepitus, or patellar J sign.   Active flexion to 135° on left and 135° on right.    Strength: (bilaterally)  Knee Flexion - 5/5 on left and 5/5 on right  Knee Extension - 5/5 on left and 5/5 on right  Hip Flexion - 5/5 on left and 5/5 on right  Hip Extension - 5/5 on left and 5/5 on right  Ankle dorsiflexion - 5/5 on left and 5/5 on right  Ankle Plantarflexion - 5/5 on left and 5/5 on right    Patellofemoral Exam:  Patellar ballottement - negative  Bulge sign - negative  Patellar grind - negative  No patellar laxity with medial and lateral translation   No apprehension with medial and lateral patellar translation.     Meniscus Testing:     Mild pain with forced flexion and terminal extension  Sarais test - negative for clicks, positive for medial pain  Bounce home test - negative    Ligament Testing:  Lachman's test - negative  No laxity with anterior drawer.  No laxity with posterior drawer.    No laxity with varus testing at 0 and 30 degrees.  No laxity with valgus testing at 0 and 30 degrees.    IT Band Testing:  Chavezs test - negative   Noble Compression test - negative    Neurovascular Examination:   Normal gait without antalgia.  Sensation intact to light touch in the obturator, lateral/intermediate/medial/posterior femoral cutaneous, saphenous, and common peroneal nerves  bilaterally.  Pulses intact at the DP and PT arteries bilaterally.    Capillary refill intact <2 seconds in all toes bilaterally.      IMAGIN. X-ray obtained 2020 due to left knee pain   2. X-ray images were reviewed personally by me and then directly with patient.  3. FINDINGS: X-ray images obtained demonstrate no cortical irregularities, sclerosis, osteophyte formation, or subchonral cysts. There is no mild medial compartment joint space narrowing.  4. IMPRESSION: As above.       ASSESSMENT:      ICD-10-CM ICD-9-CM   1. Acute pain of left knee  M25.562 719.46         PLAN:  1. Acute left knee pain -     - Rubia admits to left knee and calf pain for the past three months without known mechanism of injury.  She has had x-rays obtained and had a DVT workup and was told that both were negative. She denies any specific injury or trauma and when asked where she hurts, she points to her medial joint line and states that the pain is most intense here especially with position changes and long periods of standing.    - XRs ordered in the office today and images were personally reviewed with the patient. See above for further detail.    - After a discussion of the cause of her pain, it is most consistent with osteoarthritis. If not improved with this plain, we will start to look further at the surrounding myofascial components.    - We reviewed the natural history of osteoarthritis and a multipronged treatment approach. We reviewed the importance of addressing three different aspects to best manage this condition. Controlling the intra-articular immune reaction through medications and/or injections, improving local stability through bracing and/or injection, and improving functional stability through hip, core, and ankle strength, stability and mobility which may benefit from formal physical therapy.    - Patient fitted for hinged knee brace today. Encouraged to wear daily with activity.    - HEP for patellar  tracking prescribed today. Handout provided, explained, and exercises were demonstrated as needed. Encouraged to do daily.  [id number] HOME EXERCISE PROGRAM (HEP):  The patient was taught a homegoing physical therapy regimen as described above. The patient demonstrated understanding of the exercises and proper technique of their execution. This interaction took 15 minutes.     - Mobic 15 mg prescribed today. Encouraged to take daily for two weeks, followed by as needed.      Future planning includes - possibly OMT if indicated, physical therapy, US guided CSI    All questions were answered to the best of my ability and all concerns were addressed at this time.    Follow up in 5-6 weeks for above, or sooner if needed.      This note is dictated using the M*Modal Fluency Direct word recognition program. There are word recognition mistakes that are occasionally missed on review.

## 2020-06-15 NOTE — TELEPHONE ENCOUNTER
----- Message from Flor Harrison sent at 6/15/2020 12:25 PM CDT -----  Contact: Patient [phone]  Requesting an RX refill or new RX.  Is this a refill or new RX:  new  RX name and strength: pantoprazole (PROTONIX) 40 MG tablet  Directions (copy/paste from chart):    Is this a 30 day or 90 day RX:    Local pharmacy or mail order pharmacy:  local  Pharmacy name and phone # (copy/paste from chart):   [id number] - [place] [id number] [place] [address] AT [facility] [address] [phone] (Phone)  [phone] (Fax)    Comments:

## 2020-06-15 NOTE — LETTER
Sasha 15, 2020      Stefanie Leal MD  [address] [facility] [address]           [facility]  [address], [address]  [facility] [address]  Phone: [phone]  Fax: [phone]          Patient: Rubia Mathews   MR Number: [id number]   YOB: 1955   Date of Visit: 6/15/2020       Dear Dr. Stefanie Leal:    Thank you for referring Rubia Mathews to me for evaluation. Attached you will find relevant portions of my assessment and plan of care.    If you have questions, please do not hesitate to call me. I look forward to following Rubia Mathews along with you.    Sincerely,    Phoenix Rios, DO    Enclosure  CC:  No Recipients    If you would like to receive this communication electronically, please contact [email or web address] or [phone] to request more information on [employer] Link access.    For providers and/or their staff who would like to refer a patient to [facility], please contact us through our one-stop-shop provider referral line, [facility], at [phone].    If you feel you have received this communication in error or would no longer like to receive these types of communications, please e-mail [email or web address]

## 2020-07-23 ENCOUNTER — TELEPHONE (OUTPATIENT)
Dept: INTERNAL MEDICINE | Facility: CLINIC | Age: 65
End: 2020-07-23

## 2020-07-23 ENCOUNTER — PATIENT OUTREACH (OUTPATIENT)
Dept: ADMINISTRATIVE | Facility: OTHER | Age: 65
End: 2020-07-23

## 2020-07-23 DIAGNOSIS — Z12.31 ENCOUNTER FOR SCREENING MAMMOGRAM FOR BREAST CANCER: Primary | ICD-10-CM

## 2020-07-23 NOTE — TELEPHONE ENCOUNTER
----- Message from Linda Yousif sent at 7/23/2020  1:38 PM CDT -----  Contact: Rubia [phone]  Would like to receive medical advice.    Would they like a call back or a response via [facility]:  Call back    Additional information:  Calling to request orders for yearly mammogram.

## 2020-07-23 NOTE — PROGRESS NOTES
Requested updates within Care Everywhere.  Patient's chart was reviewed for overdue MIKE topics.  Immunizations reconciled.    Orders placed:  Tasked appts:  Labs Linked:

## 2020-08-05 ENCOUNTER — HOSPITAL ENCOUNTER (OUTPATIENT)
Dept: RADIOLOGY | Facility: HOSPITAL | Age: 65
Discharge: HOME | End: 2020-08-05
Attending: INTERNAL MEDICINE
Payer: MEDICAID

## 2020-08-05 DIAGNOSIS — Z12.31 ENCOUNTER FOR SCREENING MAMMOGRAM FOR BREAST CANCER: ICD-10-CM

## 2020-08-05 PROCEDURE — 77067 SCR MAMMO BI INCL CAD: CPT | Mod: TC

## 2020-08-05 PROCEDURE — 77063 MAMMO DIGITAL SCREENING BILAT WITH TOMOSYNTHESIS_CAD: ICD-10-PCS | Mod: 26,,, | Performed by: RADIOLOGY

## 2020-08-05 PROCEDURE — 77067 MAMMO DIGITAL SCREENING BILAT WITH TOMOSYNTHESIS_CAD: ICD-10-PCS | Mod: 26,,, | Performed by: RADIOLOGY

## 2020-08-05 PROCEDURE — 77063 BREAST TOMOSYNTHESIS BI: CPT | Mod: 26,,, | Performed by: RADIOLOGY

## 2020-08-05 PROCEDURE — 77067 SCR MAMMO BI INCL CAD: CPT | Mod: 26,,, | Performed by: RADIOLOGY

## 2020-08-06 ENCOUNTER — TELEPHONE (OUTPATIENT)
Dept: INTERNAL MEDICINE | Facility: CLINIC | Age: 65
End: 2020-08-06

## 2020-08-06 NOTE — TELEPHONE ENCOUNTER
----- Message from Stefanie Leal MD sent at 8/6/2020  1:37 PM CDT -----  Please note that your mammogram was read as follows  Impression:  There is no mammographic evidence of malignancy.  Stefanie Penny

## 2020-10-05 ENCOUNTER — PATIENT MESSAGE (OUTPATIENT)
Dept: ADMINISTRATIVE | Facility: HOSPITAL | Age: 65
End: 2020-10-05

## 2020-11-12 ENCOUNTER — PATIENT OUTREACH (OUTPATIENT)
Dept: ADMINISTRATIVE | Facility: HOSPITAL | Age: 65
End: 2020-11-12

## 2020-11-12 DIAGNOSIS — Z12.11 COLON CANCER SCREENING: Primary | ICD-10-CM

## 2020-11-12 NOTE — PROGRESS NOTES
Chart reviewed  Spoke with pt and she states that she did not revieve a fit kit. I informed the pt that I will be sending out another fit kit in the mail.  FitKit was given to patient on 11/12/2020 2:36 PM

## 2020-12-03 ENCOUNTER — LAB VISIT (OUTPATIENT)
Dept: LAB | Facility: HOSPITAL | Age: 65
End: 2020-12-03
Attending: INTERNAL MEDICINE
Payer: MEDICAID

## 2020-12-03 DIAGNOSIS — Z12.11 COLON CANCER SCREENING: ICD-10-CM

## 2020-12-03 PROCEDURE — 82274 ASSAY TEST FOR BLOOD FECAL: CPT

## 2020-12-07 LAB — HEMOCCULT STL QL IA: POSITIVE

## 2020-12-08 ENCOUNTER — TELEPHONE (OUTPATIENT)
Dept: INTERNAL MEDICINE | Facility: CLINIC | Age: 65
End: 2020-12-08

## 2020-12-08 DIAGNOSIS — R19.5 HEME + STOOL: Primary | ICD-10-CM

## 2020-12-08 NOTE — TELEPHONE ENCOUNTER
----- Message from Myranda Abbott sent at 12/8/2020 10:31 AM CST -----  Contact: [phone] @ Patient  Calling to get test results.  Name of test (lab, x-ray):  Fecal Immunochemical Test (iFOBT) [[id number]]  Date of test: 12/3/2020  Where was the test performed: [facility], [facility]  Comments: Patient would like to discuss results does not understand

## 2020-12-09 NOTE — TELEPHONE ENCOUNTER
----- Message from Jose Adams sent at 12/9/2020 11:34 AM CST -----  Good Morning,      is stating she still doesn't understand her results from her fit kit that was done on the 3rd of December. Please advise

## 2020-12-09 NOTE — TELEPHONE ENCOUNTER
Spoke to pt. Pt informed of results and recommendation.  Pt ok with this.  Referral sent to referral coordinator for scheduling.

## 2020-12-11 ENCOUNTER — PATIENT MESSAGE (OUTPATIENT)
Dept: OTHER | Facility: OTHER | Age: 65
End: 2020-12-11

## 2020-12-17 ENCOUNTER — PATIENT OUTREACH (OUTPATIENT)
Dept: ADMINISTRATIVE | Facility: OTHER | Age: 65
End: 2020-12-17

## 2020-12-17 NOTE — PROGRESS NOTES
Updates were requested from care everywhere.  Chart was reviewed for overdue Proactive [facility] Encounters (MIKE) topics (CRS, Breast Cancer Screening, Eye exam)  Health Maintenance has been updated.  LINKS not responding.

## 2020-12-30 ENCOUNTER — CLINICAL SUPPORT (OUTPATIENT)
Dept: URGENT CARE | Facility: CLINIC | Age: 65
End: 2020-12-30
Payer: MEDICAID

## 2020-12-30 VITALS — TEMPERATURE: 98 F

## 2020-12-30 DIAGNOSIS — U07.1 COVID-19 VIRUS DETECTED: ICD-10-CM

## 2020-12-30 DIAGNOSIS — U07.1 COVID-19: Primary | ICD-10-CM

## 2020-12-30 LAB
CTP QC/QA: YES
SARS-COV-2 RDRP RESP QL NAA+PROBE: POSITIVE

## 2020-12-30 PROCEDURE — U0002 COVID-19 LAB TEST NON-CDC: HCPCS | Mod: QW,S$GLB,, | Performed by: PHYSICIAN ASSISTANT

## 2020-12-30 PROCEDURE — U0002: ICD-10-PCS | Mod: QW,S$GLB,, | Performed by: PHYSICIAN ASSISTANT

## 2021-01-04 RX ORDER — TRAZODONE HYDROCHLORIDE 50 MG/1
50 TABLET ORAL NIGHTLY
Qty: 90 TABLET | Refills: 3 | Status: SHIPPED | OUTPATIENT
Start: 2021-01-04 | End: 2021-07-02 | Stop reason: ALTCHOICE

## 2021-04-19 NOTE — TELEPHONE ENCOUNTER
Ongoing SW/CM Assessment/Plan of Care Note     See SW/CM flowsheets for goals and other objective data.    Patient/Family discharge goal (s):  Lives w/spouse     Disposition:  Currently in the [facility]  Initially admitted 4/5, s/p fall off garage roof, sustained ICH, SDH, L1 compression fx-TLSO brace     Medicaid pending  No PCP    Progress note:   Pt who was transferred to the [facility] on 4/14 s/p cardiac arrest, remains intubated, ventilated at 30% FiO2, PEEP 5, attempting weaning trials.     RLE DVT, on lovenox.    Zosyn and vancomycin IV          ----- Message from Kamilah Moffett sent at 5/2/2019 10:21 AM CDT -----  Contact: Pt self Mobile/Home [phone]   Caller is requesting to schedule their annual screening mammogram appointment. Order is not listed in Epic.  Please enter order and contact patient to schedule.  Where would they like the mammogram performed?:  @ [facility] location  Would the patient rather receive a phone call back or a response via [facility]?:  A phone call please.   Additional information:

## 2021-04-26 ENCOUNTER — TELEPHONE (OUTPATIENT)
Dept: INTERNAL MEDICINE | Facility: CLINIC | Age: 66
End: 2021-04-26

## 2021-04-27 ENCOUNTER — OFFICE VISIT (OUTPATIENT)
Dept: INTERNAL MEDICINE | Facility: CLINIC | Age: 66
End: 2021-04-27
Payer: MEDICAID

## 2021-04-27 VITALS
HEIGHT: 61 IN | OXYGEN SATURATION: 98 % | HEART RATE: 72 BPM | SYSTOLIC BLOOD PRESSURE: 124 MMHG | DIASTOLIC BLOOD PRESSURE: 72 MMHG | BODY MASS INDEX: 34.21 KG/M2 | TEMPERATURE: 98 F | RESPIRATION RATE: 16 BRPM | WEIGHT: 181.19 LBS

## 2021-04-27 DIAGNOSIS — R73.9 HYPERGLYCEMIA: ICD-10-CM

## 2021-04-27 DIAGNOSIS — E78.5 HYPERLIPIDEMIA, UNSPECIFIED HYPERLIPIDEMIA TYPE: ICD-10-CM

## 2021-04-27 DIAGNOSIS — R55 VASOVAGAL SYNCOPE: ICD-10-CM

## 2021-04-27 DIAGNOSIS — Z00.00 ANNUAL PHYSICAL EXAM: Primary | ICD-10-CM

## 2021-04-27 DIAGNOSIS — I10 ESSENTIAL HYPERTENSION: ICD-10-CM

## 2021-04-27 PROCEDURE — 99999 PR PBB SHADOW E&M-EST. PATIENT-LVL IV: ICD-10-PCS | Mod: PBBFAC,,, | Performed by: INTERNAL MEDICINE

## 2021-04-27 PROCEDURE — 99396 PR PREVENTIVE VISIT,EST,40-64: ICD-10-PCS | Mod: S$PBB,,, | Performed by: INTERNAL MEDICINE

## 2021-04-27 PROCEDURE — 99999 PR PBB SHADOW E&M-EST. PATIENT-LVL IV: CPT | Mod: PBBFAC,,, | Performed by: INTERNAL MEDICINE

## 2021-04-27 PROCEDURE — 93005 ELECTROCARDIOGRAM TRACING: CPT | Mod: PBBFAC,PO | Performed by: INTERNAL MEDICINE

## 2021-04-27 PROCEDURE — 99396 PREV VISIT EST AGE 40-64: CPT | Mod: S$PBB,,, | Performed by: INTERNAL MEDICINE

## 2021-04-27 PROCEDURE — 93010 ELECTROCARDIOGRAM REPORT: CPT | Mod: S$PBB,,, | Performed by: INTERNAL MEDICINE

## 2021-04-27 PROCEDURE — 93010 EKG 12-LEAD: ICD-10-PCS | Mod: S$PBB,,, | Performed by: INTERNAL MEDICINE

## 2021-04-27 PROCEDURE — 99214 OFFICE O/P EST MOD 30 MIN: CPT | Mod: PBBFAC,PO | Performed by: INTERNAL MEDICINE

## 2021-04-27 RX ORDER — GUSELKUMAB 100 MG/ML
INJECTION SUBCUTANEOUS
COMMUNITY
Start: 2021-04-12

## 2021-04-27 RX ORDER — NYSTATIN 100000 U/G
CREAM TOPICAL 2 TIMES DAILY
COMMUNITY
Start: 2021-02-10

## 2021-04-29 ENCOUNTER — TELEPHONE (OUTPATIENT)
Dept: INTERNAL MEDICINE | Facility: CLINIC | Age: 66
End: 2021-04-29

## 2021-04-29 DIAGNOSIS — D50.8 OTHER IRON DEFICIENCY ANEMIA: Primary | ICD-10-CM

## 2021-05-03 ENCOUNTER — TELEPHONE (OUTPATIENT)
Dept: INTERNAL MEDICINE | Facility: CLINIC | Age: 66
End: 2021-05-03

## 2021-05-05 ENCOUNTER — PATIENT OUTREACH (OUTPATIENT)
Dept: ADMINISTRATIVE | Facility: OTHER | Age: 66
End: 2021-05-05

## 2021-05-10 ENCOUNTER — OFFICE VISIT (OUTPATIENT)
Dept: GASTROENTEROLOGY | Facility: CLINIC | Age: 66
End: 2021-05-10
Payer: MEDICAID

## 2021-05-10 VITALS
HEIGHT: 61 IN | DIASTOLIC BLOOD PRESSURE: 60 MMHG | RESPIRATION RATE: 16 BRPM | OXYGEN SATURATION: 99 % | SYSTOLIC BLOOD PRESSURE: 130 MMHG | WEIGHT: 186.13 LBS | HEART RATE: 73 BPM | BODY MASS INDEX: 35.14 KG/M2

## 2021-05-10 DIAGNOSIS — D50.9 IRON DEFICIENCY ANEMIA, UNSPECIFIED IRON DEFICIENCY ANEMIA TYPE: Primary | ICD-10-CM

## 2021-05-10 DIAGNOSIS — R19.5 POSITIVE FIT (FECAL IMMUNOCHEMICAL TEST): ICD-10-CM

## 2021-05-10 PROCEDURE — 99203 PR OFFICE/OUTPT VISIT, NEW, LEVL III, 30-44 MIN: ICD-10-PCS | Mod: S$PBB,,, | Performed by: NURSE PRACTITIONER

## 2021-05-10 PROCEDURE — 99999 PR PBB SHADOW E&M-EST. PATIENT-LVL V: ICD-10-PCS | Mod: PBBFAC,,, | Performed by: NURSE PRACTITIONER

## 2021-05-10 PROCEDURE — 99203 OFFICE O/P NEW LOW 30 MIN: CPT | Mod: S$PBB,,, | Performed by: NURSE PRACTITIONER

## 2021-05-10 PROCEDURE — 99215 OFFICE O/P EST HI 40 MIN: CPT | Mod: PBBFAC,PO | Performed by: NURSE PRACTITIONER

## 2021-05-10 PROCEDURE — 99999 PR PBB SHADOW E&M-EST. PATIENT-LVL V: CPT | Mod: PBBFAC,,, | Performed by: NURSE PRACTITIONER

## 2021-05-10 RX ORDER — APREMILAST 30 MG/1
1 TABLET, FILM COATED ORAL 2 TIMES DAILY
COMMUNITY
Start: 2021-02-11 | End: 2021-06-01 | Stop reason: ALTCHOICE

## 2021-05-10 RX ORDER — SODIUM, POTASSIUM,MAG SULFATES 17.5-3.13G
1 SOLUTION, RECONSTITUTED, ORAL ORAL DAILY
Qty: 1 KIT | Refills: 0 | Status: SHIPPED | OUTPATIENT
Start: 2021-05-10 | End: 2021-05-12

## 2021-06-09 ENCOUNTER — TELEPHONE (OUTPATIENT)
Dept: GASTROENTEROLOGY | Facility: CLINIC | Age: 66
End: 2021-06-09

## 2021-06-15 DIAGNOSIS — K21.9 GASTROESOPHAGEAL REFLUX DISEASE: ICD-10-CM

## 2021-06-15 DIAGNOSIS — K21.00 GASTROESOPHAGEAL REFLUX DISEASE WITH ESOPHAGITIS WITHOUT HEMORRHAGE: Primary | ICD-10-CM

## 2021-06-15 RX ORDER — PANTOPRAZOLE SODIUM 40 MG/1
40 TABLET, DELAYED RELEASE ORAL DAILY
Qty: 30 TABLET | Refills: 11 | Status: SHIPPED | OUTPATIENT
Start: 2021-06-15 | End: 2022-07-13 | Stop reason: SDUPTHER

## 2021-06-16 ENCOUNTER — TELEPHONE (OUTPATIENT)
Dept: GASTROENTEROLOGY | Facility: CLINIC | Age: 66
End: 2021-06-16

## 2021-07-16 ENCOUNTER — TELEPHONE (OUTPATIENT)
Dept: GASTROENTEROLOGY | Facility: CLINIC | Age: 66
End: 2021-07-16

## 2021-07-28 ENCOUNTER — OFFICE VISIT (OUTPATIENT)
Dept: HEMATOLOGY/ONCOLOGY | Facility: CLINIC | Age: 66
End: 2021-07-28
Payer: MEDICAID

## 2021-07-28 VITALS
SYSTOLIC BLOOD PRESSURE: 128 MMHG | HEIGHT: 61 IN | DIASTOLIC BLOOD PRESSURE: 78 MMHG | HEART RATE: 71 BPM | BODY MASS INDEX: 34.55 KG/M2 | OXYGEN SATURATION: 96 % | WEIGHT: 183 LBS

## 2021-07-28 DIAGNOSIS — I10 ESSENTIAL HYPERTENSION: ICD-10-CM

## 2021-07-28 DIAGNOSIS — D50.9 IRON DEFICIENCY ANEMIA: ICD-10-CM

## 2021-07-28 DIAGNOSIS — D50.8 IRON DEFICIENCY ANEMIA SECONDARY TO INADEQUATE DIETARY IRON INTAKE: Primary | ICD-10-CM

## 2021-07-28 DIAGNOSIS — E53.8 VITAMIN B 12 DEFICIENCY: ICD-10-CM

## 2021-07-28 DIAGNOSIS — K29.30 CHRONIC SUPERFICIAL GASTRITIS WITHOUT BLEEDING: ICD-10-CM

## 2021-07-28 PROCEDURE — 99214 OFFICE O/P EST MOD 30 MIN: CPT | Mod: S$PBB,,, | Performed by: INTERNAL MEDICINE

## 2021-07-28 PROCEDURE — 99213 OFFICE O/P EST LOW 20 MIN: CPT | Mod: PBBFAC,PN | Performed by: INTERNAL MEDICINE

## 2021-07-28 PROCEDURE — 99999 PR PBB SHADOW E&M-EST. PATIENT-LVL III: ICD-10-PCS | Mod: PBBFAC,,, | Performed by: INTERNAL MEDICINE

## 2021-07-28 PROCEDURE — 99214 PR OFFICE/OUTPT VISIT, EST, LEVL IV, 30-39 MIN: ICD-10-PCS | Mod: S$PBB,,, | Performed by: INTERNAL MEDICINE

## 2021-07-28 PROCEDURE — 99999 PR PBB SHADOW E&M-EST. PATIENT-LVL III: CPT | Mod: PBBFAC,,, | Performed by: INTERNAL MEDICINE

## 2021-09-29 ENCOUNTER — PATIENT MESSAGE (OUTPATIENT)
Dept: INTERNAL MEDICINE | Facility: CLINIC | Age: 66
End: 2021-09-29

## 2021-09-29 DIAGNOSIS — Z12.31 VISIT FOR SCREENING MAMMOGRAM: Primary | ICD-10-CM

## 2021-09-30 ENCOUNTER — PATIENT MESSAGE (OUTPATIENT)
Dept: INTERNAL MEDICINE | Facility: CLINIC | Age: 66
End: 2021-09-30

## 2021-10-04 ENCOUNTER — PATIENT MESSAGE (OUTPATIENT)
Dept: ADMINISTRATIVE | Facility: HOSPITAL | Age: 66
End: 2021-10-04

## 2021-10-24 RX ORDER — AMLODIPINE BESYLATE 10 MG/1
TABLET ORAL
Qty: 90 TABLET | Refills: 3 | Status: SHIPPED | OUTPATIENT
Start: 2021-10-24 | End: 2022-07-13 | Stop reason: SDUPTHER

## 2021-10-24 RX ORDER — CARVEDILOL 6.25 MG/1
TABLET ORAL
Qty: 180 TABLET | Refills: 3 | Status: SHIPPED | OUTPATIENT
Start: 2021-10-24 | End: 2022-07-13 | Stop reason: SDUPTHER

## 2021-11-08 RX ORDER — TRIAMTERENE/HYDROCHLOROTHIAZID 37.5-25 MG
TABLET ORAL
Qty: 90 TABLET | Refills: 3 | Status: SHIPPED | OUTPATIENT
Start: 2021-11-08 | End: 2022-07-13 | Stop reason: SDUPTHER

## 2021-12-14 ENCOUNTER — TELEPHONE (OUTPATIENT)
Dept: INTERNAL MEDICINE | Facility: CLINIC | Age: 66
End: 2021-12-14
Payer: MEDICAID

## 2021-12-20 ENCOUNTER — OFFICE VISIT (OUTPATIENT)
Dept: INTERNAL MEDICINE | Facility: CLINIC | Age: 66
End: 2021-12-20
Payer: MEDICAID

## 2021-12-20 ENCOUNTER — TELEPHONE (OUTPATIENT)
Dept: INTERNAL MEDICINE | Facility: CLINIC | Age: 66
End: 2021-12-20
Payer: MEDICAID

## 2021-12-20 VITALS
WEIGHT: 187 LBS | HEART RATE: 67 BPM | DIASTOLIC BLOOD PRESSURE: 72 MMHG | OXYGEN SATURATION: 98 % | BODY MASS INDEX: 35.3 KG/M2 | SYSTOLIC BLOOD PRESSURE: 144 MMHG | HEIGHT: 61 IN

## 2021-12-20 DIAGNOSIS — J45.20 MILD INTERMITTENT ASTHMATIC BRONCHITIS WITHOUT COMPLICATION: Primary | ICD-10-CM

## 2021-12-20 DIAGNOSIS — M75.41 IMPINGEMENT SYNDROME OF RIGHT SHOULDER: ICD-10-CM

## 2021-12-20 DIAGNOSIS — F17.200 LIGHT TOBACCO SMOKER: ICD-10-CM

## 2021-12-20 PROCEDURE — 99214 PR OFFICE/OUTPT VISIT, EST, LEVL IV, 30-39 MIN: ICD-10-PCS | Mod: S$PBB,,, | Performed by: INTERNAL MEDICINE

## 2021-12-20 PROCEDURE — 99214 OFFICE O/P EST MOD 30 MIN: CPT | Mod: PBBFAC | Performed by: INTERNAL MEDICINE

## 2021-12-20 PROCEDURE — 99999 PR PBB SHADOW E&M-EST. PATIENT-LVL IV: ICD-10-PCS | Mod: PBBFAC,,, | Performed by: INTERNAL MEDICINE

## 2021-12-20 PROCEDURE — 99999 PR PBB SHADOW E&M-EST. PATIENT-LVL IV: CPT | Mod: PBBFAC,,, | Performed by: INTERNAL MEDICINE

## 2021-12-20 PROCEDURE — 99214 OFFICE O/P EST MOD 30 MIN: CPT | Mod: S$PBB,,, | Performed by: INTERNAL MEDICINE

## 2021-12-20 RX ORDER — TRAMADOL HYDROCHLORIDE 50 MG/1
50 TABLET ORAL EVERY 8 HOURS PRN
Qty: 21 EACH | Refills: 0 | Status: SHIPPED | OUTPATIENT
Start: 2021-12-20 | End: 2023-01-24

## 2021-12-20 RX ORDER — DOXYCYCLINE 100 MG/1
100 CAPSULE ORAL 2 TIMES DAILY
Qty: 20 CAPSULE | Refills: 0 | Status: SHIPPED | OUTPATIENT
Start: 2021-12-20 | End: 2022-05-30 | Stop reason: ALTCHOICE

## 2021-12-20 RX ORDER — ALBUTEROL SULFATE 90 UG/1
2 AEROSOL, METERED RESPIRATORY (INHALATION) EVERY 6 HOURS PRN
Qty: 18 G | Refills: 0 | Status: SHIPPED | OUTPATIENT
Start: 2021-12-20 | End: 2022-01-16

## 2022-01-05 ENCOUNTER — TELEPHONE (OUTPATIENT)
Dept: INTERNAL MEDICINE | Facility: CLINIC | Age: 67
End: 2022-01-05
Payer: MEDICAID

## 2022-01-05 DIAGNOSIS — Z20.822 ENCOUNTER FOR LABORATORY TESTING FOR COVID-19 VIRUS: ICD-10-CM

## 2022-01-05 NOTE — TELEPHONE ENCOUNTER
----- Message from Neeta Lay sent at 1/5/2022 11:56 AM CST -----  Contact: self [phone]  Patient is requesting orders for covid testing, she states that she is going on a cruise and needs pcr test done. She was advised of all testing options. Please advise

## 2022-01-05 NOTE — TELEPHONE ENCOUNTER
Spoke to pt. Pt denies any covid symptoms. She stated that she has no fever, cough, SOB, sore throat, congestion, etc.  Order placed for PCR.  Pt stated that she will call later to schedule herself, because it has to be done on 1/26/22.

## 2022-01-11 DIAGNOSIS — J45.20 MILD INTERMITTENT ASTHMATIC BRONCHITIS WITHOUT COMPLICATION: ICD-10-CM

## 2022-01-16 RX ORDER — ALBUTEROL SULFATE 90 UG/1
AEROSOL, METERED RESPIRATORY (INHALATION)
Qty: 18 G | Refills: 0 | Status: SHIPPED | OUTPATIENT
Start: 2022-01-16 | End: 2022-02-09

## 2022-01-20 RX ORDER — TRAZODONE HYDROCHLORIDE 50 MG/1
TABLET ORAL
Qty: 90 TABLET | Refills: 3 | Status: SHIPPED | OUTPATIENT
Start: 2022-01-20 | End: 2023-01-24

## 2022-02-17 ENCOUNTER — TELEPHONE (OUTPATIENT)
Dept: INTERNAL MEDICINE | Facility: CLINIC | Age: 67
End: 2022-02-17
Payer: MEDICAID

## 2022-02-17 NOTE — TELEPHONE ENCOUNTER
----- Message from Ghada Yousif sent at 2/17/2022  2:15 PM CST -----  Contact: Virgen [phone]  Virgen flores from [facility] requesting a call back on a fax that was sent over two days ago.    Please call and advise

## 2022-02-18 NOTE — TELEPHONE ENCOUNTER
Spoke to Sheila in [facility] pharmacy. She stated that the do not have a Virgen on staff there. She stated that they have nothing on file that would require a call back on this pt.

## 2022-03-05 DIAGNOSIS — J45.20 MILD INTERMITTENT ASTHMATIC BRONCHITIS WITHOUT COMPLICATION: ICD-10-CM

## 2022-03-05 RX ORDER — ALBUTEROL SULFATE 90 UG/1
AEROSOL, METERED RESPIRATORY (INHALATION)
Qty: 18 G | Refills: 3 | Status: SHIPPED | OUTPATIENT
Start: 2022-03-05

## 2022-03-08 ENCOUNTER — TELEPHONE (OUTPATIENT)
Dept: INTERNAL MEDICINE | Facility: CLINIC | Age: 67
End: 2022-03-08
Payer: MEDICAID

## 2022-03-08 NOTE — TELEPHONE ENCOUNTER
----- Message from Darni Saleh sent at 3/8/2022  2:46 PM CST -----  Contact: [phone]  Pharmacy is calling to clarify an RX.  RX name:    carvediloL (COREG) 6.25 MG tablet    What do they need to clarify:  They are wanting to know if pt should be taking this medication    Comments:        [facility] #[id number] - [place] [address] AT [facility] OF [address] & [address] [address]  Phone: [phone] Fax: [phone]

## 2022-03-08 NOTE — TELEPHONE ENCOUNTER
Spoke to pt. Pt stated that she is currently taking her carvedilol BID. Pt verified that she knows that this is he blood pressure medication. Pt stated that she does not know why they are asking if she is still taking it.    Returned call to pharmacy. I clarified that the pt is still taking this medication.

## 2022-05-02 ENCOUNTER — PATIENT OUTREACH (OUTPATIENT)
Dept: ADMINISTRATIVE | Facility: OTHER | Age: 67
End: 2022-05-02
Payer: MEDICAID

## 2022-05-02 NOTE — PROGRESS NOTES
Health Maintenance Due   Topic Date Due    Shingles Vaccine (1 of 2) Never done    COVID-19 Vaccine (3 - Booster) 05/01/2022     Updates were requested from care everywhere.  Chart was reviewed for overdue Proactive [facility] Encounters (MIKE) topics (CRS, Breast Cancer Screening, Eye exam)  Health Maintenance has been updated.  LINKS immunization registry triggered.  Immunizations were reconciled.

## 2022-05-03 ENCOUNTER — OFFICE VISIT (OUTPATIENT)
Dept: OBSTETRICS AND GYNECOLOGY | Facility: CLINIC | Age: 67
End: 2022-05-03
Payer: MEDICAID

## 2022-05-03 VITALS
WEIGHT: 182.13 LBS | HEART RATE: 82 BPM | OXYGEN SATURATION: 100 % | HEIGHT: 61 IN | SYSTOLIC BLOOD PRESSURE: 116 MMHG | RESPIRATION RATE: 12 BRPM | BODY MASS INDEX: 34.39 KG/M2 | DIASTOLIC BLOOD PRESSURE: 72 MMHG

## 2022-05-03 DIAGNOSIS — R10.2 VAGINAL PAIN: Primary | ICD-10-CM

## 2022-05-03 PROCEDURE — 3074F SYST BP LT 130 MM HG: CPT | Mod: CPTII,,, | Performed by: STUDENT IN AN ORGANIZED HEALTH CARE EDUCATION/TRAINING PROGRAM

## 2022-05-03 PROCEDURE — 1160F PR REVIEW ALL MEDS BY PRESCRIBER/CLIN PHARMACIST DOCUMENTED: ICD-10-PCS | Mod: CPTII,,, | Performed by: STUDENT IN AN ORGANIZED HEALTH CARE EDUCATION/TRAINING PROGRAM

## 2022-05-03 PROCEDURE — 3008F BODY MASS INDEX DOCD: CPT | Mod: CPTII,,, | Performed by: STUDENT IN AN ORGANIZED HEALTH CARE EDUCATION/TRAINING PROGRAM

## 2022-05-03 PROCEDURE — 99999 PR PBB SHADOW E&M-EST. PATIENT-LVL III: ICD-10-PCS | Mod: PBBFAC,,, | Performed by: STUDENT IN AN ORGANIZED HEALTH CARE EDUCATION/TRAINING PROGRAM

## 2022-05-03 PROCEDURE — 99213 OFFICE O/P EST LOW 20 MIN: CPT | Mod: PBBFAC,PO | Performed by: STUDENT IN AN ORGANIZED HEALTH CARE EDUCATION/TRAINING PROGRAM

## 2022-05-03 PROCEDURE — 3008F PR BODY MASS INDEX (BMI) DOCUMENTED: ICD-10-PCS | Mod: CPTII,,, | Performed by: STUDENT IN AN ORGANIZED HEALTH CARE EDUCATION/TRAINING PROGRAM

## 2022-05-03 PROCEDURE — 3078F DIAST BP <80 MM HG: CPT | Mod: CPTII,,, | Performed by: STUDENT IN AN ORGANIZED HEALTH CARE EDUCATION/TRAINING PROGRAM

## 2022-05-03 PROCEDURE — 99999 PR PBB SHADOW E&M-EST. PATIENT-LVL III: CPT | Mod: PBBFAC,,, | Performed by: STUDENT IN AN ORGANIZED HEALTH CARE EDUCATION/TRAINING PROGRAM

## 2022-05-03 PROCEDURE — 3078F PR MOST RECENT DIASTOLIC BLOOD PRESSURE < 80 MM HG: ICD-10-PCS | Mod: CPTII,,, | Performed by: STUDENT IN AN ORGANIZED HEALTH CARE EDUCATION/TRAINING PROGRAM

## 2022-05-03 PROCEDURE — 99202 PR OFFICE/OUTPT VISIT, NEW, LEVL II, 15-29 MIN: ICD-10-PCS | Mod: S$PBB,,, | Performed by: STUDENT IN AN ORGANIZED HEALTH CARE EDUCATION/TRAINING PROGRAM

## 2022-05-03 PROCEDURE — 99202 OFFICE O/P NEW SF 15 MIN: CPT | Mod: S$PBB,,, | Performed by: STUDENT IN AN ORGANIZED HEALTH CARE EDUCATION/TRAINING PROGRAM

## 2022-05-03 PROCEDURE — 1159F MED LIST DOCD IN RCRD: CPT | Mod: CPTII,,, | Performed by: STUDENT IN AN ORGANIZED HEALTH CARE EDUCATION/TRAINING PROGRAM

## 2022-05-03 PROCEDURE — 1160F RVW MEDS BY RX/DR IN RCRD: CPT | Mod: CPTII,,, | Performed by: STUDENT IN AN ORGANIZED HEALTH CARE EDUCATION/TRAINING PROGRAM

## 2022-05-03 PROCEDURE — 3074F PR MOST RECENT SYSTOLIC BLOOD PRESSURE < 130 MM HG: ICD-10-PCS | Mod: CPTII,,, | Performed by: STUDENT IN AN ORGANIZED HEALTH CARE EDUCATION/TRAINING PROGRAM

## 2022-05-03 PROCEDURE — 1159F PR MEDICATION LIST DOCUMENTED IN MEDICAL RECORD: ICD-10-PCS | Mod: CPTII,,, | Performed by: STUDENT IN AN ORGANIZED HEALTH CARE EDUCATION/TRAINING PROGRAM

## 2022-05-03 NOTE — PROGRESS NOTES
Subjective:       Patient ID: Rubia Mathews is a 66 y.o. female.    Chief Complaint:  Pelvic Pain      History of Present Illness  Patient is a 67 yo  presenting due to vaginal pain. She reports that she has a sharp vaginal pain that comes ~3-4 times per month and have been present for several years. She can be doing any activity, but feels it happens more often during periods of standing.     When the pain comes, she reports that it lasts for a few seconds and then goes away. She will sometimes take tylenol for it and it goes away.         GYN & OB History  No LMP recorded (lmp unknown). Patient has had a hysterectomy.   Date of Last Pap: No result found    OB History    Para Term  AB Living   1 1 1         SAB IAB Ectopic Multiple Live Births                  # Outcome Date GA Lbr Johann/2nd Weight Sex Delivery Anes PTL Lv   1 Term                Review of Systems  Review of Systems   Constitutional: Negative for chills, fatigue, fever and unexpected weight change.   HENT: Negative for congestion, sinus pain and sore throat.    Eyes: Negative for visual disturbance.   Respiratory: Negative for cough, chest tightness and shortness of breath.    Cardiovascular: Negative for chest pain and palpitations.   Gastrointestinal: Negative for abdominal pain, constipation, diarrhea, nausea and vomiting.   Genitourinary: Positive for vaginal pain. Negative for menstrual problem, vaginal bleeding and vaginal discharge.   Musculoskeletal: Negative for myalgias.   Skin: Negative for color change, pallor and rash.   Neurological: Negative for dizziness, light-headedness and headaches.   Psychiatric/Behavioral: The patient is not nervous/anxious.            Objective:    Physical Exam:   Constitutional: She is oriented to person, place, and time. She appears well-developed and well-nourished. No distress.    HENT:   Head: Normocephalic and atraumatic.    Eyes: Pupils are equal, round, and reactive to  light. EOM are normal.     Cardiovascular: Normal rate.     Pulmonary/Chest: Effort normal. No respiratory distress.          Genitourinary:    Pelvic exam was performed with patient supine.   The external female genitalia was normal.   There is no rash, tenderness or lesion on the right labia. There is no rash, tenderness or lesion on the left labia. Vaginal cuff normal.  Cervix is absent.Uterus is absent. Urethra findings: tenderness  Bladder findings: bladder tenderness     Genitourinary Comments: Small cystocele                   Neurological: She is alert and oriented to person, place, and time.    Skin: Skin is warm and dry. No erythema.    Psychiatric: She has a normal mood and affect. Her behavior is normal. Judgment and thought content normal.          Assessment:        1. Vaginal pain                Plan:      Rubia was seen today for pelvic pain.    Diagnoses and all orders for this visit:    Vaginal pain

## 2022-05-30 ENCOUNTER — TELEPHONE (OUTPATIENT)
Dept: INTERNAL MEDICINE | Facility: CLINIC | Age: 67
End: 2022-05-30

## 2022-05-30 ENCOUNTER — OFFICE VISIT (OUTPATIENT)
Dept: INTERNAL MEDICINE | Facility: CLINIC | Age: 67
End: 2022-05-30
Payer: MEDICAID

## 2022-05-30 ENCOUNTER — TELEPHONE (OUTPATIENT)
Dept: INTERNAL MEDICINE | Facility: CLINIC | Age: 67
End: 2022-05-30
Payer: MEDICAID

## 2022-05-30 VITALS — TEMPERATURE: 98 F | OXYGEN SATURATION: 98 %

## 2022-05-30 DIAGNOSIS — D86.9 SARCOIDOSIS: ICD-10-CM

## 2022-05-30 PROCEDURE — 1159F MED LIST DOCD IN RCRD: CPT | Mod: CPTII,95,, | Performed by: INTERNAL MEDICINE

## 2022-05-30 PROCEDURE — 99213 PR OFFICE/OUTPT VISIT, EST, LEVL III, 20-29 MIN: ICD-10-PCS | Mod: 95,,, | Performed by: INTERNAL MEDICINE

## 2022-05-30 PROCEDURE — 1160F PR REVIEW ALL MEDS BY PRESCRIBER/CLIN PHARMACIST DOCUMENTED: ICD-10-PCS | Mod: CPTII,95,, | Performed by: INTERNAL MEDICINE

## 2022-05-30 PROCEDURE — 1159F PR MEDICATION LIST DOCUMENTED IN MEDICAL RECORD: ICD-10-PCS | Mod: CPTII,95,, | Performed by: INTERNAL MEDICINE

## 2022-05-30 PROCEDURE — 1160F RVW MEDS BY RX/DR IN RCRD: CPT | Mod: CPTII,95,, | Performed by: INTERNAL MEDICINE

## 2022-05-30 PROCEDURE — 99213 OFFICE O/P EST LOW 20 MIN: CPT | Mod: 95,,, | Performed by: INTERNAL MEDICINE

## 2022-05-30 NOTE — TELEPHONE ENCOUNTER
----- Message from Kamilah S Zuhair sent at 5/30/2022  1:26 PM CDT -----  Contact: Pt Mobile [phone]  Patient would like a call back in regards to her coming in to see you on today 05/30/2022 due to her having Covid. Patient said that you were suppose to send a new script to   [id number] - [place], [place] - [address] [facility] OF [address] & [facility] [address] [facility] [address], but the script was not sent in and she would like to know what is the status on the order please?

## 2022-05-30 NOTE — TELEPHONE ENCOUNTER
Spoke to pt. Pt realized that the pharmacy is closed due to the Memorial Day holiday. She stated that she will pick it up tomorrow, when they reopen.

## 2022-05-30 NOTE — PROGRESS NOTES
CC: URI      66 yo female w/ HTN, HLD, patient presents w/ URI symptoms, and tested for Covid and was positive  Sx started: 1-2 days  Fever, chills, or night sweats:  Ear pain or pressure: n/a  Sinus pain or pressure: n/a  Sore throat:+++  PND or nasal congestion:+++  Cough or wheezing: dry cough  Mucus production: n/a    GI sx: Nausea and diarrhea  Myalgias arthralgias:++  HA neuro symptoms: n/a  Son s/p kidney transplant x March w/ 4 subsequent surgeries  She is taking care of him and 5 mo grandson  Tx: MVI and Zinc and B12 and Mucinex    PE:  VSS  GEN: WDWN, A&O, conversant and co-operative, pleasant as always  EYES: Conjunctiva/lids unremarkable   RESP: Efforts unlabored      NEURO: MALDONADO,, no tremor noted  SKIN: No diaphoresis noted    IMPRESSION:  Covid 19 infection    PLAN:  Rx Paxlovid  Reviewed meds and advised of meds to avoid  Vigorous hydration - water best  Mucinex  NSAIDS   Acetaminophen  Rx  Call prn  RTC

## 2022-05-30 NOTE — TELEPHONE ENCOUNTER
----- Message from Kamilah S Zuhair sent at 5/30/2022  7:50 AM CDT -----  Contact: Pt Mobile [phone]  Patient is calling in regards to her saying that she have taken an at home Covid test this morning and she have tested positive for Covid. Patient would like for you to write her a script for the following symptoms please.     Cough, sore throat, and tiredness.     Please send script to:   [facility] #[id number] - [place], [place] - [address] AT [facility] [address]  [address]  [facility] [address]  Phone: [phone] Fax: [phone]

## 2022-07-04 NOTE — PROGRESS NOTES
68 yo female presents for Annual PE  Smoker, 1/2-1 PPwk  Social alcohol consumer, rare.                                         SCREENING TESTS:    Chol/lab, reviewed  Colonoscopy : 2021- ++ 30mm polyp- rec 3 rs recheck , Path -adenoma  Mammogram: 10/2021  WWE, YUMIKO  DEXA, pending  EYE exam, UTD  DDS exam, pending; full upper and partial lower  VACCINATIONS:   Tdap: 12/2021- new grandbaby- girl-Crystal Kunz, 12/26/2021   Covid : x 2                                                           MEDICATIONS: Reviewed                                                                                                                             REVIEW OF SYSTEMS:    No fever or chills,   No visual changes or diplopia  Appetite good, no early satiety or dysphagia  No chest pain, shortness of breath,  PND, or orthopnea.    No angina or GUY  No stroke like sx, no confusion, no focal deficits  No cough or wheezing  No Abdominal pain or change in bowels or blood in stool  No dysuria or hematuria  + left shoulder pain, s/p lifting cases water  Tx: cold compresses and Tylenol- 80% better  Back pain, chronic, intermittent  ADL's: 100% independent  AD's: consider, + will   Memory: Intact  Mental HEalth: + situational stress w/ son's renal implant and complications- had to have multiple surgeries w/p transplant  Gait: no falls  Diet: Healthy  Safety: intact    Remainder of review negative except as previously noted.                     PHYSICAL EXAM:    VSS                                          GENERAL:  She is alert and oriented, in no acute distress.    She is well      developed, well nourished, conversant and cooperative, well groomed,         pleasant female.                                                             EYES: Conjunctivae pale, but lids unremarkable.  Sclerae anicteric.    Pupils are  reactive.                NECK:  Supple.  No thyromegaly noted.                                        RESPIRATORY:  Efforts  unlabored.                                             LUNGS:  Clear to auscultation.                                               HEART:  Regular rate and rhythm.  No carotid bruits noted,   1+ pedal pulse. No edema.                                                                   ABDOMEN:  Bowel sounds present, soft, nontender.    No hepatosplenomegaly   appreciated.                                                                 MSK: Gait normal.  No clubbing, cyanosis, or edema.   Left shoulder: tender w/ decreased ROM  NEURO: MALDONADO. No tremor noted  SKIN: Warm and dry.                                                                              IMPRESSION:                                                                    Annual PE                                                                                                      HTN, stable          HLD, asx                                                                      Hyperglycemia, asx     H/O sarcoidosis    Mild, intermittent asthma     KASH  Hypokalemia  Left shoulder pain, 80% improved- caution w/ activities  GERD, caution w/ NSAIDS- reviewed GI protection     - has taken naprosyn w/o problem     Obesity w/ co-morbidity, BMI 35.02      Vitamin D deficiency- suspected                                                                                                                                                             PLAN:    Lab today  DEXA  Prevnar 20 @ local [facility]  Continue present meds  Rx update  START MicroK 8mEq- advised gi protection, especially w/ hx erythematous mucosa on last EGD   Increase iron rich foods in diet  Increase potassium rich foods  Vigorous hydration  Call prn  RTC 6 mos f/up w/ lab

## 2022-07-13 ENCOUNTER — LAB VISIT (OUTPATIENT)
Dept: LAB | Facility: HOSPITAL | Age: 67
End: 2022-07-13
Attending: INTERNAL MEDICINE
Payer: MEDICARE

## 2022-07-13 ENCOUNTER — OFFICE VISIT (OUTPATIENT)
Dept: INTERNAL MEDICINE | Facility: CLINIC | Age: 67
End: 2022-07-13
Payer: MEDICARE

## 2022-07-13 VITALS
WEIGHT: 185.44 LBS | HEART RATE: 70 BPM | HEIGHT: 61 IN | DIASTOLIC BLOOD PRESSURE: 70 MMHG | SYSTOLIC BLOOD PRESSURE: 120 MMHG | BODY MASS INDEX: 35.01 KG/M2 | OXYGEN SATURATION: 97 % | TEMPERATURE: 98 F

## 2022-07-13 DIAGNOSIS — E66.01 SEVERE OBESITY (BMI 35.0-39.9) WITH COMORBIDITY: ICD-10-CM

## 2022-07-13 DIAGNOSIS — Z78.0 POSTMENOPAUSAL ESTROGEN DEFICIENCY: ICD-10-CM

## 2022-07-13 DIAGNOSIS — E55.9 VITAMIN D DEFICIENCY: ICD-10-CM

## 2022-07-13 DIAGNOSIS — D50.8 IRON DEFICIENCY ANEMIA SECONDARY TO INADEQUATE DIETARY IRON INTAKE: ICD-10-CM

## 2022-07-13 DIAGNOSIS — K21.00 GASTROESOPHAGEAL REFLUX DISEASE WITH ESOPHAGITIS WITHOUT HEMORRHAGE: ICD-10-CM

## 2022-07-13 DIAGNOSIS — D86.9 SARCOIDOSIS: ICD-10-CM

## 2022-07-13 DIAGNOSIS — M25.512 ACUTE PAIN OF LEFT SHOULDER: ICD-10-CM

## 2022-07-13 DIAGNOSIS — R73.9 HYPERGLYCEMIA: ICD-10-CM

## 2022-07-13 DIAGNOSIS — J45.20 MILD INTERMITTENT ASTHMATIC BRONCHITIS WITHOUT COMPLICATION: ICD-10-CM

## 2022-07-13 DIAGNOSIS — I10 ESSENTIAL HYPERTENSION: ICD-10-CM

## 2022-07-13 DIAGNOSIS — E87.6 HYPOKALEMIA: ICD-10-CM

## 2022-07-13 DIAGNOSIS — Z00.00 ANNUAL PHYSICAL EXAM: Primary | ICD-10-CM

## 2022-07-13 DIAGNOSIS — E78.5 HYPERLIPIDEMIA, UNSPECIFIED HYPERLIPIDEMIA TYPE: ICD-10-CM

## 2022-07-13 LAB
25(OH)D3+25(OH)D2 SERPL-MCNC: 22 NG/ML (ref 30–96)
ANION GAP SERPL CALC-SCNC: 11 MMOL/L (ref 8–16)
BUN SERPL-MCNC: 14 MG/DL (ref 8–23)
CALCIUM SERPL-MCNC: 10.1 MG/DL (ref 8.7–10.5)
CHLORIDE SERPL-SCNC: 99 MMOL/L (ref 95–110)
CO2 SERPL-SCNC: 29 MMOL/L (ref 23–29)
CREAT SERPL-MCNC: 0.9 MG/DL (ref 0.5–1.4)
EST. GFR  (AFRICAN AMERICAN): >60 ML/MIN/1.73 M^2
EST. GFR  (NON AFRICAN AMERICAN): >60 ML/MIN/1.73 M^2
GLUCOSE SERPL-MCNC: 82 MG/DL (ref 70–110)
MAGNESIUM SERPL-MCNC: 2.1 MG/DL (ref 1.6–2.6)
POTASSIUM SERPL-SCNC: 3.6 MMOL/L (ref 3.5–5.1)
SODIUM SERPL-SCNC: 139 MMOL/L (ref 136–145)

## 2022-07-13 PROCEDURE — 1159F PR MEDICATION LIST DOCUMENTED IN MEDICAL RECORD: ICD-10-PCS | Mod: CPTII,S$GLB,, | Performed by: INTERNAL MEDICINE

## 2022-07-13 PROCEDURE — 1160F PR REVIEW ALL MEDS BY PRESCRIBER/CLIN PHARMACIST DOCUMENTED: ICD-10-PCS | Mod: CPTII,S$GLB,, | Performed by: INTERNAL MEDICINE

## 2022-07-13 PROCEDURE — 99397 PR PREVENTIVE VISIT,EST,65 & OVER: ICD-10-PCS | Mod: GZ,S$GLB,, | Performed by: INTERNAL MEDICINE

## 2022-07-13 PROCEDURE — 1160F RVW MEDS BY RX/DR IN RCRD: CPT | Mod: CPTII,S$GLB,, | Performed by: INTERNAL MEDICINE

## 2022-07-13 PROCEDURE — 3008F BODY MASS INDEX DOCD: CPT | Mod: CPTII,S$GLB,, | Performed by: INTERNAL MEDICINE

## 2022-07-13 PROCEDURE — 3008F PR BODY MASS INDEX (BMI) DOCUMENTED: ICD-10-PCS | Mod: CPTII,S$GLB,, | Performed by: INTERNAL MEDICINE

## 2022-07-13 PROCEDURE — 3078F DIAST BP <80 MM HG: CPT | Mod: CPTII,S$GLB,, | Performed by: INTERNAL MEDICINE

## 2022-07-13 PROCEDURE — 83735 ASSAY OF MAGNESIUM: CPT | Performed by: INTERNAL MEDICINE

## 2022-07-13 PROCEDURE — 36415 COLL VENOUS BLD VENIPUNCTURE: CPT | Mod: PO | Performed by: INTERNAL MEDICINE

## 2022-07-13 PROCEDURE — 99999 PR PBB SHADOW E&M-EST. PATIENT-LVL III: CPT | Mod: PBBFAC,,, | Performed by: INTERNAL MEDICINE

## 2022-07-13 PROCEDURE — 99999 PR PBB SHADOW E&M-EST. PATIENT-LVL III: ICD-10-PCS | Mod: PBBFAC,,, | Performed by: INTERNAL MEDICINE

## 2022-07-13 PROCEDURE — 82306 VITAMIN D 25 HYDROXY: CPT | Performed by: INTERNAL MEDICINE

## 2022-07-13 PROCEDURE — 3078F PR MOST RECENT DIASTOLIC BLOOD PRESSURE < 80 MM HG: ICD-10-PCS | Mod: CPTII,S$GLB,, | Performed by: INTERNAL MEDICINE

## 2022-07-13 PROCEDURE — 80048 BASIC METABOLIC PNL TOTAL CA: CPT | Performed by: INTERNAL MEDICINE

## 2022-07-13 PROCEDURE — 99397 PER PM REEVAL EST PAT 65+ YR: CPT | Mod: GZ,S$GLB,, | Performed by: INTERNAL MEDICINE

## 2022-07-13 PROCEDURE — 3288F PR FALLS RISK ASSESSMENT DOCUMENTED: ICD-10-PCS | Mod: CPTII,S$GLB,, | Performed by: INTERNAL MEDICINE

## 2022-07-13 PROCEDURE — 1159F MED LIST DOCD IN RCRD: CPT | Mod: CPTII,S$GLB,, | Performed by: INTERNAL MEDICINE

## 2022-07-13 PROCEDURE — 1101F PR PT FALLS ASSESS DOC 0-1 FALLS W/OUT INJ PAST YR: ICD-10-PCS | Mod: CPTII,S$GLB,, | Performed by: INTERNAL MEDICINE

## 2022-07-13 PROCEDURE — 3288F FALL RISK ASSESSMENT DOCD: CPT | Mod: CPTII,S$GLB,, | Performed by: INTERNAL MEDICINE

## 2022-07-13 PROCEDURE — 3074F PR MOST RECENT SYSTOLIC BLOOD PRESSURE < 130 MM HG: ICD-10-PCS | Mod: CPTII,S$GLB,, | Performed by: INTERNAL MEDICINE

## 2022-07-13 PROCEDURE — 3074F SYST BP LT 130 MM HG: CPT | Mod: CPTII,S$GLB,, | Performed by: INTERNAL MEDICINE

## 2022-07-13 PROCEDURE — 1101F PT FALLS ASSESS-DOCD LE1/YR: CPT | Mod: CPTII,S$GLB,, | Performed by: INTERNAL MEDICINE

## 2022-07-13 RX ORDER — TRIAMTERENE/HYDROCHLOROTHIAZID 37.5-25 MG
1 TABLET ORAL DAILY
Qty: 90 TABLET | Refills: 3 | Status: SHIPPED | OUTPATIENT
Start: 2022-07-13 | End: 2023-07-14

## 2022-07-13 RX ORDER — CARVEDILOL 6.25 MG/1
6.25 TABLET ORAL 2 TIMES DAILY
Qty: 180 TABLET | Refills: 3 | Status: SHIPPED | OUTPATIENT
Start: 2022-07-13 | End: 2023-07-14

## 2022-07-13 RX ORDER — PANTOPRAZOLE SODIUM 40 MG/1
40 TABLET, DELAYED RELEASE ORAL DAILY
Qty: 90 TABLET | Refills: 1 | Status: SHIPPED | OUTPATIENT
Start: 2022-07-13 | End: 2023-01-10

## 2022-07-13 RX ORDER — COVID-19 MOLECULAR TEST ASSAY
KIT MISCELLANEOUS
COMMUNITY
Start: 2022-06-03 | End: 2023-01-24

## 2022-07-13 RX ORDER — AMLODIPINE BESYLATE 10 MG/1
10 TABLET ORAL DAILY
Qty: 90 TABLET | Refills: 3 | Status: SHIPPED | OUTPATIENT
Start: 2022-07-13 | End: 2023-07-14

## 2022-07-13 RX ORDER — POTASSIUM CHLORIDE 600 MG/1
8 CAPSULE, EXTENDED RELEASE ORAL DAILY
Qty: 90 CAPSULE | Refills: 1 | Status: SHIPPED | OUTPATIENT
Start: 2022-07-13 | End: 2023-01-10

## 2022-07-13 RX ORDER — NAPROXEN 500 MG/1
500 TABLET ORAL 2 TIMES DAILY PRN
Qty: 60 TABLET | Refills: 1 | Status: SHIPPED | OUTPATIENT
Start: 2022-07-13 | End: 2022-09-11

## 2022-07-18 ENCOUNTER — TELEPHONE (OUTPATIENT)
Dept: INTERNAL MEDICINE | Facility: CLINIC | Age: 67
End: 2022-07-18
Payer: MEDICARE

## 2022-07-18 NOTE — TELEPHONE ENCOUNTER
----- Message from Stefanie eLal MD sent at 7/14/2022  1:22 PM CDT -----  Your lab has resulted and your chemistries revealed a normal ( but on the low end of the range) potassium  Please take your new potassium tablet daily.  Your vitamin D level was low, please take 2,000 IU daily of Vitamin D 3.  Please do not hesitate to call/email if you have any questions or concerns   Stefanie Penny

## 2022-07-25 ENCOUNTER — TELEPHONE (OUTPATIENT)
Dept: INTERNAL MEDICINE | Facility: CLINIC | Age: 67
End: 2022-07-25
Payer: MEDICARE

## 2022-07-25 NOTE — TELEPHONE ENCOUNTER
----- Message from Stefanie Leal MD sent at 7/25/2022  1:04 PM CDT -----  Your bone density has resulted and it is normal.  Please do not hesitate to call/email if you have any questions or concerns   Stefanie Penny

## 2023-01-22 PROBLEM — R73.03 PRE-DIABETES: Status: ACTIVE | Noted: 2023-01-22

## 2023-01-22 NOTE — PROGRESS NOTES
67 y.o. femalepresents in f/u to Pre-DM, hypertension, and dyslipidemia    Pre- DM tx w/diet and exercise  Denied increased thirst, urination, or hypoglycemia episodes  A1C ==>    Lab Results   Component Value Date    HGBA1C 5.8 (H) 04/28/2021         HTN tx w/amlodipine 10mg, carvedilol 6.25 BID, and Maxzide  Denied HA or dizziness  S/p hypokalemia  BP today==>128/68    Dyslipidemia tx w/ low fat diet and exercise   Denied unusual muscle pain or chest pain  LDL==>  Lab Results   Component Value Date    CHOL 173 04/28/2021    CHOL 205 (H) 09/14/2019    CHOL 206 (H) 03/25/2014     Lab Results   Component Value Date    HDL 50 04/28/2021    HDL 54 09/14/2019    HDL 57 03/25/2014     Lab Results   Component Value Date    LDLCALC 106.4 04/28/2021    LDLCALC 134.8 09/14/2019    LDLCALC 129.2 03/25/2014     Lab Results   Component Value Date    TRIG 83 04/28/2021    TRIG 81 09/14/2019    TRIG 99 03/25/2014     Lab Results   Component Value Date    CHOLHDL 28.9 04/28/2021    CHOLHDL 26.3 09/14/2019    CHOLHDL 27.7 03/25/2014     Vitamin D deficiency,  Level 7/2022==>22     ROS:  No fever, chills, or night sweats  No blurry vision or visual disturbance  No dysphagia or early satiety  No palpitations or tachycardia  No cough or wheezing  No GERD or abdominal pain  No numbness or focal deficits  ++ tobacco dependence- ready to quit  Remainder of review negative except as previously noted    PAST MEDICAL HX: Reviewed  PAST SURGICAL HX: Reviewed  SOCIAL HX: Reviewed  FAMILY HX: Reviewed    PHYSICAL EXAM:  VS: As noted  GENERAL: WDWN, A&O, NAD, conversant and co-operative  EYES: Conj/lids unremarkable, sclera anicteric  NECK: Supple w/o lymphadenopathy or thyromegaly  RESPIRATORY: Efforts are unlabored.   Lungs CTAP  CARDIOVASCULAR: Heart RRR. No carotid bruits noted. 1+ pedal pulses. No LE edema  GASTROINTESTINAL: BS+, soft , NT/ND, no HSM noted  MUSCULOSKELETAL: Gait normal. No CCE  NEUROLOGIC: MALDONADO. No tremor noted  SKIN: Warm  and dry    IMPRESSION:  HTN, stable ,   -continue amlodipine 10mg, and carvedilol 6.25mg  BID, and Maxzide qd  HLD, stable   Pre-DM, A1C pending  Severe obesity, working on diet  Tobacco dependence, ready to address      PLAN:  Prevnar- pt to get at [facility]   Tobacco Cessation Consult  Lab today  Continue present meds  Call prn  RTC 6 mos    30' minutes  was spent today on H/P, review of MR and MDM

## 2023-01-24 ENCOUNTER — OFFICE VISIT (OUTPATIENT)
Dept: INTERNAL MEDICINE | Facility: CLINIC | Age: 68
End: 2023-01-24
Payer: MEDICARE

## 2023-01-24 ENCOUNTER — LAB VISIT (OUTPATIENT)
Dept: LAB | Facility: HOSPITAL | Age: 68
End: 2023-01-24
Attending: INTERNAL MEDICINE
Payer: MEDICARE

## 2023-01-24 VITALS
WEIGHT: 186.31 LBS | TEMPERATURE: 97 F | BODY MASS INDEX: 35.18 KG/M2 | HEART RATE: 68 BPM | DIASTOLIC BLOOD PRESSURE: 68 MMHG | RESPIRATION RATE: 16 BRPM | SYSTOLIC BLOOD PRESSURE: 128 MMHG | HEIGHT: 61 IN | OXYGEN SATURATION: 98 %

## 2023-01-24 DIAGNOSIS — Z23 NEED FOR PNEUMOCOCCAL VACCINATION: ICD-10-CM

## 2023-01-24 DIAGNOSIS — E66.01 SEVERE OBESITY (BMI 35.0-39.9) WITH COMORBIDITY: ICD-10-CM

## 2023-01-24 DIAGNOSIS — R73.03 PRE-DIABETES: ICD-10-CM

## 2023-01-24 DIAGNOSIS — I10 ESSENTIAL HYPERTENSION: Primary | ICD-10-CM

## 2023-01-24 DIAGNOSIS — E78.5 HYPERLIPIDEMIA, UNSPECIFIED HYPERLIPIDEMIA TYPE: ICD-10-CM

## 2023-01-24 DIAGNOSIS — F17.200 TOBACCO DEPENDENCE: ICD-10-CM

## 2023-01-24 DIAGNOSIS — Z12.31 ENCOUNTER FOR SCREENING MAMMOGRAM FOR BREAST CANCER: ICD-10-CM

## 2023-01-24 LAB
ALBUMIN SERPL BCP-MCNC: 3.9 G/DL (ref 3.5–5.2)
ALP SERPL-CCNC: 107 U/L (ref 55–135)
ALT SERPL W/O P-5'-P-CCNC: 14 U/L (ref 10–44)
ANION GAP SERPL CALC-SCNC: 7 MMOL/L (ref 8–16)
AST SERPL-CCNC: 13 U/L (ref 10–40)
BILIRUB SERPL-MCNC: 0.4 MG/DL (ref 0.1–1)
BUN SERPL-MCNC: 13 MG/DL (ref 8–23)
CALCIUM SERPL-MCNC: 10.3 MG/DL (ref 8.7–10.5)
CHLORIDE SERPL-SCNC: 101 MMOL/L (ref 95–110)
CO2 SERPL-SCNC: 31 MMOL/L (ref 23–29)
CREAT SERPL-MCNC: 0.8 MG/DL (ref 0.5–1.4)
EST. GFR  (NO RACE VARIABLE): >60 ML/MIN/1.73 M^2
ESTIMATED AVG GLUCOSE: 126 MG/DL (ref 68–131)
GLUCOSE SERPL-MCNC: 101 MG/DL (ref 70–110)
HBA1C MFR BLD: 6 % (ref 4–5.6)
POTASSIUM SERPL-SCNC: 3.6 MMOL/L (ref 3.5–5.1)
PROT SERPL-MCNC: 8.6 G/DL (ref 6–8.4)
SODIUM SERPL-SCNC: 139 MMOL/L (ref 136–145)

## 2023-01-24 PROCEDURE — 80053 COMPREHEN METABOLIC PANEL: CPT | Performed by: INTERNAL MEDICINE

## 2023-01-24 PROCEDURE — 1159F MED LIST DOCD IN RCRD: CPT | Mod: CPTII,S$GLB,, | Performed by: INTERNAL MEDICINE

## 2023-01-24 PROCEDURE — 3008F PR BODY MASS INDEX (BMI) DOCUMENTED: ICD-10-PCS | Mod: CPTII,S$GLB,, | Performed by: INTERNAL MEDICINE

## 2023-01-24 PROCEDURE — 1126F AMNT PAIN NOTED NONE PRSNT: CPT | Mod: CPTII,S$GLB,, | Performed by: INTERNAL MEDICINE

## 2023-01-24 PROCEDURE — 1159F PR MEDICATION LIST DOCUMENTED IN MEDICAL RECORD: ICD-10-PCS | Mod: CPTII,S$GLB,, | Performed by: INTERNAL MEDICINE

## 2023-01-24 PROCEDURE — 99214 PR OFFICE/OUTPT VISIT, EST, LEVL IV, 30-39 MIN: ICD-10-PCS | Mod: S$GLB,,, | Performed by: INTERNAL MEDICINE

## 2023-01-24 PROCEDURE — 3288F PR FALLS RISK ASSESSMENT DOCUMENTED: ICD-10-PCS | Mod: CPTII,S$GLB,, | Performed by: INTERNAL MEDICINE

## 2023-01-24 PROCEDURE — 3008F BODY MASS INDEX DOCD: CPT | Mod: CPTII,S$GLB,, | Performed by: INTERNAL MEDICINE

## 2023-01-24 PROCEDURE — 3074F PR MOST RECENT SYSTOLIC BLOOD PRESSURE < 130 MM HG: ICD-10-PCS | Mod: CPTII,S$GLB,, | Performed by: INTERNAL MEDICINE

## 2023-01-24 PROCEDURE — 1126F PR PAIN SEVERITY QUANTIFIED, NO PAIN PRESENT: ICD-10-PCS | Mod: CPTII,S$GLB,, | Performed by: INTERNAL MEDICINE

## 2023-01-24 PROCEDURE — 3078F DIAST BP <80 MM HG: CPT | Mod: CPTII,S$GLB,, | Performed by: INTERNAL MEDICINE

## 2023-01-24 PROCEDURE — 99999 PR PBB SHADOW E&M-EST. PATIENT-LVL V: ICD-10-PCS | Mod: PBBFAC,,, | Performed by: INTERNAL MEDICINE

## 2023-01-24 PROCEDURE — 3074F SYST BP LT 130 MM HG: CPT | Mod: CPTII,S$GLB,, | Performed by: INTERNAL MEDICINE

## 2023-01-24 PROCEDURE — 1101F PR PT FALLS ASSESS DOC 0-1 FALLS W/OUT INJ PAST YR: ICD-10-PCS | Mod: CPTII,S$GLB,, | Performed by: INTERNAL MEDICINE

## 2023-01-24 PROCEDURE — 99214 OFFICE O/P EST MOD 30 MIN: CPT | Mod: S$GLB,,, | Performed by: INTERNAL MEDICINE

## 2023-01-24 PROCEDURE — 36415 COLL VENOUS BLD VENIPUNCTURE: CPT | Mod: PO | Performed by: INTERNAL MEDICINE

## 2023-01-24 PROCEDURE — 1160F RVW MEDS BY RX/DR IN RCRD: CPT | Mod: CPTII,S$GLB,, | Performed by: INTERNAL MEDICINE

## 2023-01-24 PROCEDURE — 3078F PR MOST RECENT DIASTOLIC BLOOD PRESSURE < 80 MM HG: ICD-10-PCS | Mod: CPTII,S$GLB,, | Performed by: INTERNAL MEDICINE

## 2023-01-24 PROCEDURE — 1101F PT FALLS ASSESS-DOCD LE1/YR: CPT | Mod: CPTII,S$GLB,, | Performed by: INTERNAL MEDICINE

## 2023-01-24 PROCEDURE — 1160F PR REVIEW ALL MEDS BY PRESCRIBER/CLIN PHARMACIST DOCUMENTED: ICD-10-PCS | Mod: CPTII,S$GLB,, | Performed by: INTERNAL MEDICINE

## 2023-01-24 PROCEDURE — 3288F FALL RISK ASSESSMENT DOCD: CPT | Mod: CPTII,S$GLB,, | Performed by: INTERNAL MEDICINE

## 2023-01-24 PROCEDURE — 99999 PR PBB SHADOW E&M-EST. PATIENT-LVL V: CPT | Mod: PBBFAC,,, | Performed by: INTERNAL MEDICINE

## 2023-01-24 PROCEDURE — 83036 HEMOGLOBIN GLYCOSYLATED A1C: CPT | Performed by: INTERNAL MEDICINE

## 2023-01-24 RX ORDER — VIT C/E/ZN/COPPR/LUTEIN/ZEAXAN 250MG-90MG
1000 CAPSULE ORAL DAILY
COMMUNITY

## 2023-02-06 ENCOUNTER — TELEPHONE (OUTPATIENT)
Dept: INTERNAL MEDICINE | Facility: CLINIC | Age: 68
End: 2023-02-06
Payer: MEDICARE

## 2023-02-06 NOTE — TELEPHONE ENCOUNTER
----- Message from Stefanie Leal MD sent at 2/5/2023  7:04 PM CST -----  Your lab has resulted and your chemistries are unremarkable except for a slight increase in your protein, the elevated CO2 and the low anion gap are actually balancing each other out.  Your hemoglobin A1C, remember that is a 90 day average of your blood sugar has increased slightly, please be careful with your sugar intake, and keep well hydrates.  Please do not hesitate to call/email if you have any questions or concerns   sukhwinder

## 2023-02-13 ENCOUNTER — CLINICAL SUPPORT (OUTPATIENT)
Dept: SMOKING CESSATION | Facility: CLINIC | Age: 68
End: 2023-02-13
Payer: COMMERCIAL

## 2023-02-13 DIAGNOSIS — F17.200 NICOTINE DEPENDENCE: Primary | ICD-10-CM

## 2023-02-13 PROCEDURE — 99999 PR PBB SHADOW E&M-EST. PATIENT-LVL I: ICD-10-PCS | Mod: PBBFAC,,,

## 2023-02-13 PROCEDURE — 99406 BEHAV CHNG SMOKING 3-10 MIN: CPT | Mod: S$GLB,,,

## 2023-02-13 PROCEDURE — 99999 PR PBB SHADOW E&M-EST. PATIENT-LVL I: CPT | Mod: PBBFAC,,,

## 2023-02-13 PROCEDURE — 99406 PR TOBACCO USE CESSATION INTERMEDIATE 3-10 MINUTES: ICD-10-PCS | Mod: S$GLB,,,

## 2023-02-24 ENCOUNTER — CLINICAL SUPPORT (OUTPATIENT)
Dept: SMOKING CESSATION | Facility: CLINIC | Age: 68
End: 2023-02-24

## 2023-02-24 DIAGNOSIS — F17.200 NICOTINE DEPENDENCE: Primary | ICD-10-CM

## 2023-02-24 PROCEDURE — 99404 PR PREVENT COUNSEL,INDIV,60 MIN: ICD-10-PCS | Mod: S$GLB,,,

## 2023-02-24 PROCEDURE — 99404 PREV MED CNSL INDIV APPRX 60: CPT | Mod: S$GLB,,,

## 2023-02-24 RX ORDER — BUPROPION HYDROCHLORIDE 150 MG/1
150 TABLET, EXTENDED RELEASE ORAL 2 TIMES DAILY
Qty: 60 TABLET | Refills: 0 | Status: SHIPPED | OUTPATIENT
Start: 2023-02-24 | End: 2024-02-24

## 2023-02-24 NOTE — Clinical Note
Pt seen in clinic for tobacco cessation intake #1. Pt currently smoke 1-10 cigarettes per day and will begin weekly tobacco cessation sessions. Pt agreed to take prescribed tobacco cessation medication regimen of 150 mg wellbutrin SR. Prescribed medications will be managed by physician and monitored by CTTS. Initial CO measurement=7 ppm (0-6 ppm is a non smoker)

## 2023-03-02 ENCOUNTER — CLINICAL SUPPORT (OUTPATIENT)
Dept: SMOKING CESSATION | Facility: CLINIC | Age: 68
End: 2023-03-02
Payer: COMMERCIAL

## 2023-03-02 DIAGNOSIS — F17.200 NICOTINE DEPENDENCE: Primary | ICD-10-CM

## 2023-03-02 PROCEDURE — 99404 PR PREVENT COUNSEL,INDIV,60 MIN: ICD-10-PCS | Mod: S$GLB,,,

## 2023-03-02 PROCEDURE — 99404 PREV MED CNSL INDIV APPRX 60: CPT | Mod: S$GLB,,,

## 2023-03-02 NOTE — Clinical Note
Pt seen in clinic for tobacco cessation. Pt remain tobacco free since 2/27/2023; with periodic cravings. Pt commended for the accomplishment thus far. Pt remain on prescribed tobacco cessation medication regimen of 150 mg wellbutrin SR without any negative side effects at this time. Discussed and reviewed cues, triggers, rate reduction, and delay strategies.  Pt denies any abnormal behaviors or mental changes at this time. Pt will continue with therapy sessions and medication monitoring by CTTS. Prescribed medication management will be by physician. Today's CO measurement=3 ppm (0-6 ppm is a non smoker)

## 2023-03-02 NOTE — PROGRESS NOTES
Individual Follow-Up Form    3/2/2023    Quit Date:     Clinical Status of Patient: Outpatient    Length of Service: 60 minutes    Continuing Medication: yes  Wellbutrin    Other Medications: none     Target Symptoms: Withdrawal and medication side effects. The following were  rated moderate (3) to severe (4) on TCRS:  Moderate (3): none  Severe (4): none    Comments: Pt seen in clinic for tobacco cessation. Pt remain tobacco free since 2/27/2023; with periodic cravings. Pt commended for the accomplishment thus far. Pt remain on prescribed tobacco cessation medication regimen of 150 mg wellbutrin SR without any negative side effects at this time. Discussed and reviewed cues, triggers, rate reduction, and delay strategies.  Pt denies any abnormal behaviors or mental changes at this time. Pt will continue with therapy sessions and medication monitoring by CTTS. Prescribed medication management will be by physician. Today's CO measurement=3 ppm (0-6 ppm is a non smoker)    Diagnosis: F17.200    Next Visit: 2 weeks

## 2023-03-08 ENCOUNTER — TELEPHONE (OUTPATIENT)
Dept: INTERNAL MEDICINE | Facility: CLINIC | Age: 68
End: 2023-03-08
Payer: COMMERCIAL

## 2023-03-08 NOTE — TELEPHONE ENCOUNTER
----- Message from Stefanie Leal MD sent at 3/7/2023  4:39 PM CST -----  Please note that your mammogram was read as follows  Impression:  There is no mammographic evidence of malignancy.   sukhwinder

## 2023-04-10 ENCOUNTER — CLINICAL SUPPORT (OUTPATIENT)
Dept: SMOKING CESSATION | Facility: CLINIC | Age: 68
End: 2023-04-10

## 2023-04-10 DIAGNOSIS — F17.200 NICOTINE DEPENDENCE: Primary | ICD-10-CM

## 2023-04-10 PROCEDURE — 99407 BEHAV CHNG SMOKING > 10 MIN: CPT | Mod: S$GLB,,,

## 2023-04-10 PROCEDURE — 99999 PR PBB SHADOW E&M-EST. PATIENT-LVL I: CPT | Mod: PBBFAC,,,

## 2023-04-10 PROCEDURE — 99999 PR PBB SHADOW E&M-EST. PATIENT-LVL I: ICD-10-PCS | Mod: PBBFAC,,,

## 2023-04-10 PROCEDURE — 99407 PR TOBACCO USE CESSATION INTENSIVE >10 MINUTES: ICD-10-PCS | Mod: S$GLB,,,

## 2023-05-25 ENCOUNTER — CLINICAL SUPPORT (OUTPATIENT)
Dept: SMOKING CESSATION | Facility: CLINIC | Age: 68
End: 2023-05-25

## 2023-05-25 DIAGNOSIS — F17.200 NICOTINE DEPENDENCE: Primary | ICD-10-CM

## 2023-05-25 PROCEDURE — 99407 PR TOBACCO USE CESSATION INTENSIVE >10 MINUTES: ICD-10-PCS | Mod: S$GLB,,,

## 2023-05-25 PROCEDURE — 99999 PR PBB SHADOW E&M-EST. PATIENT-LVL I: ICD-10-PCS | Mod: PBBFAC,,,

## 2023-05-25 PROCEDURE — 99407 BEHAV CHNG SMOKING > 10 MIN: CPT | Mod: S$GLB,,,

## 2023-05-25 PROCEDURE — 99999 PR PBB SHADOW E&M-EST. PATIENT-LVL I: CPT | Mod: PBBFAC,,,

## 2023-05-26 DIAGNOSIS — N81.9 FEMALE GENITAL PROLAPSE, UNSPECIFIED TYPE: Primary | ICD-10-CM

## 2023-05-30 NOTE — PROGRESS NOTES
Called pt to f/u on her 3 month smoking cessation quit status. Pt stated she remains tobacco free. Congratulated her on her hard work and success. Informed her of benefit period, phone follow ups, and contact information. Will complete smart form and will continue to follow up on quit #1 episode.

## 2023-06-01 DIAGNOSIS — N81.9 FEMALE GENITAL PROLAPSE, UNSPECIFIED: Primary | ICD-10-CM

## 2023-06-16 NOTE — PROGRESS NOTES
Left lower venous u/s complete. Paperwork, cupid and images all sent to reading physician. Time -1345.   spontaneous

## 2023-06-21 PROBLEM — R53.1 WEAKNESS: Status: ACTIVE | Noted: 2023-06-21

## 2023-06-21 PROBLEM — N81.4 PROLAPSE OF UTERUS: Status: ACTIVE | Noted: 2023-06-21

## 2023-07-14 DIAGNOSIS — K21.00 GASTROESOPHAGEAL REFLUX DISEASE WITH ESOPHAGITIS WITHOUT HEMORRHAGE: ICD-10-CM

## 2023-07-14 RX ORDER — CARVEDILOL 6.25 MG/1
TABLET ORAL
Qty: 180 TABLET | Refills: 3 | Status: SHIPPED | OUTPATIENT
Start: 2023-07-14

## 2023-07-14 RX ORDER — TRIAMTERENE/HYDROCHLOROTHIAZID 37.5-25 MG
TABLET ORAL
Qty: 90 TABLET | Refills: 3 | Status: SHIPPED | OUTPATIENT
Start: 2023-07-14

## 2023-07-14 RX ORDER — POTASSIUM CHLORIDE 600 MG/1
CAPSULE, EXTENDED RELEASE ORAL
Qty: 90 CAPSULE | Refills: 1 | Status: SHIPPED | OUTPATIENT
Start: 2023-07-14 | End: 2024-01-08

## 2023-07-14 RX ORDER — PANTOPRAZOLE SODIUM 40 MG/1
TABLET, DELAYED RELEASE ORAL
Qty: 90 TABLET | Refills: 1 | Status: SHIPPED | OUTPATIENT
Start: 2023-07-14 | End: 2024-01-08

## 2023-07-14 RX ORDER — AMLODIPINE BESYLATE 10 MG/1
TABLET ORAL
Qty: 90 TABLET | Refills: 3 | Status: SHIPPED | OUTPATIENT
Start: 2023-07-14

## 2023-07-19 ENCOUNTER — OFFICE VISIT (OUTPATIENT)
Dept: INTERNAL MEDICINE | Facility: CLINIC | Age: 68
End: 2023-07-19
Payer: MEDICARE

## 2023-07-19 VITALS
DIASTOLIC BLOOD PRESSURE: 70 MMHG | OXYGEN SATURATION: 98 % | SYSTOLIC BLOOD PRESSURE: 130 MMHG | RESPIRATION RATE: 17 BRPM | BODY MASS INDEX: 35.13 KG/M2 | HEART RATE: 64 BPM | WEIGHT: 186.06 LBS | HEIGHT: 61 IN | TEMPERATURE: 98 F

## 2023-07-19 DIAGNOSIS — M25.512 CHRONIC LEFT SHOULDER PAIN: Primary | ICD-10-CM

## 2023-07-19 DIAGNOSIS — M25.619 LIMITED RANGE OF MOTION OF SHOULDER: ICD-10-CM

## 2023-07-19 DIAGNOSIS — G89.29 CHRONIC LEFT SHOULDER PAIN: Primary | ICD-10-CM

## 2023-07-19 DIAGNOSIS — M62.838 TRAPEZIUS MUSCLE SPASM: ICD-10-CM

## 2023-07-19 PROCEDURE — 3008F BODY MASS INDEX DOCD: CPT | Mod: CPTII,S$GLB,, | Performed by: INTERNAL MEDICINE

## 2023-07-19 PROCEDURE — 3288F PR FALLS RISK ASSESSMENT DOCUMENTED: ICD-10-PCS | Mod: CPTII,S$GLB,, | Performed by: INTERNAL MEDICINE

## 2023-07-19 PROCEDURE — 1160F RVW MEDS BY RX/DR IN RCRD: CPT | Mod: CPTII,S$GLB,, | Performed by: INTERNAL MEDICINE

## 2023-07-19 PROCEDURE — 3078F DIAST BP <80 MM HG: CPT | Mod: CPTII,S$GLB,, | Performed by: INTERNAL MEDICINE

## 2023-07-19 PROCEDURE — 1101F PR PT FALLS ASSESS DOC 0-1 FALLS W/OUT INJ PAST YR: ICD-10-PCS | Mod: CPTII,S$GLB,, | Performed by: INTERNAL MEDICINE

## 2023-07-19 PROCEDURE — 3044F HG A1C LEVEL LT 7.0%: CPT | Mod: CPTII,S$GLB,, | Performed by: INTERNAL MEDICINE

## 2023-07-19 PROCEDURE — 99213 PR OFFICE/OUTPT VISIT, EST, LEVL III, 20-29 MIN: ICD-10-PCS | Mod: S$GLB,,, | Performed by: INTERNAL MEDICINE

## 2023-07-19 PROCEDURE — 99999 PR PBB SHADOW E&M-EST. PATIENT-LVL V: CPT | Mod: PBBFAC,,, | Performed by: INTERNAL MEDICINE

## 2023-07-19 PROCEDURE — 1125F AMNT PAIN NOTED PAIN PRSNT: CPT | Mod: CPTII,S$GLB,, | Performed by: INTERNAL MEDICINE

## 2023-07-19 PROCEDURE — 1159F MED LIST DOCD IN RCRD: CPT | Mod: CPTII,S$GLB,, | Performed by: INTERNAL MEDICINE

## 2023-07-19 PROCEDURE — 3008F PR BODY MASS INDEX (BMI) DOCUMENTED: ICD-10-PCS | Mod: CPTII,S$GLB,, | Performed by: INTERNAL MEDICINE

## 2023-07-19 PROCEDURE — 99999 PR PBB SHADOW E&M-EST. PATIENT-LVL V: ICD-10-PCS | Mod: PBBFAC,,, | Performed by: INTERNAL MEDICINE

## 2023-07-19 PROCEDURE — 3078F PR MOST RECENT DIASTOLIC BLOOD PRESSURE < 80 MM HG: ICD-10-PCS | Mod: CPTII,S$GLB,, | Performed by: INTERNAL MEDICINE

## 2023-07-19 PROCEDURE — 3288F FALL RISK ASSESSMENT DOCD: CPT | Mod: CPTII,S$GLB,, | Performed by: INTERNAL MEDICINE

## 2023-07-19 PROCEDURE — 1125F PR PAIN SEVERITY QUANTIFIED, PAIN PRESENT: ICD-10-PCS | Mod: CPTII,S$GLB,, | Performed by: INTERNAL MEDICINE

## 2023-07-19 PROCEDURE — 1101F PT FALLS ASSESS-DOCD LE1/YR: CPT | Mod: CPTII,S$GLB,, | Performed by: INTERNAL MEDICINE

## 2023-07-19 PROCEDURE — 99213 OFFICE O/P EST LOW 20 MIN: CPT | Mod: S$GLB,,, | Performed by: INTERNAL MEDICINE

## 2023-07-19 PROCEDURE — 3075F SYST BP GE 130 - 139MM HG: CPT | Mod: CPTII,S$GLB,, | Performed by: INTERNAL MEDICINE

## 2023-07-19 PROCEDURE — 1159F PR MEDICATION LIST DOCUMENTED IN MEDICAL RECORD: ICD-10-PCS | Mod: CPTII,S$GLB,, | Performed by: INTERNAL MEDICINE

## 2023-07-19 PROCEDURE — 3075F PR MOST RECENT SYSTOLIC BLOOD PRESS GE 130-139MM HG: ICD-10-PCS | Mod: CPTII,S$GLB,, | Performed by: INTERNAL MEDICINE

## 2023-07-19 PROCEDURE — 1160F PR REVIEW ALL MEDS BY PRESCRIBER/CLIN PHARMACIST DOCUMENTED: ICD-10-PCS | Mod: CPTII,S$GLB,, | Performed by: INTERNAL MEDICINE

## 2023-07-19 PROCEDURE — 3044F PR MOST RECENT HEMOGLOBIN A1C LEVEL <7.0%: ICD-10-PCS | Mod: CPTII,S$GLB,, | Performed by: INTERNAL MEDICINE

## 2023-07-19 RX ORDER — ESTRADIOL 0.1 MG/G
CREAM VAGINAL
COMMUNITY
Start: 2023-05-22

## 2023-07-19 RX ORDER — DICLOFENAC SODIUM 10 MG/G
2 GEL TOPICAL 3 TIMES DAILY PRN
Qty: 100 G | Refills: 0 | Status: SHIPPED | OUTPATIENT
Start: 2023-07-19

## 2023-07-19 RX ORDER — METHOCARBAMOL 750 MG/1
750 TABLET, FILM COATED ORAL 2 TIMES DAILY PRN
Qty: 20 TABLET | Refills: 0 | Status: SHIPPED | OUTPATIENT
Start: 2023-07-19 | End: 2023-07-24

## 2023-07-19 NOTE — PROGRESS NOTES
Subjective:     Rubia Mathews is a 68 y.o. female who presents for   Chief Complaint   Patient presents with    Arm Pain    Shoulder Pain       HPI    Shoulder Pain: She presents with left shoulder pain. The symptoms began about a month ago. Aggravating factors: no known event. Pain is located between the neck and shoulder and around the acromioclavicular (AC) joint. Discomfort is described as aching and sharp/stabbing. Pain was 10/10 at its worst but is 1-2/10 now. Symptoms are exacerbated by overhead movements and and reaching backwards . Evaluation to date: none. Therapy to date includes:  naproxen, tylenol heat which cause slight improvement in pain .     Review of Systems   Constitutional:  Negative for chills, diaphoresis and fever.   HENT:  Negative for congestion, ear pain, postnasal drip, sinus pressure and sore throat.    Eyes:  Negative for discharge and redness.   Respiratory:  Negative for cough and shortness of breath.    Cardiovascular:  Negative for chest pain, palpitations and leg swelling.   Gastrointestinal:  Negative for abdominal pain, constipation, diarrhea, nausea and vomiting.   Musculoskeletal:  Positive for arthralgias (right shoulder). Negative for myalgias.   Skin:  Negative for rash and wound.   Neurological:  Negative for dizziness, tremors, numbness and headaches.        Objective:     Physical Exam  Vitals reviewed.   Constitutional:       General: She is awake. She is not in acute distress.     Appearance: Normal appearance. She is well-developed and well-groomed.   HENT:      Head: Normocephalic and atraumatic.      Right Ear: Hearing and external ear normal.      Left Ear: Hearing and external ear normal.      Nose: Nose normal. No congestion.      Mouth/Throat:      Mouth: Mucous membranes are moist.   Eyes:      General: Lids are normal. Vision grossly intact.   Cardiovascular:      Rate and Rhythm: Normal rate and regular rhythm.      Heart sounds: Normal heart  sounds. No murmur heard.  Pulmonary:      Effort: Pulmonary effort is normal.      Breath sounds: Normal breath sounds. No decreased breath sounds or wheezing.   Abdominal:      General: Bowel sounds are normal. There is no distension.   Musculoskeletal:      Left shoulder: Bony tenderness (pain worse at acromioclavicular joint) present. Decreased range of motion.      Right upper arm: Tenderness (along deltoid muscles) present.      Cervical back: Tenderness (along left trapezius m.) present. No rigidity. Pain with movement and muscular tenderness present. No spinous process tenderness. Normal range of motion.      Right lower leg: No edema.      Left lower leg: No edema.   Skin:     General: Skin is warm and dry.      Findings: No lesion or rash.   Neurological:      Mental Status: She is alert and oriented to person, place, and time.   Psychiatric:         Attention and Perception: Attention normal.         Mood and Affect: Mood normal.         Behavior: Behavior is cooperative.          Assessment:      1. Chronic left shoulder pain    2. Limited range of motion of shoulder    3. Trapezius muscle spasm           Plan:     1. Chronic left shoulder pain  - stop naproxen, start Voltaren gel tid prn  - X-Ray Shoulder 2 or More Views Left; Future  - diclofenac sodium (VOLTAREN) 1 % Gel; Apply 2 g topically 3 (three) times daily as needed (shoulder pain).  Dispense: 100 g; Refill: 0  - methocarbamoL (ROBAXIN) 750 MG Tab; Take 1 tablet (750 mg total) by mouth 2 (two) times daily as needed (muscle relaxant).  Dispense: 20 tablet; Refill: 0  - Ambulatory referral/consult to Orthopedics; Future    2. Limited range of motion of shoulder  - Ambulatory referral/consult to Orthopedics; Future    3. Trapezius muscle spasm  - methocarbamoL (ROBAXIN) 750 MG Tab; Take 1 tablet (750 mg total) by mouth 2 (two) times daily as needed (muscle relaxant).  Dispense: 20 tablet; Refill: 0  - if no improvement with Robaxin, may switch to  Flexeril      Call if there is no improvement in symptoms    __________________________    Ana Laura Duong MD, PharmD  [facility]- Internal Medicine  American Board of Obesity Medicine diplomate  Office [phone]

## 2023-07-20 NOTE — PROGRESS NOTES
69 yo female presents for f/up chronic medical problems, including but not limited to HTN, HLD, Pre-DM,Asthma,  KASH, and shoulder/upper left arm pain  And risks and complications associated   And HM      HTN, stable ,  Tx:  amlodipine 10mg , carvedilol 6.25mg BID,  and Maxzide qd    Denied HA or dz  BP today==> 132/72    HLD tx low fat diet  Denied angina or SOB ( + occ asthma w/ wheezing)  LDL==> pending    Pre-DM tx: diabetic diet  Denied increased thirst or urination  A1C pending    Asthma, tx inhalers  Quiescent at this time  RR==>    KASH, tx:  Lab pending    Left shoulder pain, intermittent, chronic  In review or MR edinson go back several yrs, w/ intermittent flares, this being the worse    Smoker, 1/2-1 PPwk( quit and then resumed, didn't tolerate the Rx from [facility] clinic)  Social alcohol consumer, rare.                                         SCREENING TESTS:    Chol/lab, reviewed  Colonoscopy : 2021- ++ 30mm polyp- rec 3 rs recheck ,   Path -adenoma  Mammogram: 3/2023  WWE, TAHBSO  DEXA, 7/25/22-normal  EYE exam, UTD  DDS exam,  full upper and partial lower  VACCINATIONS:   Tdap: 12/2021- new grandbaby- girl-Crystal Meltonet, 12/26/2021   Covid : x 2                                                           MEDICATIONS: Reviewed                                                                                                                             REVIEW OF SYSTEMS:    No fever or chills,   No visual changes or diplopia  Appetite good, no early satiety or dysphagia  No chest pain, shortness of breath,  PND, or orthopnea.    No angina or GUY  No stroke like sx, no confusion, no focal deficits  No cough or wheezing  No Abdominal pain or change in bowels or blood in stool  No dysuria or hematuria  + left shoulder pain, s/p lifting cases water  Tx: cold compresses and Tylenol- 80% better  Back pain, chronic, intermittent  ADL's: 100% independent  AD's: consider, + will   Memory: Intact  Mental Health:  stable, awaiting 2nd grandchild- a boy  Gait: no recent falls  Diet: Healthy  Safety: intact    Remainder of review negative except as previously noted.                     PHYSICAL EXAM:    VSS                                          GENERAL:  She is alert and oriented, in no acute distress.    She is well      developed, well nourished, conversant and cooperative, well groomed,         pleasant female.                                                             EYES: Conjunctivae unremakable  Sclerae anicteric.    Pupils are  reactive.                NECK:  Supple.  No thyromegaly noted.                                        RESPIRATORY:  Efforts unlabored.                                             LUNGS:  Clear to auscultation.                                               HEART:  Regular rate and rhythm.  No carotid bruits noted,   1+ pedal pulse. No edema.                                                                   ABDOMEN:  Bowel sounds present, soft, nontender.    No hepatosplenomegaly   appreciated.                                                                 MSK: Gait normal.  No clubbing, cyanosis, or edema.   Left shoulder: tender w/ decreased ROM  NEURO: MALDONADO. No tremor noted  SKIN: Warm and dry.                                                                              IMPRESSION/PLAN:                                                                                                                                                                HTN, stable ,  - continue  amlodipine 10mg , carvedilol 6.25mg BID,  and Maxzide qd       HLD, stable,    -continue low fat diet  -lipid panel pending                                                               Pre-DM, stable  -continue diabetic diet  -A1C pending    Hyperglycemia, asx     H/O sarcoidosis    Mild, intermittent asthma, stable  -continue inhalers     KASH, prior w/up  -resume iron supplementation/iron rich diet  Hypokalemia,  stable  -continue potassium 8mEq qd  -CMP pending  Left arm/shoulder pain,'-s/p injection w/ Ortho, minimal improvement  -Caution meloxicam w/ GI , now taking hs, needs to take w/ food and 8+ oz liquid  -will IM Ms. Main for a copy of the shoulder exercises  Pt to  tomorrow when she goes to clinic for lab  GERD, caution w/ NSAIDS- reviewed GI protection       Obesity w/ co-morbidity, BMI 35.41  -encourage exercise/weight loss    Vitamin D deficiency  -check level  -resume supplement       HM  -fasting lab        >44'  minutes  was spent today on H/P, review of MR and MDM

## 2023-07-24 ENCOUNTER — OFFICE VISIT (OUTPATIENT)
Dept: ORTHOPEDICS | Facility: CLINIC | Age: 68
End: 2023-07-24
Payer: MEDICARE

## 2023-07-24 ENCOUNTER — TELEPHONE (OUTPATIENT)
Dept: INTERNAL MEDICINE | Facility: CLINIC | Age: 68
End: 2023-07-24
Payer: MEDICARE

## 2023-07-24 VITALS — HEIGHT: 61 IN | BODY MASS INDEX: 35.28 KG/M2 | WEIGHT: 186.88 LBS

## 2023-07-24 DIAGNOSIS — M25.512 ACUTE PAIN OF LEFT SHOULDER: Primary | ICD-10-CM

## 2023-07-24 DIAGNOSIS — M19.012 PRIMARY OSTEOARTHRITIS OF LEFT SHOULDER: ICD-10-CM

## 2023-07-24 PROCEDURE — 3044F PR MOST RECENT HEMOGLOBIN A1C LEVEL <7.0%: ICD-10-PCS | Mod: CPTII,S$GLB,, | Performed by: PHYSICIAN ASSISTANT

## 2023-07-24 PROCEDURE — 99999 PR PBB SHADOW E&M-EST. PATIENT-LVL IV: ICD-10-PCS | Mod: PBBFAC,,, | Performed by: PHYSICIAN ASSISTANT

## 2023-07-24 PROCEDURE — 20610 DRAIN/INJ JOINT/BURSA W/O US: CPT | Mod: LT,S$GLB,, | Performed by: PHYSICIAN ASSISTANT

## 2023-07-24 PROCEDURE — 1160F PR REVIEW ALL MEDS BY PRESCRIBER/CLIN PHARMACIST DOCUMENTED: ICD-10-PCS | Mod: CPTII,S$GLB,, | Performed by: PHYSICIAN ASSISTANT

## 2023-07-24 PROCEDURE — 1159F PR MEDICATION LIST DOCUMENTED IN MEDICAL RECORD: ICD-10-PCS | Mod: CPTII,S$GLB,, | Performed by: PHYSICIAN ASSISTANT

## 2023-07-24 PROCEDURE — 1126F AMNT PAIN NOTED NONE PRSNT: CPT | Mod: CPTII,S$GLB,, | Performed by: PHYSICIAN ASSISTANT

## 2023-07-24 PROCEDURE — 3288F PR FALLS RISK ASSESSMENT DOCUMENTED: ICD-10-PCS | Mod: CPTII,S$GLB,, | Performed by: PHYSICIAN ASSISTANT

## 2023-07-24 PROCEDURE — 20610 PR DRAIN/INJECT LARGE JOINT/BURSA: ICD-10-PCS | Mod: LT,S$GLB,, | Performed by: PHYSICIAN ASSISTANT

## 2023-07-24 PROCEDURE — 1126F PR PAIN SEVERITY QUANTIFIED, NO PAIN PRESENT: ICD-10-PCS | Mod: CPTII,S$GLB,, | Performed by: PHYSICIAN ASSISTANT

## 2023-07-24 PROCEDURE — 99203 OFFICE O/P NEW LOW 30 MIN: CPT | Mod: 25,S$GLB,, | Performed by: PHYSICIAN ASSISTANT

## 2023-07-24 PROCEDURE — 3008F PR BODY MASS INDEX (BMI) DOCUMENTED: ICD-10-PCS | Mod: CPTII,S$GLB,, | Performed by: PHYSICIAN ASSISTANT

## 2023-07-24 PROCEDURE — 99203 PR OFFICE/OUTPT VISIT, NEW, LEVL III, 30-44 MIN: ICD-10-PCS | Mod: 25,S$GLB,, | Performed by: PHYSICIAN ASSISTANT

## 2023-07-24 PROCEDURE — 1160F RVW MEDS BY RX/DR IN RCRD: CPT | Mod: CPTII,S$GLB,, | Performed by: PHYSICIAN ASSISTANT

## 2023-07-24 PROCEDURE — 1159F MED LIST DOCD IN RCRD: CPT | Mod: CPTII,S$GLB,, | Performed by: PHYSICIAN ASSISTANT

## 2023-07-24 PROCEDURE — 3288F FALL RISK ASSESSMENT DOCD: CPT | Mod: CPTII,S$GLB,, | Performed by: PHYSICIAN ASSISTANT

## 2023-07-24 PROCEDURE — 3044F HG A1C LEVEL LT 7.0%: CPT | Mod: CPTII,S$GLB,, | Performed by: PHYSICIAN ASSISTANT

## 2023-07-24 PROCEDURE — 99999 PR PBB SHADOW E&M-EST. PATIENT-LVL IV: CPT | Mod: PBBFAC,,, | Performed by: PHYSICIAN ASSISTANT

## 2023-07-24 PROCEDURE — 1101F PT FALLS ASSESS-DOCD LE1/YR: CPT | Mod: CPTII,S$GLB,, | Performed by: PHYSICIAN ASSISTANT

## 2023-07-24 PROCEDURE — 3008F BODY MASS INDEX DOCD: CPT | Mod: CPTII,S$GLB,, | Performed by: PHYSICIAN ASSISTANT

## 2023-07-24 PROCEDURE — 1101F PR PT FALLS ASSESS DOC 0-1 FALLS W/OUT INJ PAST YR: ICD-10-PCS | Mod: CPTII,S$GLB,, | Performed by: PHYSICIAN ASSISTANT

## 2023-07-24 RX ORDER — CYCLOBENZAPRINE HCL 5 MG
5 TABLET ORAL 2 TIMES DAILY PRN
Qty: 10 TABLET | Refills: 0 | Status: SHIPPED | OUTPATIENT
Start: 2023-07-24 | End: 2023-07-29

## 2023-07-24 RX ORDER — MELOXICAM 15 MG/1
15 TABLET ORAL DAILY
Qty: 30 TABLET | Refills: 0 | Status: SHIPPED | OUTPATIENT
Start: 2023-07-24 | End: 2023-08-23 | Stop reason: SDUPTHER

## 2023-07-24 RX ORDER — TRIAMCINOLONE ACETONIDE 40 MG/ML
40 INJECTION, SUSPENSION INTRA-ARTICULAR; INTRAMUSCULAR ONCE
Status: COMPLETED | OUTPATIENT
Start: 2023-07-24 | End: 2023-07-24

## 2023-07-24 RX ADMIN — TRIAMCINOLONE ACETONIDE 40 MG: 40 INJECTION, SUSPENSION INTRA-ARTICULAR; INTRAMUSCULAR at 04:07

## 2023-07-24 NOTE — TELEPHONE ENCOUNTER
----- Message from Rohini Whitney LPN sent at 7/24/2023 11:40 AM CDT -----  Contact: Pt [phone]  Pt was seen by Dr. Duong.  ----- Message -----  From: Wilton Duron  Sent: 7/24/2023  11:19 AM CDT  To: Elvis Cevrantes Staff    Consult    She said the methocarbamoL (ROBAXIN) 750 MG Tab is not working    Thank you

## 2023-07-24 NOTE — PROGRESS NOTES
Subjective:      Patient ID: Rubia Mathews is a 68 y.o. female.    Chief Complaint: Pain of the Left Shoulder (Patient presents today as a new patient stating she has been having left shoulder pain for about 1 month now. )    Review of patient's allergies indicates:   Allergen Reactions    Losartan Hives, Swelling and Rash    Ciprofloxacin      visual disturbance    Lisinopril      coughing    Zithromax [azithromycin] Diarrhea      69 yo RHD F presents to clinic with c/o left shoulder pain x 1 month.  No injury or trauma.  Pain to anterior and posterior shoulder, described as achy.  Radiates into upper arm at times.  Pain is worse with movement, especially reaching overhead and behind back.  Improves some with rest.  She has tried muscle relaxers, naproxen, and topical diclofenac with little relief.  No numbness/tingling.  No swelling, redness, warmth.  No neck pain or injury.      Review of Systems   Constitutional: Negative for chills, diaphoresis and fever.   HENT:  Negative for congestion, ear discharge and ear pain.    Eyes:  Negative for blurred vision, discharge, double vision and pain.   Cardiovascular:  Negative for chest pain, claudication and cyanosis.   Respiratory:  Negative for cough, hemoptysis and shortness of breath.    Endocrine: Negative for cold intolerance and heat intolerance.   Skin:  Negative for color change, dry skin, itching and rash.   Musculoskeletal:  Positive for joint pain. Negative for arthritis, back pain, falls, gout, joint swelling, muscle weakness and neck pain.   Gastrointestinal:  Negative for abdominal pain and change in bowel habit.   Neurological:  Negative for brief paralysis, disturbances in coordination, dizziness, numbness and paresthesias.   Psychiatric/Behavioral:  Negative for altered mental status and depression.        Objective:          General    Constitutional: She is oriented to person, place, and time. She appears well-developed and well-nourished.  No distress.   HENT:   Head: Atraumatic.   Eyes: EOM are normal. Right eye exhibits no discharge. Left eye exhibits no discharge.   Cardiovascular:  Normal rate.            Pulmonary/Chest: Effort normal. No respiratory distress.   Abdominal: Soft.   Neurological: She is alert and oriented to person, place, and time.   Psychiatric: She has a normal mood and affect. Her behavior is normal.         Right Shoulder Exam     Inspection/Observation   Swelling: absent  Bruising: absent  Scars: absent  Deformity: absent    Range of Motion   Active abduction:  normal   Passive abduction:  normal   Forward Flexion:  normal   Forward Elevation: normal  Internal rotation 0 degrees:  normal     Tests & Signs   Walter test: negative  Impingement: negative  Lift Off Sign: negative  Belly Press: negative    Other   Sensation: normal    Left Shoulder Exam     Inspection/Observation   Swelling: absent  Bruising: absent  Scars: absent  Deformity: absent    Range of Motion   Active abduction:  normal   Passive abduction:  normal   Forward Flexion:  normal   Forward Elevation: normal  Internal rotation 0 degrees:  normal     Tests & Signs   Walter test: positive  Impingement: positive  Lift Off Sign: negative  Belly Press: negative    Other   Sensation: normal     Comments:  Mild tenderness to anterior and posterior shoulder      Vascular Exam       Capillary Refill  Right Hand: normal capillary refill  Left Hand: normal capillary refill                Assessment:         Xray Left Shoulder 7/24/23:  There is glenohumeral joint space loss.  There is no acute fracture, dislocation, or bony erosion.      Encounter Diagnoses   Name Primary?    Acute pain of left shoulder Yes    Primary osteoarthritis of left shoulder     Acute pain of left shoulder  -     Ambulatory referral/consult to Orthopedics  -     triamcinolone acetonide injection 40 mg  -     meloxicam (MOBIC) 15 MG tablet; Take 1 tablet (15 mg total) by mouth once daily.   Dispense: 30 tablet; Refill: 0    Primary osteoarthritis of left shoulder  -     Ambulatory referral/consult to Orthopedics  -     triamcinolone acetonide injection 40 mg  -     meloxicam (MOBIC) 15 MG tablet; Take 1 tablet (15 mg total) by mouth once daily.  Dispense: 30 tablet; Refill: 0               Plan:         The total face-to-face encounter time with this patient was 30 minutes and greater than 50% of of the encounter time was spent counseling the patient, coordinating care, and education regarding the diagnosis. We have discussed a variety of treatment options including medications, injections, physical therapy and other alternative treatments. I also explained the indications, risks and benefits of surgery. Patient chooses to proceed with CSI and home exercises at this time. She would also like to try a different antiinflammatory medication.  Today, the patient chooses  a CSI and understands a minimum of 3 months time must lapse after injection, prior to a surgical procedure due to increased risk of infection.     I made the decision to obtain old records of the patient including previous notes and imaging. New imaging was ordered today of the extremity or extremities evaluated. I independently reviewed and interpreted the radiographs and/or MRIs today as well as prior imaging.      1. Injection Procedure  A time out was performed, including verification of patient ID, procedure, site and side, availability of information and equipment, review of safety issues, and agreement with consent, the procedure site was marked.    After time out was performed, the patient was prepped aseptically with chloraprep swabstick. A diagnostic and therapeutic injection of 1:3cc Kenalog/Marcaine was given under sterile technique using a 22g x 1.5 needle from the Posterior  aspect of the left Subacromial in the sitting position.      Rubia Mathews had no adverse reactions to the medication. Pain decreased. She was  instructed to apply ice to the joint for 20 minutes and avoid strenuous activities for 24-36 hours following the injection. She was warned of possible blood sugar and/or blood pressure changes during that time. Following that time, she can resume regular activities.    She was reminded to call the clinic immediately for any adverse side effects as explained in clinic today.    2. Ice compress to the affected area 2-3x a day for 15-20 minutes as needed for pain management.  3. Mobic 15 mg 1 time daily PRN for pain management. Stop all other NSAIDs.  4. Shoulder conditioning exercises.   5. RTC in 6 weeks for follow-up, sooner if needed.    Patient voices understanding of and agreement with treatment plan. All of the patient's questions were answered and the patient will contact us if she has any questions or concerns in the interim.

## 2023-07-24 NOTE — TELEPHONE ENCOUNTER
Will try a very short 5d course of flexeril as she requested. Ortho prescribed Mobic so please tell her that she should NOT take Mobic and topical Voltaren together.

## 2023-07-31 ENCOUNTER — OFFICE VISIT (OUTPATIENT)
Dept: INTERNAL MEDICINE | Facility: CLINIC | Age: 68
End: 2023-07-31
Payer: MEDICARE

## 2023-07-31 VITALS
OXYGEN SATURATION: 98 % | DIASTOLIC BLOOD PRESSURE: 72 MMHG | RESPIRATION RATE: 18 BRPM | HEART RATE: 63 BPM | BODY MASS INDEX: 35.38 KG/M2 | TEMPERATURE: 98 F | SYSTOLIC BLOOD PRESSURE: 132 MMHG | WEIGHT: 187.38 LBS | HEIGHT: 61 IN

## 2023-07-31 DIAGNOSIS — J45.20 MILD INTERMITTENT ASTHMATIC BRONCHITIS WITHOUT COMPLICATION: ICD-10-CM

## 2023-07-31 DIAGNOSIS — E78.5 HYPERLIPIDEMIA, UNSPECIFIED HYPERLIPIDEMIA TYPE: ICD-10-CM

## 2023-07-31 DIAGNOSIS — E55.9 VITAMIN D DEFICIENCY: ICD-10-CM

## 2023-07-31 DIAGNOSIS — I10 ESSENTIAL HYPERTENSION: Primary | ICD-10-CM

## 2023-07-31 DIAGNOSIS — E66.01 SEVERE OBESITY (BMI 35.0-39.9) WITH COMORBIDITY: ICD-10-CM

## 2023-07-31 DIAGNOSIS — R73.03 PRE-DIABETES: ICD-10-CM

## 2023-07-31 DIAGNOSIS — D50.9 IRON DEFICIENCY ANEMIA, UNSPECIFIED IRON DEFICIENCY ANEMIA TYPE: ICD-10-CM

## 2023-07-31 PROCEDURE — 99215 OFFICE O/P EST HI 40 MIN: CPT | Mod: S$GLB,,, | Performed by: INTERNAL MEDICINE

## 2023-07-31 PROCEDURE — 3078F PR MOST RECENT DIASTOLIC BLOOD PRESSURE < 80 MM HG: ICD-10-PCS | Mod: CPTII,S$GLB,, | Performed by: INTERNAL MEDICINE

## 2023-07-31 PROCEDURE — 1159F PR MEDICATION LIST DOCUMENTED IN MEDICAL RECORD: ICD-10-PCS | Mod: CPTII,S$GLB,, | Performed by: INTERNAL MEDICINE

## 2023-07-31 PROCEDURE — 3288F FALL RISK ASSESSMENT DOCD: CPT | Mod: CPTII,S$GLB,, | Performed by: INTERNAL MEDICINE

## 2023-07-31 PROCEDURE — 1159F MED LIST DOCD IN RCRD: CPT | Mod: CPTII,S$GLB,, | Performed by: INTERNAL MEDICINE

## 2023-07-31 PROCEDURE — 3044F HG A1C LEVEL LT 7.0%: CPT | Mod: CPTII,S$GLB,, | Performed by: INTERNAL MEDICINE

## 2023-07-31 PROCEDURE — 3288F PR FALLS RISK ASSESSMENT DOCUMENTED: ICD-10-PCS | Mod: CPTII,S$GLB,, | Performed by: INTERNAL MEDICINE

## 2023-07-31 PROCEDURE — 3078F DIAST BP <80 MM HG: CPT | Mod: CPTII,S$GLB,, | Performed by: INTERNAL MEDICINE

## 2023-07-31 PROCEDURE — 3008F BODY MASS INDEX DOCD: CPT | Mod: CPTII,S$GLB,, | Performed by: INTERNAL MEDICINE

## 2023-07-31 PROCEDURE — 3075F SYST BP GE 130 - 139MM HG: CPT | Mod: CPTII,S$GLB,, | Performed by: INTERNAL MEDICINE

## 2023-07-31 PROCEDURE — 1101F PR PT FALLS ASSESS DOC 0-1 FALLS W/OUT INJ PAST YR: ICD-10-PCS | Mod: CPTII,S$GLB,, | Performed by: INTERNAL MEDICINE

## 2023-07-31 PROCEDURE — 1126F AMNT PAIN NOTED NONE PRSNT: CPT | Mod: CPTII,S$GLB,, | Performed by: INTERNAL MEDICINE

## 2023-07-31 PROCEDURE — 3044F PR MOST RECENT HEMOGLOBIN A1C LEVEL <7.0%: ICD-10-PCS | Mod: CPTII,S$GLB,, | Performed by: INTERNAL MEDICINE

## 2023-07-31 PROCEDURE — 99999 PR PBB SHADOW E&M-EST. PATIENT-LVL IV: ICD-10-PCS | Mod: PBBFAC,,, | Performed by: INTERNAL MEDICINE

## 2023-07-31 PROCEDURE — 99215 PR OFFICE/OUTPT VISIT, EST, LEVL V, 40-54 MIN: ICD-10-PCS | Mod: S$GLB,,, | Performed by: INTERNAL MEDICINE

## 2023-07-31 PROCEDURE — 99999 PR PBB SHADOW E&M-EST. PATIENT-LVL IV: CPT | Mod: PBBFAC,,, | Performed by: INTERNAL MEDICINE

## 2023-07-31 PROCEDURE — 1101F PT FALLS ASSESS-DOCD LE1/YR: CPT | Mod: CPTII,S$GLB,, | Performed by: INTERNAL MEDICINE

## 2023-07-31 PROCEDURE — 1126F PR PAIN SEVERITY QUANTIFIED, NO PAIN PRESENT: ICD-10-PCS | Mod: CPTII,S$GLB,, | Performed by: INTERNAL MEDICINE

## 2023-07-31 PROCEDURE — 3075F PR MOST RECENT SYSTOLIC BLOOD PRESS GE 130-139MM HG: ICD-10-PCS | Mod: CPTII,S$GLB,, | Performed by: INTERNAL MEDICINE

## 2023-07-31 PROCEDURE — 3008F PR BODY MASS INDEX (BMI) DOCUMENTED: ICD-10-PCS | Mod: CPTII,S$GLB,, | Performed by: INTERNAL MEDICINE

## 2023-07-31 NOTE — PROGRESS NOTES
Chief Complaint  Chief Complaint   Patient presents with    Follow-up       HPI  Rubia Giovanni Mathews is a 68 y.o. female with MHx of HLD, HTN and Pre-Diabetes that presents for 6 month follow up.  Their last appointment with primary care was 7/19/2023 for shoulder pain. Continues to have shoulder/arm pain. Last night the episode of pain lasted longer than usual 1-2 minutes vs seconds. Recently seen by orthopedics who administered a steroid shot, patient is unsure if this has helped. Also taking meloxicam daily and cyclobenzaprine PRN for pain/muscle spasms.     Patient was recently enrolled in [facility] smoking cessation program where she quit for approximately a month in May 2023 but she recently restarted smoking. Smoking about 1 ppw . No longer taking Wellbutrin as she didn't like the way it made her feel. Has contact for smoking cessation program and states she will call when she is ready.     Patient not taking vit D or iron supplement. Asymptomatic, denies PICA, fatigue or lightheadedness. Patient takes potassium supplement randomly throughout the week.     Patient cooks almost exclusively at home and has a well rounded diet. Patient doesn't exercise.     Risk Assessment: Previous mammogram on 3/23 was negative, DEXA completed 7/22 with repeat 3 years later, CRC screening completed 6/21 with repeat in 3 years. Patient informed about shingles, pneumococcal vaccines, states she will get them at her local pharmacy.     PAST MEDICAL HISTORY:  Past Medical History:   Diagnosis Date    Adjustment disorder with mixed anxiety and depressed mood     Diverticulitis of colon (without mention of hemorrhage)(562.11)     Diverticulosis of colon (without mention of hemorrhage)     HLD (hyperlipidemia)     HTN (hypertension)     Lumbago     Mitral valve disorder     Mitral valve disorders(424.0)     Pain in joint, shoulder region     Sarcoidosis        PAST SURGICAL HISTORY:  Past Surgical History:   Procedure Laterality  Date    CATARACT EXTRACTION W/  INTRAOCULAR LENS IMPLANT Right 03/29/2018    Dr. Ferrari    CATARACT EXTRACTION W/  INTRAOCULAR LENS IMPLANT Left 08/2018    COLONOSCOPY N/A 6/3/2021    Procedure: COLONOSCOPY;  Surgeon: Evelia Dorsey MD;  Location: [facility];  Service: Endoscopy;  Laterality: N/A;    ESOPHAGOGASTRODUODENOSCOPY N/A 7/6/2021    Procedure: EGD (ESOPHAGOGASTRODUODENOSCOPY);  Surgeon: Evelia Dorsey MD;  Location: [facility];  Service: Endoscopy;  Laterality: N/A;    HYSTERECTOMY      TOTAL ABDOMINAL HYSTERECTOMY W/ BILATERAL SALPINGOOPHORECTOMY      vocal cord noduules         SOCIAL HISTORY:  Social History     Socioeconomic History    Marital status:    Occupational History     Employer: [employer]   Tobacco Use    Smoking status: Every Day     Current packs/day: 0.00     Types: Cigarettes    Tobacco comments:     a pack/week-trying to quit   Substance and Sexual Activity    Alcohol use: Yes     Comment: Social - Drinks wine     Drug use: No    Sexual activity: Yes     Partners: Male   Social History Narrative    Son s/p renal transplant = > dx focal sclerosing glomerulonephritis. She lives with her . She  her first  after 17 years due to a crack problem. She has a son. He has focal segmental glomeronephritis. She is a smoker who is trying to quit. 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 Her  is employed in [employer]. She drinks alcohol occaisionally. She doesn't have a history of substance. Her  has been having affairs. She is planning to leave once she is financially able. She could be at risk for STD's.    She is walking 3 days a week.                  Social Determinants of Health     Financial Resource Strain: Low Risk  (5/30/2022)    Overall Financial Resource Strain (CARDIA)     Difficulty of Paying Living Expenses: Not hard at all   Food Insecurity: No Food Insecurity (5/30/2022)     Hunger Vital Sign     Worried About Running Out of Food in the Last Year: Never true     Ran Out of Food in the Last Year: Never true   Transportation Needs: No Transportation Needs (2022)    PRAPARE - Transportation     Lack of Transportation (Medical): No     Lack of Transportation (Non-Medical): No   Physical Activity: Inactive (2022)    Exercise Vital Sign     Days of Exercise per Week: 0 days     Minutes of Exercise per Session: 0 min   Stress: No Stress Concern Present (2022)    [place] [employer] - Occupational Stress Questionnaire     Feeling of Stress : Not at all   Social Connections: Unknown (2022)    Social Connection and Isolation Panel [NHANES]     Frequency of Communication with Friends and Family: Three times a week     Frequency of Social Gatherings with Friends and Family: Twice a week     Active Member of Clubs or Organizations: No     Attends Club or Organization Meetings: Never     Marital Status:    Housing Stability: Low Risk  (2022)    Housing Stability Vital Sign     Unable to Pay for Housing in the Last Year: No     Number of Places Lived in the Last Year: 1     Unstable Housing in the Last Year: No       FAMILY HISTORY:  Family History   Problem Relation Age of Onset    Diabetes Mother         - 1933    Hypertension Mother     Heart attack Mother 85        s/p stent    Cancer Father         d. 78 lymphoma    Hypertension Sister     Hypertension Sister     Transient ischemic attack Sister         no residual effects    Hypertension Brother     Kidney failure Son         s/p kidney transplant, multiple complications    No Known Problems Grandchild          2021    Amblyopia Neg Hx     Blindness Neg Hx     Cataracts Neg Hx     Glaucoma Neg Hx     Macular degeneration Neg Hx     Retinal detachment Neg Hx     Strabismus Neg Hx     Stroke Neg Hx     Thyroid disease Neg Hx        ALLERGIES AND MEDICATIONS: updated and  reviewed.  Review of patient's allergies indicates:   Allergen Reactions    Losartan Hives, Swelling and Rash    Ciprofloxacin      visual disturbance    Lisinopril      coughing    Zithromax [azithromycin] Diarrhea     Current Outpatient Medications   Medication Sig Dispense Refill    albuterol (PROVENTIL/VENTOLIN HFA) 90 mcg/actuation inhaler INHALE 2 PUFFS BY MOUTH EVERY 6 HOURS AS NEEDED FOR WHEEZING 18 g 3    amLODIPine (NORVASC) 10 MG tablet TAKE 1 TABLET(10 MG) BY MOUTH EVERY DAY 90 tablet 3    buPROPion (WELLBUTRIN SR) 150 MG TBSR 12 hr tablet Take 1 tablet (150 mg total) by mouth 2 (two) times daily. 60 tablet 0    carvediloL (COREG) 6.25 MG tablet TAKE 1 TABLET(6.25 MG) BY MOUTH TWICE DAILY 180 tablet 3    cholecalciferol, vitamin D3, (VITAMIN D3) 25 mcg (1,000 unit) capsule Take 1,000 Units by mouth once daily.      diclofenac sodium (VOLTAREN) 1 % Gel Apply 2 g topically 3 (three) times daily as needed (shoulder pain). 100 g 0    estradioL (ESTRACE) 0.01 % (0.1 mg/gram) vaginal cream SMARTSIG:Gram(s) Vaginal Every Night      meloxicam (MOBIC) 15 MG tablet Take 1 tablet (15 mg total) by mouth once daily. 30 tablet 0    nystatin (MYCOSTATIN) cream Apply topically 2 (two) times daily.      pantoprazole (PROTONIX) 40 MG tablet TAKE 1 TABLET BY MOUTH DAILY 30-45 MINUTES BEFORE MEAL 90 tablet 1    potassium chloride (MICRO-K) 8 mEq CpSR TAKE ONE CAPSULE BY MOUTH DAILY WITH FOOD AND 8 OZ OF LIQUID 90 capsule 1    TREMFYA 100 mg/mL AtIn Inject as directed. Every 2 months      triamterene-hydrochlorothiazide 37.5-25 mg (MAXZIDE-25) 37.5-25 mg per tablet TAKE 1 TABLET BY MOUTH EVERY DAY 90 tablet 3     No current facility-administered medications for this visit.         ROS  Review of Systems   Constitutional:  Negative for appetite change, fever and unexpected weight change.   HENT:  Negative for congestion, rhinorrhea and sore throat.    Eyes:  Negative for visual disturbance.   Respiratory:  Negative for apnea  "and shortness of breath.    Cardiovascular:  Negative for chest pain and leg swelling.   Gastrointestinal:  Negative for abdominal distention, nausea and vomiting.   Endocrine: Negative for polydipsia and polyuria.   Genitourinary:  Negative for dysuria and pelvic pain.   Musculoskeletal:  Negative for back pain.   Skin:  Negative for color change and wound.   Allergic/Immunologic: Negative for immunocompromised state.   Neurological:  Negative for dizziness, weakness, light-headedness and headaches.   Psychiatric/Behavioral:  Negative for agitation and behavioral problems.        Physical Exam  Vitals:    07/31/23 1046   BP: 132/72   BP Location: Left arm   Patient Position: Sitting   BP Method: Medium (Manual)   Pulse: 63   Resp: 18   Temp: 97.5 °F (36.4 °C)   TempSrc: Temporal   SpO2: 98%   Weight: 85 kg (187 lb 6.3 oz)   Height: 5' 1" (1.549 m)    Body mass index is 35.41 kg/m².  Weight: 85 kg (187 lb 6.3 oz)   Height: 5' 1" (154.9 cm)   Physical Exam  Constitutional:       General: She is not in acute distress.     Appearance: Normal appearance.   HENT:      Head: Normocephalic.      Nose: Nose normal. No congestion.      Mouth/Throat:      Mouth: Mucous membranes are moist.   Eyes:      Pupils: Pupils are equal, round, and reactive to light.   Cardiovascular:      Rate and Rhythm: Normal rate and regular rhythm.   Pulmonary:      Effort: Pulmonary effort is normal. No respiratory distress.   Abdominal:      General: Abdomen is flat. There is no distension.      Palpations: Abdomen is soft. There is no mass.      Tenderness: There is no abdominal tenderness.   Musculoskeletal:         General: No swelling. Normal range of motion.      Cervical back: Normal range of motion.   Skin:     General: Skin is warm.      Capillary Refill: Capillary refill takes less than 2 seconds.      Coloration: Skin is not jaundiced.      Findings: No rash.   Neurological:      General: No focal deficit present.      Mental Status: " She is alert and oriented to person, place, and time.   Psychiatric:         Mood and Affect: Mood normal.         Behavior: Behavior normal.           Health Maintenance         Date Due Completion Date    Pneumococcal Vaccines (Age 65+) (1 - PCV) Never done ---    Shingles Vaccine (1 of 2) Never done ---    COVID-19 Vaccine (3 - Mixed Product series) 01/26/2022 12/1/2021    Influenza Vaccine (1) 09/01/2023 ---    Hemoglobin A1c (Prediabetes) 01/24/2024 1/24/2023    Mammogram 03/06/2024 3/6/2023    Colorectal Cancer Screening 06/03/2024 6/3/2021    DEXA Scan 07/25/2025 7/25/2022    Lipid Panel 04/28/2026 4/28/2021    TETANUS VACCINE 12/24/2031 12/24/2021              Assessment and Plan:    Annual physical exam  - Screening up to date   - Recommended shingles and pneumococcal vaccines   - CBC, CMP, Lipids, TSH, Vit D, A1c ordered     Essential hypertension  - Stable, chronic   - Current regimen amlodipine 10mg, coreg 12.5mg, maxzide 37.5    Hyperlipidemia, unspecified hyperlipidemia type  - Previous lipid panel on 4/21 which cholesterol in range, 173 decreased from 205 in 2020  - Not on medication as CV risk is 6.8%  - Lipid panel ordered     Pre-diabetes  - A1c 6.0 on 1/23  - A1c ordered     Shoulder pain  - Followed by orthopedics who administered injection   - Taking meloxicam daily and cyclobenzaprine PRN per ortho  - Xray suggestive of OA    Vitamin D deficiency   - Previous level 22 on 7/22  - Recommended to begin 2,000 IC daily of Vit D3  - Vit D ordered     Chronic Anemia   - Previous Hgb 10.8 in 4/22  - Seen by heme in 2021 for sudden drop to 9.4, found to be iron def w/o source of bleeding   - Previously taking iron sup but no longer taking  - Currently asymptomatic  - CBC ordered        Diane Agosto, MS4  [facility]

## 2023-08-03 ENCOUNTER — TELEPHONE (OUTPATIENT)
Dept: INTERNAL MEDICINE | Facility: CLINIC | Age: 68
End: 2023-08-03
Payer: MEDICARE

## 2023-08-03 NOTE — TELEPHONE ENCOUNTER
----- Message from Stefanie Leal MD sent at 8/3/2023  3:24 PM CDT -----  Your lab has resulted and your vitamin-D level is still too low.    Please take 2000 IU of vitamin D3 daily.  Your hemoglobin A1c has improved slightly, please continue to avoid sugar and simple carbohydrates.  You should also stay well hydrated, remember water is your best option and strive to exercise 180 minutes weekly.  Your CBC has improved, you are no longer anemic although you do still have a low saturated low iron count.    Please continue to include iron rich foods in your daily diet.  Your thyroid test was in the normal range.  Your chemistries were all in the normal range, including sugar, kidney, and liver values    Your total cholesterol is 199, it has elevated since the last check.  In calculating your cardiovascular risk, that is the potential of a heart attack or stroke over the next 10 years your risk is around 10%, and the treatment threshold is if the risk is greater than 7.5%.    Based on these calculations I would recommend that you start a cholesterol medication, please advise if you are agreeable and a prescription will be sent to your pharmacy.    [email or web address]  .

## 2023-08-20 DIAGNOSIS — M25.512 ACUTE PAIN OF LEFT SHOULDER: ICD-10-CM

## 2023-08-20 DIAGNOSIS — M19.012 PRIMARY OSTEOARTHRITIS OF LEFT SHOULDER: ICD-10-CM

## 2023-08-23 RX ORDER — MELOXICAM 15 MG/1
15 TABLET ORAL
Qty: 30 TABLET | Refills: 0 | Status: SHIPPED | OUTPATIENT
Start: 2023-08-23

## 2023-08-28 ENCOUNTER — TELEPHONE (OUTPATIENT)
Dept: SMOKING CESSATION | Facility: CLINIC | Age: 68
End: 2023-08-28
Payer: MEDICARE

## 2023-10-20 NOTE — PATIENT INSTRUCTIONS
Stay well hydrated  Gargle with warm salt water  Warm and cool fluids  Rest  Tylenol/ibuprofen prn  
English

## 2024-01-06 DIAGNOSIS — K21.00 GASTROESOPHAGEAL REFLUX DISEASE WITH ESOPHAGITIS WITHOUT HEMORRHAGE: ICD-10-CM

## 2024-01-08 RX ORDER — POTASSIUM CHLORIDE 600 MG/1
CAPSULE, EXTENDED RELEASE ORAL
Qty: 90 CAPSULE | Refills: 1 | Status: SHIPPED | OUTPATIENT
Start: 2024-01-08

## 2024-01-08 RX ORDER — PANTOPRAZOLE SODIUM 40 MG/1
TABLET, DELAYED RELEASE ORAL
Qty: 90 TABLET | Refills: 1 | Status: SHIPPED | OUTPATIENT
Start: 2024-01-08

## 2024-02-08 ENCOUNTER — TELEPHONE (OUTPATIENT)
Dept: SMOKING CESSATION | Facility: CLINIC | Age: 69
End: 2024-02-08
Payer: MEDICARE

## 2024-02-22 ENCOUNTER — TELEPHONE (OUTPATIENT)
Dept: SMOKING CESSATION | Facility: CLINIC | Age: 69
End: 2024-02-22
Payer: MEDICARE

## 2024-02-22 NOTE — TELEPHONE ENCOUNTER
2nd attempt left message regarding smoking cessation quit 1 episode.  Voice message includes name and contact information.

## 2024-04-30 ENCOUNTER — TELEPHONE (OUTPATIENT)
Dept: INTERNAL MEDICINE | Facility: CLINIC | Age: 69
End: 2024-04-30
Payer: MEDICARE

## 2024-04-30 DIAGNOSIS — Z12.31 SCREENING MAMMOGRAM FOR BREAST CANCER: Primary | ICD-10-CM

## 2024-04-30 NOTE — TELEPHONE ENCOUNTER
----- Message from Rhettashli Farmer sent at 4/30/2024  1:04 PM CDT -----  Contact: Self [phone]  Would like to receive medical advice.    Would they like a call back or a response via [facility]:  call back    Additional information:  Calling to get her mammo order put in

## 2024-05-02 ENCOUNTER — OFFICE VISIT (OUTPATIENT)
Dept: URGENT CARE | Facility: CLINIC | Age: 69
End: 2024-05-02
Payer: MEDICARE

## 2024-05-02 VITALS
BODY MASS INDEX: 35.3 KG/M2 | TEMPERATURE: 99 F | RESPIRATION RATE: 20 BRPM | HEIGHT: 61 IN | SYSTOLIC BLOOD PRESSURE: 118 MMHG | WEIGHT: 187 LBS | HEART RATE: 67 BPM | DIASTOLIC BLOOD PRESSURE: 72 MMHG | OXYGEN SATURATION: 96 %

## 2024-05-02 DIAGNOSIS — E78.5 HYPERLIPIDEMIA, UNSPECIFIED HYPERLIPIDEMIA TYPE: ICD-10-CM

## 2024-05-02 DIAGNOSIS — J20.9 ACUTE BRONCHITIS, UNSPECIFIED ORGANISM: ICD-10-CM

## 2024-05-02 DIAGNOSIS — I05.9 MITRAL VALVE DISORDER: ICD-10-CM

## 2024-05-02 DIAGNOSIS — H66.002 ACUTE SUPPURATIVE OTITIS MEDIA OF LEFT EAR WITHOUT SPONTANEOUS RUPTURE OF TYMPANIC MEMBRANE, RECURRENCE NOT SPECIFIED: Primary | ICD-10-CM

## 2024-05-02 DIAGNOSIS — I10 HYPERTENSION, UNSPECIFIED TYPE: ICD-10-CM

## 2024-05-02 DIAGNOSIS — R05.9 COUGH, UNSPECIFIED TYPE: ICD-10-CM

## 2024-05-02 LAB
CTP QC/QA: YES
SARS-COV-2 AG RESP QL IA.RAPID: NEGATIVE

## 2024-05-02 PROCEDURE — 99214 OFFICE O/P EST MOD 30 MIN: CPT | Mod: 25,S$GLB,, | Performed by: PHYSICIAN ASSISTANT

## 2024-05-02 PROCEDURE — 87811 SARS-COV-2 COVID19 W/OPTIC: CPT | Mod: QW,S$GLB,, | Performed by: PHYSICIAN ASSISTANT

## 2024-05-02 PROCEDURE — 94640 AIRWAY INHALATION TREATMENT: CPT | Mod: S$GLB,,, | Performed by: FAMILY MEDICINE

## 2024-05-02 RX ORDER — ALBUTEROL SULFATE 0.83 MG/ML
2.5 SOLUTION RESPIRATORY (INHALATION)
Status: COMPLETED | OUTPATIENT
Start: 2024-05-02 | End: 2024-05-02

## 2024-05-02 RX ORDER — CEFDINIR 300 MG/1
300 CAPSULE ORAL 2 TIMES DAILY
Qty: 14 CAPSULE | Refills: 0 | Status: SHIPPED | OUTPATIENT
Start: 2024-05-02 | End: 2024-05-09

## 2024-05-02 RX ORDER — PREDNISONE 20 MG/1
40 TABLET ORAL DAILY
Qty: 8 TABLET | Refills: 0 | Status: SHIPPED | OUTPATIENT
Start: 2024-05-02 | End: 2024-05-06

## 2024-05-02 RX ORDER — IPRATROPIUM BROMIDE 0.5 MG/2.5ML
0.5 SOLUTION RESPIRATORY (INHALATION)
Status: COMPLETED | OUTPATIENT
Start: 2024-05-02 | End: 2024-05-02

## 2024-05-02 RX ORDER — ALBUTEROL SULFATE 90 UG/1
2 AEROSOL, METERED RESPIRATORY (INHALATION) EVERY 6 HOURS PRN
Qty: 6.7 G | Refills: 0 | Status: SHIPPED | OUTPATIENT
Start: 2024-05-02

## 2024-05-02 RX ORDER — PROMETHAZINE HYDROCHLORIDE AND DEXTROMETHORPHAN HYDROBROMIDE 6.25; 15 MG/5ML; MG/5ML
5 SYRUP ORAL EVERY 4 HOURS PRN
Qty: 118 ML | Refills: 0 | Status: SHIPPED | OUTPATIENT
Start: 2024-05-02 | End: 2024-05-12

## 2024-05-02 RX ADMIN — ALBUTEROL SULFATE 2.5 MG: 0.83 SOLUTION RESPIRATORY (INHALATION) at 10:05

## 2024-05-02 RX ADMIN — IPRATROPIUM BROMIDE 0.5 MG: 0.5 SOLUTION RESPIRATORY (INHALATION) at 10:05

## 2024-05-02 NOTE — PROGRESS NOTES
"Subjective:      Patient ID: Rubia Mathews is a 68 y.o. female.    Vitals:  height is 5' 1" (1.549 m) and weight is 84.8 kg (187 lb). Her oral temperature is 98.8 °F (37.1 °C). Her blood pressure is 118/72 and her pulse is 67. Her respiration is 20 and oxygen saturation is 96%.     Chief Complaint: Cough    Pt complains of cough, congestion, sore throat. Pt has been taking throat drops and tylenol with mild relief. Pt denies fever. Symptoms started about 2 days ago.    Patient provider note starts here:  Patient presents to the clinic with complaints of dry cough, nasal congestion, sore throat for the past 2-3 days.  Reports she has been using throat lozenges and Tylenol without any significant relief.  Denies any known fevers.  Reports that her granddaughter who lives with her has been sick with similar symptoms as well.  Patient denies any chest pain or shortness of breath.  She does smoke tobacco but states she has not smoked in the past 2 weeks.    Cough  This is a new problem. The current episode started in the past 7 days. The problem has been gradually worsening. The problem occurs constantly. The cough is Non-productive. Associated symptoms include nasal congestion and a sore throat. Pertinent negatives include no chest pain, fever, shortness of breath or wheezing. Nothing aggravates the symptoms. Treatments tried: throat drops, tylenol. The treatment provided no relief.       Constitution: Positive for fatigue. Negative for fever.   HENT:  Positive for congestion and sore throat.    Neck: Negative for neck pain.   Cardiovascular:  Negative for chest pain, palpitations and sob on exertion.   Respiratory:  Positive for cough. Negative for chest tightness, sputum production, shortness of breath and wheezing.    Gastrointestinal:  Negative for abdominal pain, vomiting and diarrhea.   Skin:  Negative for color change and wound.   Neurological:  Negative for numbness and tingling.      Objective: "     Physical Exam   Constitutional: She is oriented to person, place, and time. She appears well-developed. She is cooperative.  Non-toxic appearance. She does not appear ill. No distress.   HENT:   Head: Normocephalic and atraumatic.   Ears:   Right Ear: Hearing, tympanic membrane, external ear and ear canal normal.   Left Ear: Hearing, external ear and ear canal normal.      Comments: Purulent air fluid level noted behind the left TM. There is no mastoid TTP.   Nose: Congestion present. No mucosal edema, rhinorrhea or nasal deformity. No epistaxis. Right sinus exhibits no maxillary sinus tenderness and no frontal sinus tenderness. Left sinus exhibits no maxillary sinus tenderness and no frontal sinus tenderness.   Mouth/Throat: Uvula is midline, oropharynx is clear and moist and mucous membranes are normal. No trismus in the jaw. Normal dentition. No uvula swelling. No oropharyngeal exudate, posterior oropharyngeal edema or posterior oropharyngeal erythema.   Eyes: Conjunctivae and lids are normal. No scleral icterus.   Neck: Trachea normal and phonation normal. Neck supple. No edema present. No erythema present. No neck rigidity present.   Cardiovascular: Normal rate, regular rhythm, normal heart sounds and normal pulses.   Pulmonary/Chest: Effort normal and breath sounds normal. No respiratory distress. She has no decreased breath sounds. She has no wheezes. She has no rhonchi.         Comments: Decreased air movement to all lung fields. There is no rhonchi or wheezing noted.     Abdominal: Normal appearance.   Musculoskeletal: Normal range of motion.         General: No deformity. Normal range of motion.   Neurological: She is alert and oriented to person, place, and time. She exhibits normal muscle tone. Coordination normal.   Skin: Skin is warm, dry, intact, not diaphoretic and not pale. Capillary refill takes less than 2 seconds.   Psychiatric: Her speech is normal and behavior is normal. Judgment and  thought content normal.   Nursing note and vitals reviewed.      Assessment:     1. Acute suppurative otitis media of left ear without spontaneous rupture of tympanic membrane, recurrence not specified    2. Cough, unspecified type    3. Hypertension, unspecified type    4. Hyperlipidemia, unspecified hyperlipidemia type    5. Mitral valve disorder    6. Acute bronchitis, unspecified organism      Results for orders placed or performed in visit on 05/02/24   SARS Coronavirus 2 Antigen, POCT Manual Read   Result Value Ref Range    SARS Coronavirus 2 Antigen Negative Negative     Acceptable Yes        Plan:       Acute suppurative otitis media of left ear without spontaneous rupture of tympanic membrane, recurrence not specified  -     cefdinir (OMNICEF) 300 MG capsule; Take 1 capsule (300 mg total) by mouth 2 (two) times daily. for 7 days  Dispense: 14 capsule; Refill: 0    Cough, unspecified type  -     SARS Coronavirus 2 Antigen, POCT Manual Read  -     albuterol nebulizer solution 2.5 mg  -     ipratropium 0.02 % nebulizer solution 0.5 mg    Hypertension, unspecified type    Hyperlipidemia, unspecified hyperlipidemia type    Mitral valve disorder    Acute bronchitis, unspecified organism  -     predniSONE (DELTASONE) 20 MG tablet; Take 2 tablets (40 mg total) by mouth once daily. for 4 days  Dispense: 8 tablet; Refill: 0  -     albuterol (PROVENTIL HFA) 90 mcg/actuation inhaler; Inhale 2 puffs into the lungs every 6 (six) hours as needed for Shortness of Breath or Wheezing. Rescue  Dispense: 6.7 g; Refill: 0  -     promethazine-dextromethorphan (PROMETHAZINE-DM) 6.25-15 mg/5 mL Syrp; Take 5 mLs by mouth every 4 (four) hours as needed (COUGH).  Dispense: 118 mL; Refill: 0          Medical Decision Making:   History:   Old Medical Records: I decided to obtain old medical records.  Clinical Tests:   Lab Tests: Ordered and Reviewed  Urgent Care Management:  A. Problem List:   -Acute: Acute bronchitis,  left otitis media    -Chronic: HTN, HLD, pre-diabetes   B. Differential diagnosis: viral vs bacterial URI, pharyngitis, otitis, COVID 19, influenza, pneumonia, bronchitis   C. Diagnostic Testing Ordered: COVID   D. Diagnostic Testing Considered: None  E. Independent Historians: None  F. Urgent Care Midlevel Independent Results Interpretation: COVID negative   G. Radiology:  H. Review of Previous Medical Records:   I. Home Medications Reviewed  J. Social Determinants of Health considered  K. Medical Decision Making and Disposition:  Patient presents to the clinic with complaints of a cough, nasal congestion for the past 2-3 days.  On exam, she is afebrile and nontoxic in appearance.  She is decreased air movement to all lung fields but there is no wheezing or rhonchi noted.  COVID is negative.  There is a purulent middle ear effusion noted on the left. She was given a neb treatment in clinic and she had some improvement with increased air movement afterwards. Advised close follow-up with PCP. ED precautions discussed, she verbalized understanding and agreed with plan.          Patient Instructions   You have been prescribed a steroid today. Take the prescription as directed. Steroids can increase blood sugar. You can also have the following when taking steroids: flushing, jitteriness, weight gain, fluid retention, bone weakening. If you develop any adverse symptoms, stop taking the medication immediately.    Thank you for choosing [facility]!     Our goal in the Urgent Care is to always provide outstanding medical care. You may receive a survey by mail or e-mail in the next week regarding your experience today. We would greatly appreciate you completing and returning the survey. Your feedback provides us with a way to recognize our staff who provide very good care, and it helps us learn how to improve when your experience was below our aspiration of excellence.       We appreciate you trusting us with your  medical care. We hope you feel better soon. We will be happy to take care of you for all of your future medical needs.    You must understand that you've received an Urgent Care treatment only and that you may be released before all your medical problems are known or treated. You, the patient, will arrange for follow up care as instructed.      Follow up with your PCP or specialty clinic as instructed in the next 2-3 days if not improved or as needed. You can call [phone] to schedule an appointment with appropriate provider.      If you condition worsens, we recommend that you receive another evaluation at the emergency room immediately or contact your primary medical clinic's after hours call service to discuss your concerns.      Please return here or go to the Emergency Department for any concerns or worsening condition.

## 2024-05-02 NOTE — PATIENT INSTRUCTIONS
You have been prescribed a steroid today. Take the prescription as directed. Steroids can increase blood sugar. You can also have the following when taking steroids: flushing, jitteriness, weight gain, fluid retention, bone weakening. If you develop any adverse symptoms, stop taking the medication immediately.    Thank you for choosing [facility]!     Our goal in the Urgent Care is to always provide outstanding medical care. You may receive a survey by mail or e-mail in the next week regarding your experience today. We would greatly appreciate you completing and returning the survey. Your feedback provides us with a way to recognize our staff who provide very good care, and it helps us learn how to improve when your experience was below our aspiration of excellence.       We appreciate you trusting us with your medical care. We hope you feel better soon. We will be happy to take care of you for all of your future medical needs.    You must understand that you've received an Urgent Care treatment only and that you may be released before all your medical problems are known or treated. You, the patient, will arrange for follow up care as instructed.      Follow up with your PCP or specialty clinic as instructed in the next 2-3 days if not improved or as needed. You can call [phone] to schedule an appointment with appropriate provider.      If you condition worsens, we recommend that you receive another evaluation at the emergency room immediately or contact your primary medical clinic's after hours call service to discuss your concerns.      Please return here or go to the Emergency Department for any concerns or worsening condition.

## 2024-06-04 ENCOUNTER — TELEPHONE (OUTPATIENT)
Dept: INTERNAL MEDICINE | Facility: CLINIC | Age: 69
End: 2024-06-04
Payer: MEDICARE

## 2024-06-04 NOTE — TELEPHONE ENCOUNTER
----- Message from Crissy Petersen sent at 6/4/2024 10:49 AM CDT -----  Contact: Pt  [phone]  2TESTRESULTS    Type: Test Results Mammogram    What test was performed? Mammogram    Who ordered the test? Dr Penny    When and where were the test performed? 05/29/24 [facility]    Would you like response via Mychart: Callback    Comments:

## 2024-06-04 NOTE — TELEPHONE ENCOUNTER
----- Message from Genevieve Davis sent at 6/4/2024 10:55 AM CDT -----  Contact: Self [phone]  Would like to receive medical advice.    Would they like a call back or a response via [facility]:  call back    Additional information:  Calling to schedule annual visit around 7/31 to no avail.

## 2024-06-04 NOTE — TELEPHONE ENCOUNTER
Spoke to patient and she was advised that results were not in system yet .  Informed that breast center usually notify her of the result once it has been read .

## 2024-06-10 ENCOUNTER — TELEPHONE (OUTPATIENT)
Dept: INTERNAL MEDICINE | Facility: CLINIC | Age: 69
End: 2024-06-10
Payer: MEDICARE

## 2024-06-10 NOTE — TELEPHONE ENCOUNTER
----- Message from Stefanie Leal MD sent at 6/7/2024  3:29 PM CDT -----  Please note that your mammogram was read as follows  Impression:  There is no mammographic evidence of malignancy.   [email or web address]

## 2024-07-05 RX ORDER — CARVEDILOL 6.25 MG/1
TABLET ORAL
Qty: 60 TABLET | Refills: 0 | Status: SHIPPED | OUTPATIENT
Start: 2024-07-05

## 2024-07-05 RX ORDER — AMLODIPINE BESYLATE 10 MG/1
TABLET ORAL
Qty: 30 TABLET | Refills: 0 | Status: SHIPPED | OUTPATIENT
Start: 2024-07-05

## 2024-07-05 RX ORDER — TRIAMTERENE/HYDROCHLOROTHIAZID 37.5-25 MG
TABLET ORAL
Qty: 30 TABLET | Refills: 0 | Status: SHIPPED | OUTPATIENT
Start: 2024-07-05

## 2024-07-31 ENCOUNTER — OFFICE VISIT (OUTPATIENT)
Dept: INTERNAL MEDICINE | Facility: CLINIC | Age: 69
End: 2024-07-31
Payer: MEDICARE

## 2024-07-31 VITALS
TEMPERATURE: 98 F | WEIGHT: 176 LBS | DIASTOLIC BLOOD PRESSURE: 74 MMHG | BODY MASS INDEX: 33.23 KG/M2 | HEART RATE: 64 BPM | RESPIRATION RATE: 16 BRPM | SYSTOLIC BLOOD PRESSURE: 128 MMHG | HEIGHT: 61 IN

## 2024-07-31 DIAGNOSIS — K21.00 GASTROESOPHAGEAL REFLUX DISEASE WITH ESOPHAGITIS WITHOUT HEMORRHAGE: ICD-10-CM

## 2024-07-31 DIAGNOSIS — E66.9 CLASS 1 OBESITY WITH SERIOUS COMORBIDITY AND BODY MASS INDEX (BMI) OF 33.0 TO 33.9 IN ADULT, UNSPECIFIED OBESITY TYPE: ICD-10-CM

## 2024-07-31 DIAGNOSIS — R73.03 PRE-DIABETES: ICD-10-CM

## 2024-07-31 DIAGNOSIS — Z86.010 PERSONAL HISTORY OF COLONIC POLYPS: ICD-10-CM

## 2024-07-31 DIAGNOSIS — D50.8 IRON DEFICIENCY ANEMIA SECONDARY TO INADEQUATE DIETARY IRON INTAKE: ICD-10-CM

## 2024-07-31 DIAGNOSIS — Z00.00 PREVENTATIVE HEALTH CARE: Primary | ICD-10-CM

## 2024-07-31 DIAGNOSIS — E55.9 VITAMIN D INSUFFICIENCY: ICD-10-CM

## 2024-07-31 DIAGNOSIS — I10 PRIMARY HYPERTENSION: ICD-10-CM

## 2024-07-31 DIAGNOSIS — L40.9 PSORIASIS: ICD-10-CM

## 2024-07-31 DIAGNOSIS — E78.5 HYPERLIPIDEMIA, UNSPECIFIED HYPERLIPIDEMIA TYPE: ICD-10-CM

## 2024-07-31 PROBLEM — E66.01 SEVERE OBESITY (BMI 35.0-39.9) WITH COMORBIDITY: Status: RESOLVED | Noted: 2022-07-13 | Resolved: 2024-07-31

## 2024-07-31 PROCEDURE — 99397 PER PM REEVAL EST PAT 65+ YR: CPT | Mod: S$GLB,,, | Performed by: FAMILY MEDICINE

## 2024-07-31 PROCEDURE — 1160F RVW MEDS BY RX/DR IN RCRD: CPT | Mod: CPTII,S$GLB,, | Performed by: FAMILY MEDICINE

## 2024-07-31 PROCEDURE — 3288F FALL RISK ASSESSMENT DOCD: CPT | Mod: CPTII,S$GLB,, | Performed by: FAMILY MEDICINE

## 2024-07-31 PROCEDURE — 1101F PT FALLS ASSESS-DOCD LE1/YR: CPT | Mod: CPTII,S$GLB,, | Performed by: FAMILY MEDICINE

## 2024-07-31 PROCEDURE — 1126F AMNT PAIN NOTED NONE PRSNT: CPT | Mod: CPTII,S$GLB,, | Performed by: FAMILY MEDICINE

## 2024-07-31 PROCEDURE — 1159F MED LIST DOCD IN RCRD: CPT | Mod: CPTII,S$GLB,, | Performed by: FAMILY MEDICINE

## 2024-07-31 PROCEDURE — 3074F SYST BP LT 130 MM HG: CPT | Mod: CPTII,S$GLB,, | Performed by: FAMILY MEDICINE

## 2024-07-31 PROCEDURE — 3008F BODY MASS INDEX DOCD: CPT | Mod: CPTII,S$GLB,, | Performed by: FAMILY MEDICINE

## 2024-07-31 PROCEDURE — 3078F DIAST BP <80 MM HG: CPT | Mod: CPTII,S$GLB,, | Performed by: FAMILY MEDICINE

## 2024-07-31 PROCEDURE — 99999 PR PBB SHADOW E&M-EST. PATIENT-LVL IV: CPT | Mod: PBBFAC,,, | Performed by: FAMILY MEDICINE

## 2024-07-31 RX ORDER — POTASSIUM CHLORIDE 600 MG/1
8 CAPSULE, EXTENDED RELEASE ORAL DAILY
Qty: 90 CAPSULE | Refills: 3 | Status: SHIPPED | OUTPATIENT
Start: 2024-07-31

## 2024-07-31 RX ORDER — DICYCLOMINE HYDROCHLORIDE 20 MG/1
20 TABLET ORAL EVERY 6 HOURS PRN
Qty: 30 TABLET | Refills: 3 | Status: SHIPPED | OUTPATIENT
Start: 2024-07-31 | End: 2024-07-31

## 2024-07-31 RX ORDER — AMLODIPINE BESYLATE 10 MG/1
10 TABLET ORAL DAILY
Qty: 90 TABLET | Refills: 3 | Status: SHIPPED | OUTPATIENT
Start: 2024-07-31

## 2024-07-31 RX ORDER — PANTOPRAZOLE SODIUM 40 MG/1
40 TABLET, DELAYED RELEASE ORAL DAILY
Qty: 90 TABLET | Refills: 3 | Status: SHIPPED | OUTPATIENT
Start: 2024-07-31

## 2024-07-31 RX ORDER — DICYCLOMINE HYDROCHLORIDE 20 MG/1
20 TABLET ORAL EVERY 6 HOURS PRN
Qty: 30 TABLET | Refills: 3 | Status: SHIPPED | OUTPATIENT
Start: 2024-07-31 | End: 2024-08-30

## 2024-07-31 RX ORDER — CARVEDILOL 6.25 MG/1
6.25 TABLET ORAL DAILY
Qty: 180 TABLET | Refills: 3 | Status: SHIPPED | OUTPATIENT
Start: 2024-07-31

## 2024-07-31 RX ORDER — TRIAMTERENE/HYDROCHLOROTHIAZID 37.5-25 MG
1 TABLET ORAL DAILY
Qty: 90 TABLET | Refills: 3 | Status: SHIPPED | OUTPATIENT
Start: 2024-07-31

## 2024-07-31 NOTE — PROGRESS NOTES
Subjective     Patient ID: Rubia Mathews is a 69 y.o. female.    Chief Complaint: Annual Exam (Not fasting for labs today.  She is aware dr penny to retire soon.    Wondering if can get rx for dicyclomine for tummy. Should be in history.  .)    HPI 69-year-old  female former patient of Dr. Penny presents to clinic today for annual physical exam and in order to establish care.  She continues to be treated for hypertension which remains well controlled on amlodipine 10 mg daily, carvedilol 6.25 mg daily, and triamterene-hydrochlorothiazide 37.5/25 mg daily.  She continues to take potassium chloride 8 mEq daily secondary to occasional hyperkalemia secondary to triamterene-hydrochlorothiazide.  Hyperlipidemia remains stable with diet and exercise as does prediabetes.  She continues to take ferrous sulfate daily secondary to iron-deficiency anemia.  GERD remains stable on pantoprazole 40 mg daily.  She continues to take vitamin-D 2000 units daily for treatment of vitamin-D insufficiency with stability of symptoms.  She continues to be followed by dermatology for treatment of psoriasis which remains stable on Tremfya.  She reports having bladder prolapse for which he has seen an outside your gynecologist for evaluation.  She reports a past surgical history of hysterectomy, bilateral cataract extraction, vocal cord nodule removal, and EGD and colonoscopy for workup of iron-deficiency anemia.  She reports a family history of her mother having diabetes, hypertension, and a heart attack with stent placement at the age of 85.  Her father passed away at the age of 78 from lymphoma.  All of her siblings have hypertension and 1 sister had a TIA.  Her son had renal failure with a kidney transplant.  She is up-to-date with all screening exams except her colonoscopy which has been ordered.  Pneumonia vaccine has been discussed but has been deferred at this time.  Review of Systems   Constitutional:   Negative for appetite change, chills, fatigue and fever.   HENT:  Negative for nasal congestion, ear pain, hearing loss, postnasal drip, rhinorrhea, sinus pressure/congestion, sore throat and tinnitus.    Eyes:  Negative for redness, itching and visual disturbance.   Respiratory:  Negative for cough, chest tightness and shortness of breath.    Cardiovascular:  Negative for chest pain and palpitations.   Gastrointestinal:  Negative for abdominal pain, constipation, diarrhea, nausea and vomiting.   Genitourinary:  Negative for decreased urine volume, difficulty urinating, dysuria, frequency, hematuria and urgency.   Musculoskeletal:  Negative for back pain, myalgias, neck pain and neck stiffness.   Integumentary:  Negative for rash.   Neurological:  Negative for dizziness, light-headedness and headaches.   Psychiatric/Behavioral: Negative.            Objective     Physical Exam       Assessment and Plan     1. Preventative health care  -     CBC Auto Differential; Future; Expected date: 07/31/2024  -     Comprehensive Metabolic Panel; Future; Expected date: 07/31/2024  -     Lipid Panel; Future; Expected date: 07/31/2024  -     T4, Free; Future; Expected date: 07/31/2024  -     TSH; Future; Expected date: 07/31/2024  -     Hemoglobin A1C; Future; Expected date: 07/31/2024  -     Urinalysis, Reflex to Urine Culture Urine, Clean Catch; Future; Expected date: 07/31/2024    2. Primary hypertension  -     CBC Auto Differential; Future; Expected date: 07/31/2024  -     Comprehensive Metabolic Panel; Future; Expected date: 07/31/2024  -     Lipid Panel; Future; Expected date: 07/31/2024  -     T4, Free; Future; Expected date: 07/31/2024  -     TSH; Future; Expected date: 07/31/2024  -     Hemoglobin A1C; Future; Expected date: 07/31/2024  -     Urinalysis, Reflex to Urine Culture Urine, Clean Catch; Future; Expected date: 07/31/2024  -     amLODIPine (NORVASC) 10 MG tablet; Take 1 tablet (10 mg total) by mouth once daily.   Dispense: 90 tablet; Refill: 3  -     carvediloL (COREG) 6.25 MG tablet; Take 1 tablet (6.25 mg total) by mouth once daily.  Dispense: 180 tablet; Refill: 3  -     triamterene-hydrochlorothiazide 37.5-25 mg (MAXZIDE-25) 37.5-25 mg per tablet; Take 1 tablet by mouth once daily.  Dispense: 90 tablet; Refill: 3    3. Hyperlipidemia, unspecified hyperlipidemia type  Overview:  Statin recommended 8/2023    Orders:  -     CBC Auto Differential; Future; Expected date: 07/31/2024  -     Comprehensive Metabolic Panel; Future; Expected date: 07/31/2024  -     Lipid Panel; Future; Expected date: 07/31/2024  -     T4, Free; Future; Expected date: 07/31/2024  -     TSH; Future; Expected date: 07/31/2024  -     Hemoglobin A1C; Future; Expected date: 07/31/2024  -     Urinalysis, Reflex to Urine Culture Urine, Clean Catch; Future; Expected date: 07/31/2024    4. Pre-diabetes  -     CBC Auto Differential; Future; Expected date: 07/31/2024  -     Comprehensive Metabolic Panel; Future; Expected date: 07/31/2024  -     Lipid Panel; Future; Expected date: 07/31/2024  -     T4, Free; Future; Expected date: 07/31/2024  -     TSH; Future; Expected date: 07/31/2024  -     Hemoglobin A1C; Future; Expected date: 07/31/2024  -     Urinalysis, Reflex to Urine Culture Urine, Clean Catch; Future; Expected date: 07/31/2024    5. Iron deficiency anemia secondary to inadequate dietary iron intake  -     CBC Auto Differential; Future; Expected date: 07/31/2024  -     Comprehensive Metabolic Panel; Future; Expected date: 07/31/2024  -     Lipid Panel; Future; Expected date: 07/31/2024  -     T4, Free; Future; Expected date: 07/31/2024  -     TSH; Future; Expected date: 07/31/2024  -     Hemoglobin A1C; Future; Expected date: 07/31/2024  -     Urinalysis, Reflex to Urine Culture Urine, Clean Catch; Future; Expected date: 07/31/2024    6. Vitamin D insufficiency  -     CBC Auto Differential; Future; Expected date: 07/31/2024  -     Comprehensive  Metabolic Panel; Future; Expected date: 07/31/2024  -     Lipid Panel; Future; Expected date: 07/31/2024  -     T4, Free; Future; Expected date: 07/31/2024  -     TSH; Future; Expected date: 07/31/2024  -     Hemoglobin A1C; Future; Expected date: 07/31/2024  -     Urinalysis, Reflex to Urine Culture Urine, Clean Catch; Future; Expected date: 07/31/2024    7. Psoriasis  -     CBC Auto Differential; Future; Expected date: 07/31/2024  -     Comprehensive Metabolic Panel; Future; Expected date: 07/31/2024  -     Lipid Panel; Future; Expected date: 07/31/2024  -     T4, Free; Future; Expected date: 07/31/2024  -     TSH; Future; Expected date: 07/31/2024  -     Hemoglobin A1C; Future; Expected date: 07/31/2024  -     Urinalysis, Reflex to Urine Culture Urine, Clean Catch; Future; Expected date: 07/31/2024    8. Prolapse of uterus  -     CBC Auto Differential; Future; Expected date: 07/31/2024  -     Comprehensive Metabolic Panel; Future; Expected date: 07/31/2024  -     Lipid Panel; Future; Expected date: 07/31/2024  -     T4, Free; Future; Expected date: 07/31/2024  -     TSH; Future; Expected date: 07/31/2024  -     Hemoglobin A1C; Future; Expected date: 07/31/2024  -     Urinalysis, Reflex to Urine Culture Urine, Clean Catch; Future; Expected date: 07/31/2024    9. Personal history of colonic polyps  -     Ambulatory referral/consult to Endo Procedure ; Future; Expected date: 08/01/2024    10. Class 1 obesity with serious comorbidity and body mass index (BMI) of 33.0 to 33.9 in adult, unspecified obesity type    11. Gastroesophageal reflux disease with esophagitis without hemorrhage  -     pantoprazole (PROTONIX) 40 MG tablet; Take 1 tablet (40 mg total) by mouth once daily.  Dispense: 90 tablet; Refill: 3    Other orders  -     Discontinue: dicyclomine (BENTYL) 20 mg tablet; Take 1 tablet (20 mg total) by mouth every 6 (six) hours as needed (Diarrhea).  Dispense: 30 tablet; Refill: 3  -     dicyclomine  (BENTYL) 20 mg tablet; Take 1 tablet (20 mg total) by mouth every 6 (six) hours as needed (Diarrhea).  Dispense: 30 tablet; Refill: 3  -     potassium chloride (MICRO-K) 8 mEq CpSR; Take 1 capsule (8 mEq total) by mouth once daily.  Dispense: 90 capsule; Refill: 3        1. CBC, CMP, UA, TSH, free T4, fasting lipids, vitamin-D level, and hemoglobin A1c.    2. Continue amlodipine 10 mg daily, carvedilol 6.25 mg daily, triamterene-hydrochlorothiazide 37.5/25 mg daily, and potassium chloride 8 mEq daily.  Hypertension is well controlled.    3. Continue diet and exercise.  Hyperlipidemia and prediabetes are stable.    4. Continue ferrous sulfate 325 mg daily.  Iron-deficiency anemia stable.    5. Continue pantoprazole 40 mg daily.  GERD is stable.    6. Continue over-the-counter vitamin-D 2000 units daily.  Vitamin-D insufficiency is stable.    7. Continue Tremfya as prescribed and continue follow-up with dermatology as scheduled.  Psoriasis is stable.    8. Screening colonoscopy.    9. Encourage diet and exercise.  Weight remains stable.    10. Return to clinic as needed or in 1 year for annual physical exam.               Follow up in about 1 year (around 7/31/2025), or if symptoms worsen or fail to improve, for Annual exam.

## 2024-08-14 ENCOUNTER — HOSPITAL ENCOUNTER (OUTPATIENT)
Dept: RADIOLOGY | Facility: HOSPITAL | Age: 69
Discharge: HOME | End: 2024-08-14
Attending: PHYSICIAN ASSISTANT
Payer: MEDICARE

## 2024-08-14 ENCOUNTER — OFFICE VISIT (OUTPATIENT)
Dept: INTERNAL MEDICINE | Facility: CLINIC | Age: 69
End: 2024-08-14
Payer: MEDICARE

## 2024-08-14 VITALS
WEIGHT: 180.31 LBS | OXYGEN SATURATION: 98 % | DIASTOLIC BLOOD PRESSURE: 74 MMHG | HEART RATE: 52 BPM | BODY MASS INDEX: 34.04 KG/M2 | SYSTOLIC BLOOD PRESSURE: 134 MMHG | HEIGHT: 61 IN

## 2024-08-14 DIAGNOSIS — M79.604 RIGHT LEG PAIN: Primary | ICD-10-CM

## 2024-08-14 DIAGNOSIS — I10 PRIMARY HYPERTENSION: ICD-10-CM

## 2024-08-14 DIAGNOSIS — M25.561 RIGHT KNEE PAIN, UNSPECIFIED CHRONICITY: ICD-10-CM

## 2024-08-14 PROCEDURE — 3008F BODY MASS INDEX DOCD: CPT | Mod: CPTII,S$GLB,, | Performed by: PHYSICIAN ASSISTANT

## 2024-08-14 PROCEDURE — 3044F HG A1C LEVEL LT 7.0%: CPT | Mod: CPTII,S$GLB,, | Performed by: PHYSICIAN ASSISTANT

## 2024-08-14 PROCEDURE — 99999 PR PBB SHADOW E&M-EST. PATIENT-LVL V: CPT | Mod: PBBFAC,,, | Performed by: PHYSICIAN ASSISTANT

## 2024-08-14 PROCEDURE — 1101F PT FALLS ASSESS-DOCD LE1/YR: CPT | Mod: CPTII,S$GLB,, | Performed by: PHYSICIAN ASSISTANT

## 2024-08-14 PROCEDURE — 3288F FALL RISK ASSESSMENT DOCD: CPT | Mod: CPTII,S$GLB,, | Performed by: PHYSICIAN ASSISTANT

## 2024-08-14 PROCEDURE — 1160F RVW MEDS BY RX/DR IN RCRD: CPT | Mod: CPTII,S$GLB,, | Performed by: PHYSICIAN ASSISTANT

## 2024-08-14 PROCEDURE — 3078F DIAST BP <80 MM HG: CPT | Mod: CPTII,S$GLB,, | Performed by: PHYSICIAN ASSISTANT

## 2024-08-14 PROCEDURE — 1125F AMNT PAIN NOTED PAIN PRSNT: CPT | Mod: CPTII,S$GLB,, | Performed by: PHYSICIAN ASSISTANT

## 2024-08-14 PROCEDURE — 3075F SYST BP GE 130 - 139MM HG: CPT | Mod: CPTII,S$GLB,, | Performed by: PHYSICIAN ASSISTANT

## 2024-08-14 PROCEDURE — 73560 X-RAY EXAM OF KNEE 1 OR 2: CPT | Mod: TC,LT

## 2024-08-14 PROCEDURE — 99213 OFFICE O/P EST LOW 20 MIN: CPT | Mod: S$GLB,,, | Performed by: PHYSICIAN ASSISTANT

## 2024-08-14 PROCEDURE — 1159F MED LIST DOCD IN RCRD: CPT | Mod: CPTII,S$GLB,, | Performed by: PHYSICIAN ASSISTANT

## 2024-08-14 RX ORDER — MELOXICAM 7.5 MG/1
7.5 TABLET ORAL DAILY
Qty: 14 TABLET | Refills: 0 | Status: SHIPPED | OUTPATIENT
Start: 2024-08-14

## 2024-08-14 RX ORDER — CYCLOBENZAPRINE HCL 5 MG
5 TABLET ORAL NIGHTLY
Qty: 30 TABLET | Refills: 0 | Status: SHIPPED | OUTPATIENT
Start: 2024-08-14 | End: 2024-09-13

## 2024-08-14 NOTE — PROGRESS NOTES
"Subjective:       Patient ID: Rubia Mathews is a 69 y.o. female.        Chief Complaint: Leg Pain (Right) and Knee Pain (Right)    Rubia Mathews is an established patient of Alexander Moreno MD here today for urgent care visit.    Flew to [place], [place]  Lots of walking  Went to [place] and had to "walk on tippy toes" to alleviate the pain, ultimately had to stop walking due to pain  Got to the point she had to have wheelchair in airport to come home  Since getting home, pain is not as excruciating but still bothersome  Pain is front of knee, sides, and popliteal area  Weight bearing is more painful  Tylenol OTC helps a little, naproxen as well which is more helpful    Recently saw PCP 7/2024 for annual exam.  Past medical history of HTN, lipids, iron def anemia, vitamin d def, and psoriasis on tremfya injections every other month.      Denies chest pain and shortness of breath.           Review of Systems   Constitutional:  Negative for activity change, chills, diaphoresis, fatigue, fever and unexpected weight change.   HENT:  Negative for congestion, hearing loss, rhinorrhea, sore throat and trouble swallowing.    Eyes:  Negative for discharge and visual disturbance.   Respiratory:  Negative for cough, chest tightness, shortness of breath and wheezing.    Cardiovascular:  Negative for chest pain, palpitations and leg swelling.   Gastrointestinal:  Negative for abdominal pain, blood in stool, constipation, diarrhea, nausea and vomiting.   Endocrine: Negative for polydipsia and polyuria.   Genitourinary:  Negative for difficulty urinating, dysuria, frequency, hematuria, menstrual problem and urgency.   Musculoskeletal:  Positive for arthralgias and joint swelling. Negative for back pain and neck pain.   Skin:  Negative for rash.   Neurological:  Negative for dizziness, syncope, weakness and headaches.   Psychiatric/Behavioral:  Negative for confusion, dysphoric mood and sleep disturbance. The " patient is not nervous/anxious.        Objective:      Physical Exam  Vitals and nursing note reviewed.   Constitutional:       Appearance: Normal appearance. She is well-developed.   HENT:      Head: Normocephalic.      Right Ear: External ear normal.      Left Ear: External ear normal.   Eyes:      Pupils: Pupils are equal, round, and reactive to light.   Cardiovascular:      Rate and Rhythm: Normal rate and regular rhythm.      Heart sounds: Normal heart sounds. No murmur heard.     No friction rub. No gallop.   Pulmonary:      Effort: Pulmonary effort is normal. No respiratory distress.      Breath sounds: Normal breath sounds.   Abdominal:      Palpations: Abdomen is soft.      Tenderness: There is no abdominal tenderness.   Musculoskeletal:      Right knee: Crepitus present. No effusion or erythema. Normal range of motion. Tenderness (popliteal, anterior, lateral, and medial, entire knee) present. Normal pulse.   Skin:     General: Skin is warm and dry.   Neurological:      Mental Status: She is alert.         Assessment:       1. Right leg pain    2. Right knee pain, unspecified chronicity    3. Primary hypertension        Plan:       Rubia was seen today for leg pain and knee pain.    Diagnoses and all orders for this visit:    Right leg pain  -     US Lower Extremity Veins Right; Future  -     cyclobenzaprine (FLEXERIL) 5 MG tablet; Take 1 tablet (5 mg total) by mouth nightly.    Right knee pain, unspecified chronicity  -     X-ray Knee Ortho Right; Future  -     meloxicam (MOBIC) 7.5 MG tablet; Take 1 tablet (7.5 mg total) by mouth once daily.    Primary hypertension - stable and controlled, continue current regimen  BP Readings from Last 5 Encounters:   08/14/24 134/74   07/31/24 128/74   05/02/24 118/72   07/31/23 132/72   07/19/23 130/70      Pt has been given instructions populated from patient instructions database and has verbalized understanding of the after visit summary and information  "contained wherein.    Follow up with a primary care provider. May go to ER for acute shortness of breath, lightheadedness, fever, or any other emergent complaints or changes in condition.    "This note will be shared with the patient"    Future Appointments   Date Time Provider Department Center   8/14/2024 10:15 AM [facility] XRIM1 485 LB LIMIT [facility] IM Adiel Choe [facility]   8/15/2024  8:45 AM [facility] OIC US1 [facility] ULTRSND [place]                 "

## 2024-09-03 ENCOUNTER — OFFICE VISIT (OUTPATIENT)
Dept: ORTHOPEDICS | Facility: CLINIC | Age: 69
End: 2024-09-03
Payer: MEDICARE

## 2024-09-03 DIAGNOSIS — M25.461 KNEE EFFUSION, RIGHT: Primary | ICD-10-CM

## 2024-09-03 PROCEDURE — 20610 DRAIN/INJ JOINT/BURSA W/O US: CPT | Mod: RT,S$GLB,, | Performed by: ORTHOPAEDIC SURGERY

## 2024-09-03 PROCEDURE — 1160F RVW MEDS BY RX/DR IN RCRD: CPT | Mod: CPTII,S$GLB,, | Performed by: ORTHOPAEDIC SURGERY

## 2024-09-03 PROCEDURE — 3044F HG A1C LEVEL LT 7.0%: CPT | Mod: CPTII,S$GLB,, | Performed by: ORTHOPAEDIC SURGERY

## 2024-09-03 PROCEDURE — 1159F MED LIST DOCD IN RCRD: CPT | Mod: CPTII,S$GLB,, | Performed by: ORTHOPAEDIC SURGERY

## 2024-09-03 PROCEDURE — 99999 PR PBB SHADOW E&M-EST. PATIENT-LVL II: CPT | Mod: PBBFAC,,, | Performed by: ORTHOPAEDIC SURGERY

## 2024-09-03 PROCEDURE — 99213 OFFICE O/P EST LOW 20 MIN: CPT | Mod: 25,S$GLB,, | Performed by: ORTHOPAEDIC SURGERY

## 2024-09-03 RX ORDER — TRIAMCINOLONE ACETONIDE 40 MG/ML
40 INJECTION, SUSPENSION INTRA-ARTICULAR; INTRAMUSCULAR
Status: DISCONTINUED | OUTPATIENT
Start: 2024-09-03 | End: 2024-09-03 | Stop reason: HOSPADM

## 2024-09-03 RX ADMIN — TRIAMCINOLONE ACETONIDE 40 MG: 40 INJECTION, SUSPENSION INTRA-ARTICULAR; INTRAMUSCULAR at 01:09

## 2024-09-03 NOTE — PROCEDURES
Large Joint Aspiration/Injection: R knee    Date/Time: 9/3/2024 1:45 PM    Performed by: John Martinez MD  Authorized by: John Martinez MD    Consent Done?:  Yes (Verbal)  Indications:  Joint swelling  Site marked: the procedure site was marked    Timeout: prior to procedure the correct patient, procedure, and site was verified    Prep: patient was prepped and draped in usual sterile fashion      Local anesthesia used?: Yes    Anesthesia:  Local infiltration  Local anesthetic:  Topical anesthetic and lidocaine 1% without epinephrine  Anesthetic total (ml):  14      Details:  Needle Size:  22 G and 18 G  Ultrasonic Guidance for needle placement?: No    Approach:  Lateral  Location:  Knee  Site:  R knee  Medications:  40 mg triamcinolone acetonide 40 mg/mL  Aspirate amount (mL):  50  Aspirate:  Clear  Patient tolerance:  Patient tolerated the procedure well with no immediate complications     A mixture containing 4 cc of 1% lidocaine and 1 cc of Kenalog (40mg/cc) was injected.

## 2024-09-03 NOTE — PROGRESS NOTES
Patient ID:   Rubia Mathews is a 69 y.o. female.    Chief Complaint:   Right knee pain and swelling    HPI:   The patient is a 69-year-old female who was recently on a trip in [place] when she started to develop increasing pain and swelling in her right knee.  In addition, she started to develop pain in her right calf.  She was visiting [place] and [facility].  The pain worsened the more that she walked.  The pain got so bad that she had to use a wheelchair on her way back in the airport.  Today, she reports 2/10 pain.  She reports swelling in the right knee.  She also has pain in the calf.  She was recently evaluated in the urgent care center.  Ultrasound exam was negative for thromboembolism.  She denies a history of gout.  She denies any vascular history.  She does have a history of psoriasis.    Medications:    Current Outpatient Medications:     albuterol (PROVENTIL HFA) 90 mcg/actuation inhaler, Inhale 2 puffs into the lungs every 6 (six) hours as needed for Shortness of Breath or Wheezing. Rescue, Disp: 6.7 g, Rfl: 0    albuterol (PROVENTIL/VENTOLIN HFA) 90 mcg/actuation inhaler, INHALE 2 PUFFS BY MOUTH EVERY 6 HOURS AS NEEDED FOR WHEEZING, Disp: 18 g, Rfl: 3    amLODIPine (NORVASC) 10 MG tablet, Take 1 tablet (10 mg total) by mouth once daily., Disp: 90 tablet, Rfl: 3    carvediloL (COREG) 6.25 MG tablet, Take 1 tablet (6.25 mg total) by mouth once daily., Disp: 180 tablet, Rfl: 3    cyclobenzaprine (FLEXERIL) 5 MG tablet, Take 1 tablet (5 mg total) by mouth nightly., Disp: 30 tablet, Rfl: 0    estradioL (ESTRACE) 0.01 % (0.1 mg/gram) vaginal cream, SMARTSIG:Gram(s) Vaginal Every Night (Patient not taking: Reported on 7/31/2024), Disp: , Rfl:     meloxicam (MOBIC) 7.5 MG tablet, Take 1 tablet (7.5 mg total) by mouth once daily., Disp: 14 tablet, Rfl: 0    pantoprazole (PROTONIX) 40 MG tablet, Take 1 tablet (40 mg total) by mouth once daily., Disp: 90 tablet, Rfl: 3    potassium chloride  (MICRO-K) 8 mEq CpSR, Take 1 capsule (8 mEq total) by mouth once daily., Disp: 90 capsule, Rfl: 3    TREMFYA 100 mg/mL AtIn, Inject as directed. Every 2 months, Disp: , Rfl:     triamterene-hydrochlorothiazide 37.5-25 mg (MAXZIDE-25) 37.5-25 mg per tablet, Take 1 tablet by mouth once daily., Disp: 90 tablet, Rfl: 3    Allergies:  Review of patient's allergies indicates:   Allergen Reactions    Losartan Hives, Swelling and Rash    Ciprofloxacin      visual disturbance    Lisinopril      coughing    Zithromax [azithromycin] Diarrhea       Past Medical History:  Past Medical History:   Diagnosis Date    Adjustment disorder with mixed anxiety and depressed mood     Diverticulitis of colon (without mention of hemorrhage)(562.11)     Diverticulosis of colon (without mention of hemorrhage)     HLD (hyperlipidemia)     HTN (hypertension)     Lumbago     Mitral valve disorder     Mitral valve disorders(424.0)     Pain in joint, shoulder region     Sarcoidosis         Past Surgical History:  Past Surgical History:   Procedure Laterality Date    CATARACT EXTRACTION W/  INTRAOCULAR LENS IMPLANT Right 03/29/2018    Dr. Ferrari    CATARACT EXTRACTION W/  INTRAOCULAR LENS IMPLANT Left 08/2018    COLONOSCOPY N/A 6/3/2021    Procedure: COLONOSCOPY;  Surgeon: Evelia Dorsey MD;  Location: [facility];  Service: Endoscopy;  Laterality: N/A;    ESOPHAGOGASTRODUODENOSCOPY N/A 7/6/2021    Procedure: EGD (ESOPHAGOGASTRODUODENOSCOPY);  Surgeon: Evelia Dorsey MD;  Location: [facility];  Service: Endoscopy;  Laterality: N/A;    HYSTERECTOMY      TOTAL ABDOMINAL HYSTERECTOMY W/ BILATERAL SALPINGOOPHORECTOMY      vocal cord noduules         Social History:  Social History     Occupational History     Employer: [employer]   Tobacco Use    Smoking status: Every Day     Types: Cigarettes     Passive exposure: Current    Smokeless tobacco: Not on file    Tobacco comments:     a pack/week-trying to quit. Not always smoking daily     Substance and Sexual Activity    Alcohol use: Yes     Comment: Social - Drinks wine     Drug use: No    Sexual activity: Yes     Partners: Male       Family History:  Family History   Problem Relation Name Age of Onset    Diabetes Mother          - 1933    Hypertension Mother          lives alone, pays bills, independent    Heart attack Mother  85        s/p stent    Cancer Father          d. 78 lymphoma    Hypertension Sister Diamond     Hypertension Sister Drea     Transient ischemic attack Sister Drea         no residual effects    Hypertension Brother Peña     Arthritis Brother Peña         s/p knee replacement    Kidney failure Son Felix         s/p kidney transplant, multiple complications    No Known Problems Grandchild Alie          2021    Amblyopia Neg Hx      Blindness Neg Hx      Cataracts Neg Hx      Glaucoma Neg Hx      Macular degeneration Neg Hx      Retinal detachment Neg Hx      Strabismus Neg Hx      Stroke Neg Hx      Thyroid disease Neg Hx          ROS:  Review of Systems   Musculoskeletal:  Positive for joint pain, joint swelling, myalgias and stiffness.   All other systems reviewed and are negative.      Vitals:  LMP  (LMP Unknown)     Physical Examination:  Comprehensive Orthopaedic Musculoskeletal Exam    General      Constitutional: appears stated age, moderately obese, well-developed and well-nourished    Scleral icterus: no    Labored breathing: no    Psychiatric: normal mood and affect and no acute distress    Neurological: alert and oriented x3    Skin: intact    Lymphadenopathy: none     Ortho Exam   Right knee exam:   She has a large tense effusion in the right knee.  There is tenderness along the medial joint line.  The knee is stable to varus and valgus stress, Lachman's, posterior drawer.    Range of motion is from 0 to about 120.  This is a 2+ dorsalis pedis pulse in the right foot    Imaging:  I have independently reviewed the following imaging  studies performed at [facility]:    Right knee x-ray series demonstrates perhaps mild narrowing of the medial compartment otherwise they are unremarkable.      US Lower Extremity Veins Right  Narrative: EXAMINATION:  US LOWER EXTREMITY VEINS RIGHT    CLINICAL HISTORY:  Pain in right leg    TECHNIQUE:  Duplex and color flow Doppler evaluation and graded compression of the right lower extremity veins was performed.    COMPARISON:  None    FINDINGS:  Right thigh veins: The common femoral, femoral, popliteal, upper greater saphenous, and deep femoral veins are patent and free of thrombus. The veins are normally compressible and have normal phasic flow and augmentation response.    Right calf veins: The visualized calf veins are patent.    Contralateral CFV: The contralateral (left) common femoral vein is patent and free of thrombus.    Miscellaneous: None  Impression: No evidence of deep venous thrombosis in the right lower extremity.    Electronically signed by: DAVID Gastelum MD  Date:    08/15/2024  Time:    09:08    Assessment:  1. Knee effusion, right      Plan:  I reviewed the recent x-rays that were performed.  I discussed management options of her right knee effusion.  I discussed aspiration and corticosteroid injection.  She wishes to proceed.  She will return as needed.     No follow-ups on file.

## 2024-09-18 ENCOUNTER — TELEPHONE (OUTPATIENT)
Dept: ORTHOPEDICS | Facility: CLINIC | Age: 69
End: 2024-09-18
Payer: MEDICARE

## 2024-09-24 ENCOUNTER — NURSE TRIAGE (OUTPATIENT)
Dept: ADMINISTRATIVE | Facility: CLINIC | Age: 69
End: 2024-09-24
Payer: MEDICARE

## 2024-09-24 ENCOUNTER — OFFICE VISIT (OUTPATIENT)
Dept: UROLOGY | Facility: CLINIC | Age: 69
End: 2024-09-24
Payer: MEDICARE

## 2024-09-24 VITALS
SYSTOLIC BLOOD PRESSURE: 123 MMHG | HEART RATE: 69 BPM | HEIGHT: 61 IN | DIASTOLIC BLOOD PRESSURE: 76 MMHG | BODY MASS INDEX: 33.96 KG/M2 | WEIGHT: 179.88 LBS

## 2024-09-24 DIAGNOSIS — N81.10 BADEN-WALKER GRADE 4 CYSTOCELE: ICD-10-CM

## 2024-09-24 DIAGNOSIS — R35.0 URINARY FREQUENCY: Primary | ICD-10-CM

## 2024-09-24 DIAGNOSIS — N39.41 URGE URINARY INCONTINENCE: ICD-10-CM

## 2024-09-24 DIAGNOSIS — R39.15 URINARY URGENCY: ICD-10-CM

## 2024-09-24 DIAGNOSIS — R30.0 DYSURIA: ICD-10-CM

## 2024-09-24 DIAGNOSIS — R35.1 NOCTURIA: ICD-10-CM

## 2024-09-24 LAB — POC RESIDUAL URINE VOLUME: 32 ML (ref 0–100)

## 2024-09-24 PROCEDURE — 3044F HG A1C LEVEL LT 7.0%: CPT | Mod: CPTII,S$GLB,, | Performed by: NURSE PRACTITIONER

## 2024-09-24 PROCEDURE — 3008F BODY MASS INDEX DOCD: CPT | Mod: CPTII,S$GLB,, | Performed by: NURSE PRACTITIONER

## 2024-09-24 PROCEDURE — 51798 US URINE CAPACITY MEASURE: CPT | Mod: S$GLB,,, | Performed by: NURSE PRACTITIONER

## 2024-09-24 PROCEDURE — 3078F DIAST BP <80 MM HG: CPT | Mod: CPTII,S$GLB,, | Performed by: NURSE PRACTITIONER

## 2024-09-24 PROCEDURE — 99999 PR PBB SHADOW E&M-EST. PATIENT-LVL V: CPT | Mod: PBBFAC,,, | Performed by: NURSE PRACTITIONER

## 2024-09-24 PROCEDURE — 1126F AMNT PAIN NOTED NONE PRSNT: CPT | Mod: CPTII,S$GLB,, | Performed by: NURSE PRACTITIONER

## 2024-09-24 PROCEDURE — 3288F FALL RISK ASSESSMENT DOCD: CPT | Mod: CPTII,S$GLB,, | Performed by: NURSE PRACTITIONER

## 2024-09-24 PROCEDURE — 1101F PT FALLS ASSESS-DOCD LE1/YR: CPT | Mod: CPTII,S$GLB,, | Performed by: NURSE PRACTITIONER

## 2024-09-24 PROCEDURE — 99204 OFFICE O/P NEW MOD 45 MIN: CPT | Mod: S$GLB,,, | Performed by: NURSE PRACTITIONER

## 2024-09-24 PROCEDURE — 3074F SYST BP LT 130 MM HG: CPT | Mod: CPTII,S$GLB,, | Performed by: NURSE PRACTITIONER

## 2024-09-24 RX ORDER — SODIUM CHLORIDE 9 MG/ML
INJECTION, SOLUTION INTRAVENOUS CONTINUOUS
OUTPATIENT
Start: 2024-09-24

## 2024-09-24 RX ORDER — LIDOCAINE HYDROCHLORIDE 20 MG/ML
JELLY TOPICAL ONCE
OUTPATIENT
Start: 2024-09-24 | End: 2024-09-24

## 2024-09-24 NOTE — TELEPHONE ENCOUNTER
No longer needs assistance. Caller utilized patient portal. Advised the patient to call back with any questions or concerns.        Reason for Disposition   Caller has cancelled the call before the first contact    Protocols used: No Contact or Duplicate Contact Call-A-[place]

## 2024-09-24 NOTE — PATIENT INSTRUCTIONS
Schedule patient for cystoscopy with urodynamics study by Dr. Arzola.   Surgery team will call you to select surgery date and discuss pre-op instructions.   Pre-op labs needed (CBC, BMP, U/A, and urine cx). U/A and urine cx (home collect; drop urine specimen off to any [facility] outpatient lab).  Random bladder scan today  Surgery packet provided to patient.   Follow-up post-op.

## 2024-09-24 NOTE — PROGRESS NOTES
Subjective:       Patient ID: Rubia Mathews is a 69 y.o. female.    Chief Complaint: Urinary Frequency, Prolapsed Bladder, and Urinary Incontinence    Patient is new to me. She is a 70 yo AAF who is here today for evaluation of prolapsed bladder. She was diagnosed within the past year by urogyn at [facility] with vaginal exam. Patient denies urodynamics being performed. Her surgical hx includes  and hysterectomy. In the last 2 months patient has been experiencing urinary frequency, urgency with leakage sometimes, nocturia, and dysuria. We discussed cysto with urodynamics and patient would like to move forward with study.      Urinary Frequency   This is a new problem. Episode onset: 2 months ago. The problem has been gradually worsening. The quality of the pain is described as burning. The pain is mild. There has been no fever. There is No history of pyelonephritis. Associated symptoms include frequency and urgency (with only a little urine being voided). Pertinent negatives include no chills, flank pain, hematuria, nausea or vomiting. She has tried nothing for the symptoms. Her past medical history is significant for hypertension. There is no history of diabetes mellitus, kidney stones, recurrent UTIs, a single kidney or a urological procedure.     Review of Systems   Constitutional:  Negative for chills and fever.   Gastrointestinal:  Negative for abdominal pain, change in bowel habit, nausea and vomiting.   Genitourinary:  Positive for dysuria, frequency, nocturia (x3) and urgency (with only a little urine being voided). Negative for decreased urine volume, flank pain and hematuria.   Musculoskeletal:  Positive for back pain (chronic lower back pain).   Psychiatric/Behavioral: Negative.           Objective:      Physical Exam  Vitals and nursing note reviewed.   Constitutional:       Appearance: She is well-developed.   HENT:      Head: Normocephalic and atraumatic.   Eyes:      Pupils:  Pupils are equal, round, and reactive to light.   Cardiovascular:      Rate and Rhythm: Normal rate and regular rhythm.      Heart sounds: Normal heart sounds.   Pulmonary:      Effort: Pulmonary effort is normal. No respiratory distress.      Breath sounds: Normal breath sounds.   Abdominal:      General: Bowel sounds are normal.      Palpations: Abdomen is soft.      Tenderness: There is no abdominal tenderness.   Genitourinary:     Comments: Random bladder scan = 32 mL (Patient voided approximately 30 minutes ago).   Musculoskeletal:         General: Normal range of motion.      Cervical back: Normal range of motion and neck supple.   Lymphadenopathy:      Cervical: No cervical adenopathy.   Skin:     General: Skin is warm and dry.   Neurological:      Mental Status: She is alert and oriented to person, place, and time.      Coordination: Coordination normal.   Psychiatric:         Behavior: Behavior normal.         Thought Content: Thought content normal.         Judgment: Judgment normal.         Assessment:       Problem List Items Addressed This Visit    None  Visit Diagnoses       Urinary frequency    -  Primary    Relevant Orders    Urine culture    Urinalysis    POCT Bladder Scan    Urinary urgency        Relevant Orders    Urine culture    Urinalysis    POCT Bladder Scan    Urge urinary incontinence        Relevant Orders    Urine culture    Urinalysis    POCT Bladder Scan    Nocturia        Relevant Orders    Urine culture    Urinalysis    POCT Bladder Scan    Dysuria        Relevant Orders    Urine culture    Urinalysis    POCT Bladder Scan            Plan:           Rubia was seen today for urinary frequency, prolapsed bladder and urinary incontinence.    Diagnoses and all orders for this visit:    Urinary frequency  -     Urine culture; Future  -     Urinalysis; Future  -     POCT Bladder Scan  -     Case Request Operating Room: CYSTOSCOPY, WITH URODYNAMIC TESTING  -     Vital Signs ; Standing  -      Notify physician ; Standing  -     Diet NPO; Standing  -     Place in Outpatient; Standing  -     Place PIEDAD hose; Standing  -     Diet NPO  -     CBC Auto Differential; Future  -     Basic Metabolic Panel; Future    Urinary urgency  -     Urine culture; Future  -     Urinalysis; Future  -     POCT Bladder Scan  -     Case Request Operating Room: CYSTOSCOPY, WITH URODYNAMIC TESTING  -     Vital Signs ; Standing  -     Notify physician ; Standing  -     Diet NPO; Standing  -     Place in Outpatient; Standing  -     Place PIEDAD hose; Standing  -     Diet NPO  -     CBC Auto Differential; Future  -     Basic Metabolic Panel; Future    Urge urinary incontinence  -     Urine culture; Future  -     Urinalysis; Future  -     POCT Bladder Scan  -     Case Request Operating Room: CYSTOSCOPY, WITH URODYNAMIC TESTING  -     Vital Signs ; Standing  -     Notify physician ; Standing  -     Diet NPO; Standing  -     Place in Outpatient; Standing  -     Place PIEDAD hose; Standing  -     Diet NPO  -     CBC Auto Differential; Future  -     Basic Metabolic Panel; Future    Nocturia  -     Urine culture; Future  -     Urinalysis; Future  -     POCT Bladder Scan  -     Case Request Operating Room: CYSTOSCOPY, WITH URODYNAMIC TESTING  -     Vital Signs ; Standing  -     Notify physician ; Standing  -     Diet NPO; Standing  -     Place in Outpatient; Standing  -     Place PIEDAD hose; Standing  -     Diet NPO  -     CBC Auto Differential; Future  -     Basic Metabolic Panel; Future    Dysuria  -     Urine culture; Future  -     Urinalysis; Future  -     POCT Bladder Scan  -     CBC Auto Differential; Future  -     Basic Metabolic Panel; Future    Dupont-Walker grade 4 cystocele  -     Case Request Operating Room: CYSTOSCOPY, WITH URODYNAMIC TESTING  -     Vital Signs ; Standing  -     Notify physician ; Standing  -     Diet NPO; Standing  -     Place in Outpatient; Standing  -     Place PIEDAD hose; Standing  -     Diet NPO  -     CBC Auto  Differential; Future  -     Basic Metabolic Panel; Future    Other orders  -     0.9%  NaCl infusion  -     IP VTE LOW RISK PATIENT; Standing  -     cefTRIAXone (Rocephin) 1 g in D5W 100 mL IVPB (MB+)  -     LIDOcaine HCl 2% urojet    Schedule patient for cystoscopy with urodynamics study by Dr. Arzola.   Surgery team will call you to select surgery date and discuss pre-op instructions.   Pre-op labs needed (CBC, BMP, U/A, and urine cx). U/A and urine cx (home collect; drop urine specimen off to any [facility] outpatient lab).  Random bladder scan today  Surgery packet provided to patient.     Follow-up post-op.     Benson Olvera, DNP

## 2024-09-25 ENCOUNTER — OFFICE VISIT (OUTPATIENT)
Dept: ORTHOPEDICS | Facility: CLINIC | Age: 69
End: 2024-09-25
Payer: MEDICARE

## 2024-09-25 VITALS — WEIGHT: 180.31 LBS | HEIGHT: 61 IN | BODY MASS INDEX: 34.04 KG/M2

## 2024-09-25 DIAGNOSIS — M25.561 ACUTE PAIN OF RIGHT KNEE: Primary | ICD-10-CM

## 2024-09-25 PROCEDURE — 1159F MED LIST DOCD IN RCRD: CPT | Mod: CPTII,S$GLB,, | Performed by: ORTHOPAEDIC SURGERY

## 2024-09-25 PROCEDURE — 3288F FALL RISK ASSESSMENT DOCD: CPT | Mod: CPTII,S$GLB,, | Performed by: ORTHOPAEDIC SURGERY

## 2024-09-25 PROCEDURE — 99213 OFFICE O/P EST LOW 20 MIN: CPT | Mod: S$GLB,,, | Performed by: ORTHOPAEDIC SURGERY

## 2024-09-25 PROCEDURE — 3044F HG A1C LEVEL LT 7.0%: CPT | Mod: CPTII,S$GLB,, | Performed by: ORTHOPAEDIC SURGERY

## 2024-09-25 PROCEDURE — 1125F AMNT PAIN NOTED PAIN PRSNT: CPT | Mod: CPTII,S$GLB,, | Performed by: ORTHOPAEDIC SURGERY

## 2024-09-25 PROCEDURE — 99999 PR PBB SHADOW E&M-EST. PATIENT-LVL III: CPT | Mod: PBBFAC,,, | Performed by: ORTHOPAEDIC SURGERY

## 2024-09-25 PROCEDURE — 3008F BODY MASS INDEX DOCD: CPT | Mod: CPTII,S$GLB,, | Performed by: ORTHOPAEDIC SURGERY

## 2024-09-25 PROCEDURE — 1160F RVW MEDS BY RX/DR IN RCRD: CPT | Mod: CPTII,S$GLB,, | Performed by: ORTHOPAEDIC SURGERY

## 2024-09-25 PROCEDURE — 1101F PT FALLS ASSESS-DOCD LE1/YR: CPT | Mod: CPTII,S$GLB,, | Performed by: ORTHOPAEDIC SURGERY

## 2024-09-25 NOTE — PROGRESS NOTES
Patient ID:   Rubia Mathews is a 69 y.o. female.    Chief Complaint:   Follow-up evaluation for right knee effusion    HPI:   The patient is a 69-year-old female who is following up today after recently undergoing aspiration of the right knee and injection with corticosteroid.  At her initial evaluation, she had just returned from her trip in which she had a significant amount of swelling develop in the right knee.  Her swelling and pain are both improved.  Now she is reporting some burning pain over the anterior aspect of the knee.  This is generalized throughout the anterior aspect of the knee.  She denies any injury since our last visit.    Medications:    Current Outpatient Medications:     acetaminophen (TYLENOL) 500 MG tablet, Take 1,000 mg by mouth as needed for Pain., Disp: , Rfl:     albuterol (PROVENTIL HFA) 90 mcg/actuation inhaler, Inhale 2 puffs into the lungs every 6 (six) hours as needed for Shortness of Breath or Wheezing. Rescue (Patient taking differently: Inhale 2 puffs into the lungs as needed for Shortness of Breath or Wheezing. Rescue), Disp: 6.7 g, Rfl: 0    albuterol (PROVENTIL/VENTOLIN HFA) 90 mcg/actuation inhaler, INHALE 2 PUFFS BY MOUTH EVERY 6 HOURS AS NEEDED FOR WHEEZING, Disp: 18 g, Rfl: 3    amLODIPine (NORVASC) 10 MG tablet, Take 1 tablet (10 mg total) by mouth once daily. (Patient taking differently: Take 10 mg by mouth every evening.), Disp: 90 tablet, Rfl: 3    carvediloL (COREG) 6.25 MG tablet, Take 1 tablet (6.25 mg total) by mouth once daily. (Patient taking differently: Take 6.25 mg by mouth 2 (two) times daily.), Disp: 180 tablet, Rfl: 3    cyclobenzaprine (FLEXERIL) 5 MG tablet, Take 5 mg by mouth as needed for Muscle spasms., Disp: , Rfl:     meloxicam (MOBIC) 7.5 MG tablet, Take 1 tablet (7.5 mg total) by mouth once daily. (Patient taking differently: Take 7.5 mg by mouth once daily. Ran out; took last pill 9/23/24; no current refill), Disp: 14 tablet, Rfl: 0     pantoprazole (PROTONIX) 40 MG tablet, Take 1 tablet (40 mg total) by mouth once daily. (Patient taking differently: Take 40 mg by mouth as needed.), Disp: 90 tablet, Rfl: 3    potassium chloride (MICRO-K) 8 mEq CpSR, Take 1 capsule (8 mEq total) by mouth once daily. (Patient taking differently: Take 8 mEq by mouth 3 (three) times a week. 2-3 times weekly; instructed to hold for sx), Disp: 90 capsule, Rfl: 3    TREMFYA 100 mg/mL AtIn, Inject 100 mg as directed. Every 2 months, Disp: , Rfl:     triamterene-hydrochlorothiazide 37.5-25 mg (MAXZIDE-25) 37.5-25 mg per tablet, Take 1 tablet by mouth once daily. (Patient taking differently: Take 1 tablet by mouth once daily. Instructed to hold am of sx), Disp: 90 tablet, Rfl: 3    Allergies:  Review of patient's allergies indicates:   Allergen Reactions    Losartan Hives, Swelling and Rash    Ciprofloxacin      visual disturbance    Lisinopril      coughing    Zithromax [azithromycin] Diarrhea       Past Medical History:  Past Medical History:   Diagnosis Date    Anemia     COVID-19     Diverticulitis of colon (without mention of hemorrhage)(562.11)     Effusion, right knee     Female bladder prolapse     urinary urgency, frequency, incontinence    GERD (gastroesophageal reflux disease)     HLD (hyperlipidemia)     borderline-no meds    HTN (hypertension)     Lumbago     Mitral valve disorders(424.0)     pt unaware of    Pain in joint, shoulder region     left arm pain    Prediabetes     no meds    Psoriasis     Right knee pain     Sarcoidosis     Wheezing     given inhaler but only uses as needed; h/o bronchitis        Past Surgical History:  Past Surgical History:   Procedure Laterality Date    CATARACT EXTRACTION W/  INTRAOCULAR LENS IMPLANT Right 03/29/2018    Dr. Ferrari    CATARACT EXTRACTION W/  INTRAOCULAR LENS IMPLANT Left 08/2018    COLONOSCOPY N/A 06/03/2021    Procedure: COLONOSCOPY;  Surgeon: Evelia Dorsey MD;  Location: [facility];  Service: Endoscopy;   "Laterality: N/A;    COLONOSCOPY      multiple prior to     ESOPHAGOGASTRODUODENOSCOPY N/A 2021    Procedure: EGD (ESOPHAGOGASTRODUODENOSCOPY);  Surgeon: Evelia Dorsey MD;  Location: [facility];  Service: Endoscopy;  Laterality: N/A;    HERNIA REPAIR      as a child    HYSTERECTOMY      vocal cord noduules      WISDOM TOOTH EXTRACTION         Social History:  Social History     Occupational History     Employer: [employer]   Tobacco Use    Smoking status: Some Days     Types: Cigarettes     Passive exposure: Current    Smokeless tobacco: Never    Tobacco comments:     a pack/week-trying to quit. Not always smoking daily    Substance and Sexual Activity    Alcohol use: Yes     Comment: Social - Drinks wine     Drug use: Never    Sexual activity: Not Currently     Partners: Male       Family History:  Family History   Problem Relation Name Age of Onset    Diabetes Mother          - 1933    Hypertension Mother          lives alone, pays bills, independent    Heart attack Mother  85        s/p stent    Cancer Father          d. 78 lymphoma    Hypertension Sister Diamond     Hypertension Sister Drea     Transient ischemic attack Sister Drea         no residual effects    Hypertension Brother Peña     Arthritis Brother Peña         s/p knee replacement    Kidney failure Son Felix         s/p kidney transplant, multiple complications    No Known Problems Grandchild Alie          2021    Amblyopia Neg Hx      Blindness Neg Hx      Cataracts Neg Hx      Glaucoma Neg Hx      Macular degeneration Neg Hx      Retinal detachment Neg Hx      Strabismus Neg Hx      Stroke Neg Hx      Thyroid disease Neg Hx          ROS:  ROS    Vitals:  Ht 5' 1" (1.549 m)   Wt 81.8 kg (180 lb 5.4 oz)   LMP  (LMP Unknown)   BMI 34.07 kg/m²     Physical Examination:  Comprehensive Orthopaedic Musculoskeletal Exam   Ortho Exam   Right knee exam:  No effusion.    Minimal tenderness to palpation of " the mediolateral joint lines.    Range of motion 0-135.    The knee is stable.    Imaging:  I have ordered and independently reviewed the following imaging studies performed at [facility]    EXAMINATION:  XR KNEE ORTHO RIGHT     CLINICAL HISTORY:  Pain in right knee     TECHNIQUE:  AP standing of both knees, Merchant views of both knees as well as a lateral view of the right knee were performed.     COMPARISON:  None     FINDINGS:  The medial tibiofemoral joint spaces are slightly narrowed bilaterally.  No fracture or dislocation.  No bone destruction identified     Impression:     See above        Electronically signed by:Osmin Connell MD  Date:                                            08/14/2024  Time:                                           10:31    Assessment:  1. Acute pain of right knee      Plan:  The knee looks much better since she has had it aspirated and injected.  I have advised continued observation.  I have suggested use of Voltaren gel.  She will return if she does not see continued improvement in the knee.     No follow-ups on file.

## 2024-09-27 ENCOUNTER — TELEPHONE (OUTPATIENT)
Dept: UROLOGY | Facility: CLINIC | Age: 69
End: 2024-09-27
Payer: MEDICARE

## 2024-09-27 NOTE — TELEPHONE ENCOUNTER
----- Message from Benson Olvera DNP sent at 9/26/2024  7:34 PM CDT -----  Please inform patient via telephone that her urine cx was normal. She does not have a UTI. Continue to move forward with cysto with UDS with Dr. Arzola.

## 2024-09-30 ENCOUNTER — TELEPHONE (OUTPATIENT)
Dept: UROLOGY | Facility: CLINIC | Age: 69
End: 2024-09-30
Payer: MEDICARE

## 2024-09-30 PROBLEM — R39.15 URINARY URGENCY: Status: ACTIVE | Noted: 2024-09-30

## 2024-09-30 PROBLEM — R35.1 NOCTURIA: Status: ACTIVE | Noted: 2024-09-30

## 2024-09-30 PROBLEM — N81.10 BADEN-WALKER GRADE 4 CYSTOCELE: Status: ACTIVE | Noted: 2024-09-30

## 2024-09-30 PROBLEM — R35.0 URINARY FREQUENCY: Status: ACTIVE | Noted: 2024-09-30

## 2024-09-30 PROBLEM — N39.41 URGE URINARY INCONTINENCE: Status: ACTIVE | Noted: 2024-09-30

## 2024-09-30 NOTE — TELEPHONE ENCOUNTER
----- Message from LANCE Deras sent at 9/30/2024 12:02 PM CDT -----  Follow Up/Patient Instructions:  Will schedule patient for repair of cystocele on October 21st at [facility].  The patient follow up with us after that.

## 2024-10-01 ENCOUNTER — TELEPHONE (OUTPATIENT)
Dept: UROLOGY | Facility: CLINIC | Age: 69
End: 2024-10-01
Payer: MEDICARE

## 2024-10-01 NOTE — TELEPHONE ENCOUNTER
Left VM for patient indicating that we need to repeat urine culture before surgery on 10/21/24. Left my callback number and requested return phone call to arrange.

## 2024-10-11 ENCOUNTER — PATIENT MESSAGE (OUTPATIENT)
Dept: UROLOGY | Facility: CLINIC | Age: 69
End: 2024-10-11
Payer: MEDICARE

## 2024-10-21 ENCOUNTER — NURSE TRIAGE (OUTPATIENT)
Dept: ADMINISTRATIVE | Facility: CLINIC | Age: 69
End: 2024-10-21
Payer: MEDICARE

## 2024-10-21 PROBLEM — N81.10 PELVIC ORGAN PROLAPSE QUANTIFICATION STAGE 4 CYSTOCELE: Status: ACTIVE | Noted: 2024-10-21

## 2024-10-21 PROBLEM — N81.4 CYSTOCELE WITH PROLAPSE: Status: ACTIVE | Noted: 2024-10-21

## 2024-10-23 ENCOUNTER — TELEPHONE (OUTPATIENT)
Dept: UROLOGY | Facility: CLINIC | Age: 69
End: 2024-10-23
Payer: MEDICARE

## 2024-10-23 ENCOUNTER — PATIENT MESSAGE (OUTPATIENT)
Dept: UROLOGY | Facility: CLINIC | Age: 69
End: 2024-10-23
Payer: MEDICARE

## 2024-10-23 NOTE — TELEPHONE ENCOUNTER
----- Message from Aman Arzola MD sent at 10/23/2024  3:55 PM CDT -----  Regarding: RE: medication  Patient had visual disturbances once when on Cipro.  Patient not having the symptoms at this time.  Have discussed with patient and have continued with Cipro  ----- Message -----  From: Jacqueline Johnson MA  Sent: 10/21/2024   3:21 PM CDT  To: Cassi Lui RN; Aman Arzola MD  Subject: medication                                         ----- Message -----  From: Adry Boss  Sent: 10/21/2024   3:21 PM CDT  To: Dariusz DAVIS Staff    Type:  Call    Who Called:pt  Does the patient know what this is regarding?:med  Would the patient rather a call back or a response via [facility]? call  Best Call Back Number: [phone]  Additional Information: Pt stated she is allergic to ciprofloxacin HCl (CIPRO) 500 MG tablet.  Please send another med. to Pharmacy.

## 2024-11-08 ENCOUNTER — OFFICE VISIT (OUTPATIENT)
Dept: UROLOGY | Facility: CLINIC | Age: 69
End: 2024-11-08
Payer: MEDICARE

## 2024-11-08 VITALS
BODY MASS INDEX: 33.61 KG/M2 | SYSTOLIC BLOOD PRESSURE: 155 MMHG | DIASTOLIC BLOOD PRESSURE: 87 MMHG | WEIGHT: 178 LBS | HEART RATE: 79 BPM | HEIGHT: 61 IN

## 2024-11-08 DIAGNOSIS — Z98.890 POST-OPERATIVE STATE: Primary | ICD-10-CM

## 2024-11-08 PROCEDURE — 99999 PR PBB SHADOW E&M-EST. PATIENT-LVL III: CPT | Mod: PBBFAC,,, | Performed by: UROLOGY

## 2024-11-08 NOTE — PATIENT INSTRUCTIONS
Can resume physical activity with moderate lifting restriction,  Continue pelvic rest for least 2 more weeks  Follow up in 6 weeks for re-evaluation.

## 2024-11-20 ENCOUNTER — OFFICE VISIT (OUTPATIENT)
Dept: ORTHOPEDICS | Facility: CLINIC | Age: 69
End: 2024-11-20
Payer: MEDICARE

## 2024-11-20 VITALS — WEIGHT: 180.75 LBS | BODY MASS INDEX: 34.16 KG/M2

## 2024-11-20 DIAGNOSIS — M25.461 KNEE EFFUSION, RIGHT: Primary | ICD-10-CM

## 2024-11-20 PROCEDURE — 3044F HG A1C LEVEL LT 7.0%: CPT | Mod: CPTII,S$GLB,, | Performed by: ORTHOPAEDIC SURGERY

## 2024-11-20 PROCEDURE — 3288F FALL RISK ASSESSMENT DOCD: CPT | Mod: CPTII,S$GLB,, | Performed by: ORTHOPAEDIC SURGERY

## 2024-11-20 PROCEDURE — 3008F BODY MASS INDEX DOCD: CPT | Mod: CPTII,S$GLB,, | Performed by: ORTHOPAEDIC SURGERY

## 2024-11-20 PROCEDURE — 99999 PR PBB SHADOW E&M-EST. PATIENT-LVL III: CPT | Mod: PBBFAC,,, | Performed by: ORTHOPAEDIC SURGERY

## 2024-11-20 PROCEDURE — 99214 OFFICE O/P EST MOD 30 MIN: CPT | Mod: 25,S$GLB,, | Performed by: ORTHOPAEDIC SURGERY

## 2024-11-20 PROCEDURE — 1159F MED LIST DOCD IN RCRD: CPT | Mod: CPTII,S$GLB,, | Performed by: ORTHOPAEDIC SURGERY

## 2024-11-20 PROCEDURE — 1101F PT FALLS ASSESS-DOCD LE1/YR: CPT | Mod: CPTII,S$GLB,, | Performed by: ORTHOPAEDIC SURGERY

## 2024-11-20 PROCEDURE — 1125F AMNT PAIN NOTED PAIN PRSNT: CPT | Mod: CPTII,S$GLB,, | Performed by: ORTHOPAEDIC SURGERY

## 2024-11-20 PROCEDURE — 1160F RVW MEDS BY RX/DR IN RCRD: CPT | Mod: CPTII,S$GLB,, | Performed by: ORTHOPAEDIC SURGERY

## 2024-11-20 PROCEDURE — 89060 EXAM SYNOVIAL FLUID CRYSTALS: CPT | Performed by: ORTHOPAEDIC SURGERY

## 2024-11-20 PROCEDURE — 20610 DRAIN/INJ JOINT/BURSA W/O US: CPT | Mod: RT,S$GLB,, | Performed by: ORTHOPAEDIC SURGERY

## 2024-11-20 RX ADMIN — TRIAMCINOLONE ACETONIDE 40 MG: 40 INJECTION, SUSPENSION INTRA-ARTICULAR; INTRAMUSCULAR at 02:11

## 2024-11-20 RX ADMIN — LIDOCAINE HYDROCHLORIDE 4 ML: 10 INJECTION, SOLUTION INFILTRATION; PERINEURAL at 02:11

## 2024-11-21 ENCOUNTER — PATIENT MESSAGE (OUTPATIENT)
Dept: ORTHOPEDICS | Facility: CLINIC | Age: 69
End: 2024-11-21
Payer: MEDICARE

## 2024-11-21 ENCOUNTER — TELEPHONE (OUTPATIENT)
Dept: ORTHOPEDICS | Facility: CLINIC | Age: 69
End: 2024-11-21
Payer: MEDICARE

## 2024-11-21 LAB
BODY FLD TYPE: ABNORMAL
CRYSTALS FLD MICRO: POSITIVE

## 2024-11-21 RX ORDER — LIDOCAINE HYDROCHLORIDE 10 MG/ML
4 INJECTION, SOLUTION INFILTRATION; PERINEURAL
Status: DISCONTINUED | OUTPATIENT
Start: 2024-11-20 | End: 2024-11-21 | Stop reason: HOSPADM

## 2024-11-21 RX ORDER — TRIAMCINOLONE ACETONIDE 40 MG/ML
40 INJECTION, SUSPENSION INTRA-ARTICULAR; INTRAMUSCULAR
Status: DISCONTINUED | OUTPATIENT
Start: 2024-11-20 | End: 2024-11-21 | Stop reason: HOSPADM

## 2024-11-21 NOTE — TELEPHONE ENCOUNTER
----- Message from John Martinez MD sent at 11/21/2024  1:08 PM CST -----  Regarding: Test results  Let patient know that fluid I took off her knee yesterday showed gout. Nothing extra to do but wanted her to be aware.

## 2024-11-21 NOTE — TELEPHONE ENCOUNTER
Called and left message. Pt came into our clinic yesterday and had fluid drawn out of her joint. The fluid was tested and came back positive for gout. Left message for pt informing her that her tests came back positive for gout. Told pt she can talk to her primary care doctor or look into seeing a rheumatologist for long term treatment plans.

## 2024-11-21 NOTE — PROGRESS NOTES
Patient ID:   Rubia Mathews is a 69 y.o. female.    Chief Complaint:   Follow-up evaluation for right knee pain    HPI:   The patient is returning for evaluation of her right knee. In early September she presented with a large knee effusion after a recent trip. She underwent aspiration + CSI injection with improvement in her symptoms. Plain x-rays were nearly normal at the time.     She is returning today with the onset of similar symptoms. She describes tightness and burning pain over the anterior and posterior knee. Pain intensity is 3/10. She denies any injury.     Medications:    Current Outpatient Medications:     acetaminophen (TYLENOL) 500 MG tablet, Take 1,000 mg by mouth as needed for Pain., Disp: , Rfl:     albuterol (PROVENTIL/VENTOLIN HFA) 90 mcg/actuation inhaler, INHALE 2 PUFFS BY MOUTH EVERY 6 HOURS AS NEEDED FOR WHEEZING (Patient taking differently: Inhale 2 puffs into the lungs as needed for Shortness of Breath or Wheezing. Instructed to bring to hospital DOS), Disp: 18 g, Rfl: 3    amLODIPine (NORVASC) 10 MG tablet, Take 1 tablet (10 mg total) by mouth once daily. (Patient taking differently: Take 10 mg by mouth every evening.), Disp: 90 tablet, Rfl: 3    carvediloL (COREG) 6.25 MG tablet, Take 1 tablet (6.25 mg total) by mouth once daily. (Patient taking differently: Take 6.25 mg by mouth 2 (two) times daily.), Disp: 180 tablet, Rfl: 3    pantoprazole (PROTONIX) 40 MG tablet, Take 1 tablet (40 mg total) by mouth once daily. (Patient taking differently: Take 40 mg by mouth as needed.), Disp: 90 tablet, Rfl: 3    potassium chloride (MICRO-K) 8 mEq CpSR, Take 1 capsule (8 mEq total) by mouth once daily. (Patient taking differently: Take 8 mEq by mouth as needed. instructed to hold DOS), Disp: 90 capsule, Rfl: 3    TREMFYA 100 mg/mL AtIn, Inject 100 mg as directed. Every 2 months; next dose due in November, Disp: , Rfl:     triamterene-hydrochlorothiazide 37.5-25 mg (MAXZIDE-25) 37.5-25 mg  per tablet, Take 1 tablet by mouth once daily. (Patient taking differently: Take 1 tablet by mouth once daily. Instructed to hold am of sx), Disp: 90 tablet, Rfl: 3    Allergies:  Review of patient's allergies indicates:   Allergen Reactions    Losartan Hives, Swelling and Rash    Ciprofloxacin      visual disturbance    Lisinopril      coughing    Zithromax [azithromycin] Diarrhea       Past Medical History:  Past Medical History:   Diagnosis Date    Anemia     COVID-19     Diverticulitis of colon (without mention of hemorrhage)(562.11)     Effusion, right knee     Female bladder prolapse     urinary urgency, frequency, incontinence    GERD (gastroesophageal reflux disease)     HLD (hyperlipidemia)     borderline-no meds    HTN (hypertension)     Lumbago     Mitral valve disorders(424.0)     pt unaware of    Pain in joint, shoulder region     left arm pain    Prediabetes     no meds    Psoriasis     Right knee pain     Sarcoidosis     Wheezing     given inhaler but only uses as needed; h/o bronchitis        Past Surgical History:  Past Surgical History:   Procedure Laterality Date    ANTERIOR VAGINAL REPAIR N/A 10/21/2024    Procedure: COLPORRHAPHY, ANTERIOR;  Surgeon: Aman Arzola MD;  Location: [facility] OR;  Service: Urology;  Laterality: N/A;    APPLICATION, GRAFT N/A 10/21/2024    Procedure: application, dermal allograft;  Surgeon: Aman Arzola MD;  Location: [facility] OR;  Service: Urology;  Laterality: N/A;    BIOPSY OF VAGINA N/A 10/21/2024    Procedure: BIOPSY, VAGINA;  Surgeon: Aman Arzola MD;  Location: [facility] OR;  Service: Urology;  Laterality: N/A;    CATARACT EXTRACTION W/  INTRAOCULAR LENS IMPLANT Right 03/29/2018    Dr. Ferrari    CATARACT EXTRACTION W/  INTRAOCULAR LENS IMPLANT Left 08/2018    COLONOSCOPY N/A 06/03/2021    Procedure: COLONOSCOPY;  Surgeon: Evelia Dorsey MD;  Location: [facility] ENDO;  Service: Endoscopy;  Laterality: N/A;    COLONOSCOPY      multiple prior to 2021     CYSTOSCOPY N/A 10/21/2024    Procedure: CYSTOSCOPY;  Surgeon: Aman Arzola MD;  Location: [facility] OR;  Service: Urology;  Laterality: N/A;    CYSTOSCOPY WITH CYSTOMETROGRAPHY N/A 9/30/2024    Procedure: CYSTOSCOPY, WITH CYSTOMETROGRAM;  Surgeon: Aman Arzola MD;  Location: [facility] OR;  Service: Urology;  Laterality: N/A;    CYSTOSCOPY WITH URODYNAMIC TESTING N/A 9/30/2024    Procedure: CYSTOSCOPY, WITH URODYNAMIC TESTING;  Surgeon: Aman Arzola MD;  Location: [facility] OR;  Service: Urology;  Laterality: N/A;    ELECTROMYOGRAPHY N/A 9/30/2024    Procedure: EMG (ELECTROMYOGRAPHY);  Surgeon: Aman Arzola MD;  Location: [facility] OR;  Service: Urology;  Laterality: N/A;    ESOPHAGOGASTRODUODENOSCOPY N/A 07/06/2021    Procedure: EGD (ESOPHAGOGASTRODUODENOSCOPY);  Surgeon: Evelia Dorsey MD;  Location: [facility];  Service: Endoscopy;  Laterality: N/A;    HERNIA REPAIR      as a child    HYSTERECTOMY      REPAIR OF SACROSPINOUS LIGAMENT Bilateral 10/21/2024    Procedure: REPAIR, LIGAMENT, SACROSPINOUS;  Surgeon: Aman Arzola MD;  Location: [facility] OR;  Service: Urology;  Laterality: Bilateral;    UROFLOWMETRY N/A 9/30/2024    Procedure: UROFLOWMETRY;  Surgeon: Aman Arzola MD;  Location: [facility] OR;  Service: Urology;  Laterality: N/A;    vocal cord noduules      VOIDING URETHROCYSTOGRAPHY N/A 9/30/2024    Procedure: CYSTOURETHROGRAM, VOIDING;  Surgeon: Aman Arzola MD;  Location: [facility] OR;  Service: Urology;  Laterality: N/A;    WISDOM TOOTH EXTRACTION         Social History:  Social History     Occupational History     Employer: [employer]   Tobacco Use    Smoking status: Some Days     Current packs/day: 0.25     Types: Cigarettes     Passive exposure: Current    Smokeless tobacco: Never    Tobacco comments:     a pack/week-trying to quit; does not always smoking daily    Substance and Sexual Activity    Alcohol use: Yes     Comment: Social - Drinks wine     Drug use: Never    Sexual activity:  Not Currently     Partners: Male       Family History:  Family History   Problem Relation Name Age of Onset    Diabetes Mother          - 1933    Hypertension Mother          lives alone, pays bills, independent    Heart attack Mother  85        s/p stent    Cancer Father          d. 78 lymphoma    Hypertension Sister Diamond     Hypertension Sister Drea     Transient ischemic attack Sister Drea         no residual effects    Hypertension Brother Peña     Arthritis Brother Peña         s/p knee replacement    Kidney failure Son Felix         s/p kidney transplant, multiple complications    No Known Problems Grandchild Alie          2021    Amblyopia Neg Hx      Blindness Neg Hx      Cataracts Neg Hx      Glaucoma Neg Hx      Macular degeneration Neg Hx      Retinal detachment Neg Hx      Strabismus Neg Hx      Stroke Neg Hx      Thyroid disease Neg Hx          ROS:  Review of Systems   Musculoskeletal:  Positive for arthritis, joint pain, joint swelling and stiffness.   All other systems reviewed and are negative.      Vitals:  Wt 82 kg (180 lb 12.4 oz)   LMP  (LMP Unknown)   BMI 34.16 kg/m²     Physical Examination:  Comprehensive Orthopaedic Musculoskeletal Exam    General      Constitutional: appears stated age, mildly obese, well-developed and well-nourished    Scleral icterus: no    Labored breathing: no    Psychiatric: normal mood and affect and no acute distress    Neurological: alert and oriented x3    Skin: intact    Lymphadenopathy: none     Ortho Exam   Right knee exam:  No erythema  Large tense effusion  ROM 0-120  Tenderness along medial joint line.   Stable to varus and valgus.     Assessment:  1. Knee effusion, right      Plan:  The patient is having recurrent effusions. I have recommended knee aspiration and corticosteroid injection. In addition, synovial fluid will be sent for crystal analysis. Lastly, MRI was recommended to further evaluate for meniscal pathology in the  absence of nearly normal knee x-ray findings. She will be contacted with the results.     Orders Placed This Encounter    MRI Knee Without Contrast Right    Body fluid crystal Joint Fluid, Right Knee     No follow-ups on file.

## 2024-12-04 ENCOUNTER — TELEPHONE (OUTPATIENT)
Dept: INTERNAL MEDICINE | Facility: CLINIC | Age: 69
End: 2024-12-04
Payer: MEDICARE

## 2024-12-04 NOTE — TELEPHONE ENCOUNTER
Patient call returned, patient stated she needed an appointment to discuss concerns and follow up. Patient scheduled soonest available, patient voiced understanding of appointment time and date.  ----- Message from Tyson sent at 12/4/2024 11:01 AM CST -----  Contact: [phone]@[email or web address]  Good morning patient would like a call back to discuss getting a follow up apt for gout. Please call patient to advise [phone]

## 2024-12-09 ENCOUNTER — PATIENT MESSAGE (OUTPATIENT)
Dept: ORTHOPEDICS | Facility: CLINIC | Age: 69
End: 2024-12-09
Payer: MEDICARE

## 2024-12-09 ENCOUNTER — OFFICE VISIT (OUTPATIENT)
Dept: INTERNAL MEDICINE | Facility: CLINIC | Age: 69
End: 2024-12-09
Payer: MEDICARE

## 2024-12-09 VITALS
BODY MASS INDEX: 34.3 KG/M2 | HEART RATE: 65 BPM | OXYGEN SATURATION: 98 % | SYSTOLIC BLOOD PRESSURE: 138 MMHG | TEMPERATURE: 97 F | DIASTOLIC BLOOD PRESSURE: 70 MMHG | RESPIRATION RATE: 15 BRPM | WEIGHT: 181.69 LBS | HEIGHT: 61 IN

## 2024-12-09 DIAGNOSIS — M10.9 ACUTE GOUT OF RIGHT KNEE, UNSPECIFIED CAUSE: Primary | ICD-10-CM

## 2024-12-09 PROCEDURE — 1159F MED LIST DOCD IN RCRD: CPT | Mod: CPTII,S$GLB,, | Performed by: PHYSICIAN ASSISTANT

## 2024-12-09 PROCEDURE — 1126F AMNT PAIN NOTED NONE PRSNT: CPT | Mod: CPTII,S$GLB,, | Performed by: PHYSICIAN ASSISTANT

## 2024-12-09 PROCEDURE — 3078F DIAST BP <80 MM HG: CPT | Mod: CPTII,S$GLB,, | Performed by: PHYSICIAN ASSISTANT

## 2024-12-09 PROCEDURE — 1160F RVW MEDS BY RX/DR IN RCRD: CPT | Mod: CPTII,S$GLB,, | Performed by: PHYSICIAN ASSISTANT

## 2024-12-09 PROCEDURE — 99999 PR PBB SHADOW E&M-EST. PATIENT-LVL III: CPT | Mod: PBBFAC,,, | Performed by: PHYSICIAN ASSISTANT

## 2024-12-09 PROCEDURE — 99214 OFFICE O/P EST MOD 30 MIN: CPT | Mod: S$GLB,,, | Performed by: PHYSICIAN ASSISTANT

## 2024-12-09 PROCEDURE — 3075F SYST BP GE 130 - 139MM HG: CPT | Mod: CPTII,S$GLB,, | Performed by: PHYSICIAN ASSISTANT

## 2024-12-09 PROCEDURE — 3044F HG A1C LEVEL LT 7.0%: CPT | Mod: CPTII,S$GLB,, | Performed by: PHYSICIAN ASSISTANT

## 2024-12-09 PROCEDURE — 3008F BODY MASS INDEX DOCD: CPT | Mod: CPTII,S$GLB,, | Performed by: PHYSICIAN ASSISTANT

## 2024-12-09 RX ORDER — NAPROXEN 500 MG/1
500 TABLET ORAL 2 TIMES DAILY WITH MEALS
Qty: 30 TABLET | Refills: 3 | Status: SHIPPED | OUTPATIENT
Start: 2024-12-09

## 2024-12-09 RX ORDER — COLCHICINE 0.6 MG/1
0.6 TABLET ORAL DAILY
Qty: 30 TABLET | Refills: 6 | Status: SHIPPED | OUTPATIENT
Start: 2024-12-09 | End: 2025-12-09

## 2024-12-09 NOTE — PROGRESS NOTES
Subjective:     Chief Complaint: Follow-up (Discuss  possiable gout)    HPI  Ms. Rubia Mathews is a 69 y.o. female patient of Dr. Moreno presenting today for gout.    In the last month, Ms. Mathews presented to urgent care and orthopedics for right knee pain and swelling. In November, ortho aspirated fluid from the right knee, aspiration positive for crystals indicating gout. Ortho asked her to follow up with Internal Medicine/Rheumatology for further gout management.     She states that she is still having pain in the right knee, but not nearly as painful as it was in November when she was evaluated by urgent care and ortho. She states that the pain is the worst at night, states her knee feels like it is burning. For home relief, she has been using previously prescribed Meloxicam and Naproxen, and drinking tart cranberry juice which she read online is good for gout.     Review of Systems   Constitutional:  Negative for activity change, chills, fatigue and fever.   HENT:  Negative for nasal congestion, postnasal drip and rhinorrhea.    Eyes:  Negative for photophobia and visual disturbance.   Respiratory:  Negative for apnea, cough, shortness of breath and wheezing.    Cardiovascular:  Negative for chest pain and palpitations.   Gastrointestinal:  Negative for abdominal distention, abdominal pain, blood in stool, constipation and diarrhea.   Endocrine: Negative for cold intolerance, heat intolerance, polydipsia, polyphagia and polyuria.   Genitourinary:  Negative for bladder incontinence and difficulty urinating.   Neurological:  Negative for dizziness, weakness, light-headedness and headaches.         Objective:      Vitals:    12/09/24 1100   BP: 138/70   Pulse: 65   Resp: 15   Temp: 97.4 °F (36.3 °C)      Physical Exam  Constitutional:       Appearance: Normal appearance.   HENT:      Head: Normocephalic and atraumatic.      Right Ear: Tympanic membrane normal.      Left Ear: Tympanic membrane normal.    Eyes:      Extraocular Movements: Extraocular movements intact.      Pupils: Pupils are equal, round, and reactive to light.   Cardiovascular:      Rate and Rhythm: Normal rate and regular rhythm.      Pulses: Normal pulses.   Pulmonary:      Effort: Pulmonary effort is normal.      Breath sounds: Normal breath sounds.   Abdominal:      General: Abdomen is flat.      Palpations: Abdomen is soft.   Musculoskeletal:         General: Normal range of motion.      Comments: Full ROM in both knees. Slight swelling on the superior portion of the knee. No pain with palpation. No discoloration. Able to bear weight and ambulate well.   Skin:     General: Skin is warm and dry.      Capillary Refill: Capillary refill takes less than 2 seconds.   Neurological:      General: No focal deficit present.      Mental Status: She is alert.       Current Outpatient Medications on File Prior to Visit   Medication Sig Dispense Refill    acetaminophen (TYLENOL) 500 MG tablet Take 1,000 mg by mouth as needed for Pain.      albuterol (PROVENTIL/VENTOLIN HFA) 90 mcg/actuation inhaler INHALE 2 PUFFS BY MOUTH EVERY 6 HOURS AS NEEDED FOR WHEEZING (Patient taking differently: Inhale 2 puffs into the lungs as needed for Shortness of Breath or Wheezing. Instructed to bring to hospital DOS) 18 g 3    amLODIPine (NORVASC) 10 MG tablet Take 1 tablet (10 mg total) by mouth once daily. (Patient taking differently: Take 10 mg by mouth every evening.) 90 tablet 3    carvediloL (COREG) 6.25 MG tablet Take 1 tablet (6.25 mg total) by mouth once daily. (Patient taking differently: Take 6.25 mg by mouth 2 (two) times daily.) 180 tablet 3    pantoprazole (PROTONIX) 40 MG tablet Take 1 tablet (40 mg total) by mouth once daily. (Patient taking differently: Take 40 mg by mouth as needed.) 90 tablet 3    potassium chloride (MICRO-K) 8 mEq CpSR Take 1 capsule (8 mEq total) by mouth once daily. (Patient taking differently: Take 8 mEq by mouth as needed. instructed  to hold DOS) 90 capsule 3    TREMFYA 100 mg/mL AtIn Inject 100 mg as directed. Every 2 months; next dose due in November      triamterene-hydrochlorothiazide 37.5-25 mg (MAXZIDE-25) 37.5-25 mg per tablet Take 1 tablet by mouth once daily. (Patient taking differently: Take 1 tablet by mouth once daily. Instructed to hold am of sx) 90 tablet 3     No current facility-administered medications on file prior to visit.         Reviewed aspiration results from 11/20/24    Assessment:       1. Acute gout of right knee, unspecified cause        Plan:       Acute gout of right knee, unspecified cause  -Patient is finding good relief of pain with occasional home use of Naproxen use. Encouraged her to continue taking Naproxen for pain relief, sent refill to her pharmacy. Can additionally use colchicine daily. Discussed benefits and risks of medication, patient would like to continue with prescribed regimen. Encouraged her to discontinue meloxicam.  - Discussed the possibility of sending her to Rheumatology for further management if this regimen does not work well for her, which she agrees to.    Between attacks:   -Diet: Avoid high purine foods. Avoid excessive alcohol (especially beer), organ meats, shellfish, beverages sweetened in high fructose corn syrup  -Drink plenty of water to flush kidneys   -She is currently taking triamterene-HCTZ, she expresses that she has been on this medication for years without gout attacks and wants to continue.     Consulted collaborating physician, Dr. Ley, regarding initiation of gout treatment.    Krystal Perez PA-C  [facility]

## 2024-12-20 ENCOUNTER — OFFICE VISIT (OUTPATIENT)
Dept: UROLOGY | Facility: CLINIC | Age: 69
End: 2024-12-20
Payer: MEDICARE

## 2024-12-20 VITALS
HEART RATE: 66 BPM | SYSTOLIC BLOOD PRESSURE: 111 MMHG | BODY MASS INDEX: 34.66 KG/M2 | DIASTOLIC BLOOD PRESSURE: 58 MMHG | WEIGHT: 183.44 LBS

## 2024-12-20 DIAGNOSIS — Z53.20 PROCEDURE NOT CARRIED OUT BECAUSE OF PATIENT'S DECISION: Primary | ICD-10-CM

## 2024-12-20 PROCEDURE — 99499 UNLISTED E&M SERVICE: CPT | Mod: S$GLB,,, | Performed by: UROLOGY

## 2024-12-22 NOTE — PROGRESS NOTES
The patient left the office before the visit was finished.  The patient left the office before the visit was finished.

## 2025-03-24 DIAGNOSIS — Z00.00 ENCOUNTER FOR MEDICARE ANNUAL WELLNESS EXAM: ICD-10-CM

## 2025-05-06 NOTE — PROGRESS NOTES
"Rubia Mathews is a 69 y.o. female patient.   1. Post-operative state    Mrs. Mathews is a 69-year-old female with a history of grade 4 prolapsing cystocele.  Patient underwent repair with anterior colporrhaphy, sacrospinous ligament suspension and dermal interposition graft.  The patient was not having stress incontinence even with replacement of the bladder and therefore did not undergo sling placement at this time.  The patient has had no issues with recovery in his here for follow-up.  Past Medical History:   Diagnosis Date    Anemia     COVID-19     Diverticulitis of colon (without mention of hemorrhage)(562.11)     Effusion, right knee     Female bladder prolapse     urinary urgency, frequency, incontinence    GERD (gastroesophageal reflux disease)     HLD (hyperlipidemia)     borderline-no meds    HTN (hypertension)     Lumbago     Mitral valve disorders(424.0)     pt unaware of    Pain in joint, shoulder region     left arm pain    Prediabetes     no meds    Psoriasis     Right knee pain     Sarcoidosis     Wheezing     given inhaler but only uses as needed; h/o bronchitis     No past surgical history pertinent negatives on file.  Scheduled Meds:  Continuous Infusions:  PRN Meds:    Review of patient's allergies indicates:   Allergen Reactions    Losartan Hives, Swelling and Rash    Ciprofloxacin      visual disturbance    Lisinopril      coughing    Zithromax [azithromycin] Diarrhea     There are no hospital problems to display for this patient.    Blood pressure (!) 155/87, pulse 79, height 5' 1" (1.549 m), weight 80.7 kg (178 lb).    Subjective:   Diet: Adequate intake.  Patient reports no nausea or vomiting.    Activity level: Normal.    Pain control: Well controlled.      Objective:  Vital signs (most recent): Blood pressure (!) 155/87, pulse 79, height 5' 1" (1.549 m), weight 80.7 kg (178 lb).  General appearance: Comfortable, well-appearing, in no acute distress and not in pain.    Lungs:  " [facility] [address] [place], [place] [address] [address] [address]  Dept: [phone]  Dept Fax: [phone]    Behavioral Health Services Progress Note      Patient:  Pelon Philip    :  2006    Date of Service:  5/3/2025    The encounter diagnosis was Major depressive disorder with single episode, in partial remission (CMD).    60 minutes were spent with the patient in a video encounter.    Data and Progress toward goal(s) and objective(s):  The main themes of the session were: processing through positive interpersonal relationship experiences; exploration of school life.    Intervention:  Insight Oriented Strategies    Patient continues to be involved in service planning:  YES    Describe above interventions:  The main therapeutic interventions consisted of: an emphasis on self-expression; an emphasis on the establishment and maintenance of the therapeutic frame; developing and maintaining the therapeutic alliance so as to facilitate and foster continued therapeutic work; and an emphasis on helping the client develop greater trust in the therapeutic setting, therapist, and interpersonal relationships; acknowledging and validating the client’s feelings and thoughts without judgment; demonstrating acceptance and validation of the client’s experience; demonstrating curiosity and openness aimed at encouraging exploration; active listening and validation of the client’s emotional experience; empathic reflections aimed at communicating understanding; demonstrating an empathic and accepting attitude to establish a safe therapeutic space; open-ended questions to explore the client’s thoughts and feelings; rephrasing and summarizing the client’s statements to clarify meaning; and demonstrating and inviting a playful and open attitude within the session.     Patient's response to interventions:  The client’s response included: enthusiasm and motivation to continue the treatment;  Normal effort.    Heart: Normal rate.    Chest: Symmetric chest wall expansion.    Abdomen: Abdomen is soft.  No distension or ascites.    Bowel sounds:  Bowel sounds are normal.    Tenderness: There is no abdominal tenderness tenderness.    Wound:  Clean.  There is no dehiscence.  There is no drainage (Suture material hanging from incision as suture is being absorbed.  This was excised.  Speculum exam reveals good healing with no evidence of dermal graft exposure or dehiscence.).    Extremities: There is normal range of motion.    Neurological: The patient is alert and oriented to person, place and time.  Normal strength.  No right hemiparesis, left hemiparesis or tongue deviation.  Pupils are equal, round, and reactive to light.       Assessment:   Post-op: 18 days.    Condition: In stable condition.     Plan:  Encourage ambulation.  Regular diet.      Patient Instructions   Can resume physical activity with moderate lifting restriction,  Continue pelvic rest for least 2 more weeks  Follow up in 6 weeks for re-evaluation.       Aman Arzola MD  11/8/2024     an expression of fondness for the therapist and the therapeutic space; a heightened and positive alliance with the therapist and trust in the treatment process; and an openness to the interventions offered.     Continue to support patient's efforts and progress towards established treatment plan goals in the following ways:  The ongoing treatment plan includes: continuation of the treatment as indicated; and continued support and maintenance of the psychotherapeutic process.     Rachelle Tang LCPC    This visit is being performed virtually via Video visit using [employer].   Clinical Location: [facility] [address] Home and is physically present in   the [facility] at the time of this visit.

## 2025-05-09 ENCOUNTER — OFFICE VISIT (OUTPATIENT)
Dept: FAMILY MEDICINE | Facility: CLINIC | Age: 70
End: 2025-05-09
Payer: MEDICARE

## 2025-05-09 ENCOUNTER — TELEPHONE (OUTPATIENT)
Dept: ADMINISTRATIVE | Facility: CLINIC | Age: 70
End: 2025-05-09
Payer: MEDICARE

## 2025-05-09 VITALS — HEIGHT: 61 IN | BODY MASS INDEX: 34.55 KG/M2 | WEIGHT: 183 LBS

## 2025-05-09 DIAGNOSIS — E78.5 HYPERLIPIDEMIA, UNSPECIFIED HYPERLIPIDEMIA TYPE: ICD-10-CM

## 2025-05-09 DIAGNOSIS — Z12.11 COLON CANCER SCREENING: ICD-10-CM

## 2025-05-09 DIAGNOSIS — Z12.31 BREAST CANCER SCREENING BY MAMMOGRAM: ICD-10-CM

## 2025-05-09 DIAGNOSIS — M54.50 LOW BACK PAIN RADIATING TO LEFT LEG: ICD-10-CM

## 2025-05-09 DIAGNOSIS — D50.8 IRON DEFICIENCY ANEMIA SECONDARY TO INADEQUATE DIETARY IRON INTAKE: ICD-10-CM

## 2025-05-09 DIAGNOSIS — Z78.0 POSTMENOPAUSAL: ICD-10-CM

## 2025-05-09 DIAGNOSIS — E66.09 CLASS 1 OBESITY DUE TO EXCESS CALORIES WITH SERIOUS COMORBIDITY AND BODY MASS INDEX (BMI) OF 34.0 TO 34.9 IN ADULT: ICD-10-CM

## 2025-05-09 DIAGNOSIS — E66.811 CLASS 1 OBESITY DUE TO EXCESS CALORIES WITH SERIOUS COMORBIDITY AND BODY MASS INDEX (BMI) OF 34.0 TO 34.9 IN ADULT: ICD-10-CM

## 2025-05-09 DIAGNOSIS — R73.03 PRE-DIABETES: ICD-10-CM

## 2025-05-09 DIAGNOSIS — Z13.820 SCREENING FOR OSTEOPOROSIS: ICD-10-CM

## 2025-05-09 DIAGNOSIS — M47.812 CERVICAL SPONDYLOSIS: ICD-10-CM

## 2025-05-09 DIAGNOSIS — Z00.00 ENCOUNTER FOR MEDICARE ANNUAL WELLNESS EXAM: Primary | ICD-10-CM

## 2025-05-09 DIAGNOSIS — M50.30 DEGENERATIVE CERVICAL DISC: ICD-10-CM

## 2025-05-09 DIAGNOSIS — I10 PRIMARY HYPERTENSION: ICD-10-CM

## 2025-05-09 DIAGNOSIS — M79.605 LOW BACK PAIN RADIATING TO LEFT LEG: ICD-10-CM

## 2025-05-09 RX ORDER — DICYCLOMINE HYDROCHLORIDE 20 MG/1
20 TABLET ORAL EVERY 6 HOURS PRN
COMMUNITY
Start: 2025-03-23

## 2025-05-09 RX ORDER — PIMECROLIMUS 10 MG/G
1 CREAM TOPICAL 2 TIMES DAILY
COMMUNITY
Start: 2025-03-27

## 2025-05-09 NOTE — PATIENT INSTRUCTIONS
[phone] [employer]   Dr. Taylor Ayoub is a PCP located in [place].   Counseling and Referral of Other Preventative  (Italic type indicates deductible and co-insurance are waived)    Patient Name: Rubia Mathews  Today's Date: 5/9/2025    Health Maintenance       Date Due Completion Date    Pneumococcal Vaccines (Age 50+) (1 of 2 - PCV) Never done ---    Shingles Vaccine (1 of 2) Never done ---    RSV Vaccine (Age 60+ and Pregnant patients) (1 - Risk 60-74 years 1-dose series) Never done ---    Colorectal Cancer Screening 06/03/2024 6/3/2021    COVID-19 Vaccine (3 - 2024-25 season) 09/01/2024 12/1/2021    Mammogram 05/29/2025 5/29/2024    DEXA Scan 07/25/2025 7/25/2022    Hemoglobin A1c (Prediabetes) 08/07/2025 8/7/2024    Influenza Vaccine (Season Ended) 09/01/2025 ---    Lipid Panel 08/07/2029 8/7/2024    TETANUS VACCINE 12/24/2031 12/24/2021        Orders Placed This Encounter   Procedures    Mammo Digital Screening Bilat w/ Toby (XPD)    DXA Bone Density Axial Skeleton 1 or more sites    Ambulatory referral/consult to Endo Procedure      The following information is provided to all patients.  This information is to help you find resources for any of the problems found today that may be affecting your health:                  Living healthy guide: www.[facility].[place].gov      Understanding Diabetes: www.diabetes.org      Eating healthy: www.cdc.gov/healthyweight      CDC home safety checklist: www.cdc.gov/steadi/patient.html      Agency on Aging: www.goea.[place].gov      Alcoholics anonymous (AA): www.aa.org      Physical Activity: www.susana.nih.gov/qz1cude      Tobacco use: www.quitwithusla.org

## 2025-05-09 NOTE — PROGRESS NOTES
"      The patient location is: [place]  The chief complaint leading to consultation is: Medicare AWV     Visit type: audiovisual    Face to Face time with patient: 32  60 minutes of total time spent on the encounter, which includes face to face time and non-face to face time preparing to see the patient (eg, review of tests), Obtaining and/or reviewing separately obtained history, Documenting clinical information in the electronic or other health record, Independently interpreting results (not separately reported) and communicating results to the patient/family/caregiver, or Care coordination (not separately reported).         Each patient to whom he or she provides medical services by telemedicine is:  (1) informed of the relationship between the physician and patient and the respective role of any other health care provider with respect to management of the patient; and (2) notified that he or she may decline to receive medical services by telemedicine and may withdraw from such care at any time.    Notes:                         Rubia Mathews presented for a  Medicare AWV and comprehensive Health Risk Assessment today. The following components were reviewed and updated:    Medical history  Family History  Social history  Allergies and Current Medications  Health Risk Assessment  Health Maintenance  Care Team         ** See Completed Assessments for Annual Wellness Visit within the encounter summary.**         The following assessments were completed:  Living Situation  CAGE  Depression Screening  Fall Risk Assessment (MACH 10)  Hearing Assessment(HHI)  Cognitive Function Screening  Nutrition Screening  ADL Screening  PAQ Screening    Opioid documentation:      Patient does not have a current opioid prescription.        Vitals:    05/09/25 1401   Weight: 83 kg (183 lb)   Height: 5' 1" (1.549 m)     Body mass index is 34.58 kg/m².  Physical Exam  Constitutional:       General: She is not in acute distress.   "   Appearance: Normal appearance. She is well-developed and well-groomed.   Skin:     Coloration: Skin is not pale.   Neurological:      Mental Status: She is alert and oriented to person, place, and time.   Psychiatric:         Mood and Affect: Mood normal.         Speech: Speech normal.         Behavior: Behavior normal. Behavior is cooperative.         Thought Content: Thought content normal.               Diagnoses and health risks identified today and associated recommendations/orders:    1. Encounter for Medicare annual wellness exam  Screenings performed, as noted above. Personal preventative testing needs reviewed.   - Referral to Enhanced Annual Wellness Visit (eAWV) W+1    2. Class 1 obesity due to excess calories with serious comorbidity and body mass index (BMI) of 34.0 to 34.9 in adult  Work on diet modification and increase in physical activity as tolerated.   Followed by PCP.    3. Degenerative cervical disc  4. Low back pain radiating to left leg  5. Cervical spondylosis  Chronic; stable on naproxen. Followed by PCP.     6. Hyperlipidemia, unspecified hyperlipidemia type  Chronic. Stable. Followed by PCP.     7. Iron deficiency anemia secondary to inadequate dietary iron intake  Chronic. Stable. Followed by PCP.     8. Primary hypertension  Chronic. Stable with maxzide, amlodipine, carvedilol. Followed by PCP.     9. Pre-diabetes  A1C 5.9. Currently not taking any meds. Followed by PCP.    10. Breast cancer screening by mammogram  - Mammo Digital Screening Bilat w/ Toby (XPD); Future    11. Postmenopausal  12. Screening for osteoporosis  - DXA Bone Density Axial Skeleton 1 or more sites; Future    13. Colon cancer screening  Last colonoscopy 06/2021; recommendation is to repeat in 3 years. Order placed, advised patient someone will call to get this scheduled. Followed by PCP.   - Ambulatory referral/consult to Endo Procedure ; Future      Provided Rubia with a 5-10 year written screening  schedule and personal prevention plan. Recommendations were developed using the USPSTF age appropriate recommendations. Education, counseling, and referrals were provided as needed. After Visit Summary printed and given to patient which includes a list of additional screenings\tests needed.    Follow up in about 1 year (around 5/9/2026) for your next annual wellness visit.    Faye Chiang NP      Advance Care Planning     I offered to discuss advanced care planning, including how to pick a person who would make decisions for you if you were unable to make them for yourself, called a health care power of , and what kind of decisions you might make such as use of life sustaining treatments such as ventilators and tube feeding when faced with a life limiting illness recorded on a living will that they will need to know. (How you want to be cared for as you near the end of your natural life)     X  Patient is unwilling to engage in a discussion regarding advance directives at this time.

## 2025-05-12 RX ORDER — NAPROXEN 500 MG/1
500 TABLET ORAL 2 TIMES DAILY WITH MEALS
Qty: 30 TABLET | Refills: 3 | OUTPATIENT
Start: 2025-05-12

## 2025-05-12 NOTE — TELEPHONE ENCOUNTER
Provider Staff:  Please note Refusal of medication.     Action required for this patient.      Requested Prescriptions     Refused Prescriptions Disp Refills    naproxen (NAPROSYN) 500 MG tablet 30 tablet 3     Sig: Take 1 tablet (500 mg total) by mouth 2 (two) times daily with meals.     Refused By: BRYAN DE LA PAZ     Reason for Refusal: Refill not appropriate      Thanks!  [facility]   Note composed: 05/12/2025 11:15 AM

## 2025-05-12 NOTE — TELEPHONE ENCOUNTER
Refill Routing Note   Medication(s) are not appropriate for processing by [facility] for the following reason(s):        Outside of protocol  Non-participating provider    [facility] action(s):  Route               Appointments  past 12m or future 3m with PCP    Date Provider   Last Visit   12/9/2024 Krystal Perez PA-C   Next Visit   Visit date not found Krystal Perez PA-C   ED visits in past 90 days: 0        Note composed:9:10 AM 05/12/2025

## 2025-05-14 RX ORDER — NAPROXEN 500 MG/1
500 TABLET ORAL 2 TIMES DAILY WITH MEALS
Qty: 30 TABLET | Refills: 3 | OUTPATIENT
Start: 2025-05-14

## 2025-05-14 NOTE — TELEPHONE ENCOUNTER
No care due was identified.  [facility] Embedded Care Due Messages. Reference number: [id number].   5/14/2025 11:49:46 AM CDT

## 2025-05-21 NOTE — TELEPHONE ENCOUNTER
Patient states she needs refill on her medication naproxen but it was denied because she was told she has 3 refills at pharmacy. Patient stated she went to pharmacy to  medication and was told she has no refills left, patient was told by pharmacy she supposedly picked up refills but patient states she doesn't recall getting medications. Patient is requesting a prescription be sent to pharmacy.  ----- Message from Mayela sent at 5/21/2025 10:01 AM CDT -----  Contact: [phone]  Type: Rx Clarification/ Additional Information/ QuestionsMedication:naproxen (NAPROSYN) 500 MG tabletWhat questions do you have about the medication, if any?states she put through the portal and it was denied What information is needed? She states she had refills when she had the appt with the NP asking if this will be filled for her Pharmacy number:[id number] - [facility] [address] [address] AT [facility] [id number] [id number]: [phone] Fax: [phone] Pharmacy Contact Name:Comments:

## 2025-05-30 RX ORDER — NAPROXEN 500 MG/1
500 TABLET ORAL 2 TIMES DAILY WITH MEALS
Qty: 30 TABLET | Refills: 3 | OUTPATIENT
Start: 2025-05-30

## 2025-06-06 ENCOUNTER — RESULTS FOLLOW-UP (OUTPATIENT)
Dept: FAMILY MEDICINE | Facility: CLINIC | Age: 70
End: 2025-06-06

## 2025-06-30 ENCOUNTER — OFFICE VISIT (OUTPATIENT)
Dept: FAMILY MEDICINE | Facility: CLINIC | Age: 70
End: 2025-06-30
Payer: MEDICARE

## 2025-06-30 VITALS
DIASTOLIC BLOOD PRESSURE: 74 MMHG | HEART RATE: 73 BPM | TEMPERATURE: 98 F | HEIGHT: 61 IN | OXYGEN SATURATION: 98 % | WEIGHT: 178.13 LBS | SYSTOLIC BLOOD PRESSURE: 122 MMHG | RESPIRATION RATE: 20 BRPM | BODY MASS INDEX: 33.63 KG/M2

## 2025-06-30 DIAGNOSIS — G89.29 CHRONIC LEFT-SIDED LOW BACK PAIN WITH LEFT-SIDED SCIATICA: ICD-10-CM

## 2025-06-30 DIAGNOSIS — E78.49 OTHER HYPERLIPIDEMIA: ICD-10-CM

## 2025-06-30 DIAGNOSIS — I10 PRIMARY HYPERTENSION: ICD-10-CM

## 2025-06-30 DIAGNOSIS — M50.30 DEGENERATIVE CERVICAL DISC: ICD-10-CM

## 2025-06-30 DIAGNOSIS — E66.811 CLASS 1 OBESITY DUE TO EXCESS CALORIES WITH SERIOUS COMORBIDITY AND BODY MASS INDEX (BMI) OF 33.0 TO 33.9 IN ADULT: ICD-10-CM

## 2025-06-30 DIAGNOSIS — M54.42 CHRONIC LEFT-SIDED LOW BACK PAIN WITH LEFT-SIDED SCIATICA: ICD-10-CM

## 2025-06-30 DIAGNOSIS — K21.9 GASTROESOPHAGEAL REFLUX DISEASE WITHOUT ESOPHAGITIS: ICD-10-CM

## 2025-06-30 DIAGNOSIS — R73.03 PRE-DIABETES: ICD-10-CM

## 2025-06-30 DIAGNOSIS — J06.9 VIRAL URI WITH COUGH: ICD-10-CM

## 2025-06-30 DIAGNOSIS — E66.09 CLASS 1 OBESITY DUE TO EXCESS CALORIES WITH SERIOUS COMORBIDITY AND BODY MASS INDEX (BMI) OF 33.0 TO 33.9 IN ADULT: ICD-10-CM

## 2025-06-30 DIAGNOSIS — R79.89 ELEVATED SERUM CREATININE: ICD-10-CM

## 2025-06-30 DIAGNOSIS — Z00.00 ENCOUNTER FOR MEDICAL EXAMINATION TO ESTABLISH CARE: ICD-10-CM

## 2025-06-30 DIAGNOSIS — M17.11 PRIMARY OSTEOARTHRITIS OF RIGHT KNEE: ICD-10-CM

## 2025-06-30 PROCEDURE — 99999 PR PBB SHADOW E&M-EST. PATIENT-LVL III: CPT | Mod: PBBFAC,,, | Performed by: STUDENT IN AN ORGANIZED HEALTH CARE EDUCATION/TRAINING PROGRAM

## 2025-06-30 RX ORDER — GABAPENTIN 300 MG/1
300 CAPSULE ORAL NIGHTLY
Qty: 90 CAPSULE | Refills: 3 | Status: SHIPPED | OUTPATIENT
Start: 2025-06-30 | End: 2026-06-30

## 2025-06-30 NOTE — PROGRESS NOTES
[facility] Note    Chief Complaint      Chief Complaint   Patient presents with    Establish Care    Cough    Otalgia     All symptoms since middle of last week    Nasal Congestion     History of Present Illness      Reid Barkley is a 71yo F with sHTN, prediabetes , GERD, OA right knee, chronic LBP with radiculopathy due to degenerative lumbar arthritis who presents for establishment of care . She c/o nasal congestion, mild cough and mild external ear pain in the past 1 week. She initially had a sore throat for 1-2 days, which has since resolved. Her cough produces slightly green phlegm, while nasal discharge is whitish or beige in color.    She attempted self-treatment with OTC medications. She used xyzal nightly until about 3 days ago, discontinuing due to perceived ineffectiveness. She has also been using Flonase nasal spray. For wheezing, which occurred yesterday, she used her inhaler, providing relief. Her symptoms have remained relatively constant.    She mentions potential exposure to illness, noting her grandchildren, who live with her, have mild coughs.    She reports a recent fall on the previous Friday, tripping over toys left by her grandchildren, resulting in injuries to both knees. She has been applying antibiotic cream to the affected areas, particularly at night.    She also mentions ongoing arthritis pain, for which she previously used Gabapentin. While initially helpful, its effectiveness diminished over time, leading to discontinuation.    She denies pain when swallowing, chest pain, difficulty breathing (aside from the reported wheezing episode), and facial pain.    ROS:  General: -fever, -chills, -fatigue, -weight gain, -weight loss  Eyes: -vision changes, -redness, -discharge  ENT: -ear pain, +nasal congestion, -sore throat, +nasal discharge, +runny nose, +difficulty hearing  Cardiovascular: -chest pain, -palpitations, -lower extremity edema  Respiratory: +cough,  "-shortness of breath, +wheezing, +productive cough  Gastrointestinal: -abdominal pain, -nausea, -vomiting, -diarrhea, -constipation, -blood in stool  Genitourinary: -dysuria, -hematuria, -frequency  Musculoskeletal: +joint pain, -muscle pain, +muscle cramps, +leg cramping, +joint swelling, +limb pain  Skin: -rash, -lesion  Neurological: -headache, -dizziness, -numbness, -tingling  Psychiatric: -anxiety, -depression, -sleep difficulty          Rubia Mathews is a 70 y.o. female who presents with:    Problem List Addressed This Visit:  1. Encounter for medical examination to establish care    2. Primary hypertension  -     CBC Auto Differential; Standing  -     Comprehensive Metabolic Panel; Standing  -     TSH; Standing  -     Hypertension Digital Medicine (HDMP) Enrollment Order    3. Pre-diabetes  -     Hemoglobin A1C; Standing    4. Elevated serum creatinine  -     Microalbumin/Creatinine Ratio, Urine; Standing    5. Degenerative cervical disc    6. Other hyperlipidemia  Overview:  Statin recommended 8/2023    Orders:  -     Lipid Panel; Standing    7. Class 1 obesity due to excess calories with serious comorbidity and body mass index (BMI) of 33.0 to 33.9 in adult    8. Chronic left-sided low back pain with left-sided sciatica  -     gabapentin (NEURONTIN) 300 MG capsule; Take 1 capsule (300 mg total) by mouth nightly.  Dispense: 90 capsule; Refill: 3           Encounter Medications[1]     Review of patient's allergies indicates:   Allergen Reactions    Losartan Hives, Swelling and Rash    Ciprofloxacin      visual disturbance    Lisinopril      coughing    Zithromax [azithromycin] Diarrhea       Physical Exam      Vital Signs  Temp: 98.4 °F (36.9 °C)  Temp Source: Temporal  Pulse: 73  Resp: 20  SpO2: 98 %  BP: 130/70  BP Location: Right arm  Patient Position: Sitting  Pain Score:   2  Pain Loc: Ear  Height and Weight  Height: 5' 1" (154.9 cm)  Weight: 80.8 kg (178 lb 2.1 oz)  BSA (Calculated - sq m): " "1.86 sq meters  BMI (Calculated): 33.7  Weight in (lb) to have BMI = 25: 132]    Physical Exam    General: No acute distress. Well-developed. Well-nourished.  Eyes: EOMI. Sclerae anicteric.  HENT: Normocephalic. Atraumatic. Nares patent. Moist oral mucosa. Post-nasal drip present.  Ears: Bilateral TMs clear. Bilateral EACs clear.  Cardiovascular: Regular rate. Regular rhythm. No murmurs. No rubs. No gallops. Normal S1, S2.  Respiratory: Normal respiratory effort. Clear to auscultation bilaterally. No rales. No rhonchi. No wheezing.  Abdomen: Soft. Non-tender. Non-distended. Normoactive bowel sounds.  Musculoskeletal: No  obvious deformity. Bilateral knee abrasions healing well.  Extremities: No lower extremity edema.  Neurological: Alert & oriented x3. No slurred speech. Normal gait.  Psychiatric: Normal mood. Normal affect. Good insight. Good judgment.  Skin: Warm. Dry. No rash.          Laboratory:  CBC:  No results for input(s): "WBC", "RBC", "HGB", "HCT", "PLT", "MCV", "MCH", "MCHC" in the last 2160 hours.  CMP:  No results for input(s): "GLU", "CALCIUM", "ALBUMIN", "PROT", "NA", "K", "CO2", "CL", "BUN", "ALKPHOS", "ALT", "AST", "BILITOT" in the last 2160 hours.    Invalid input(s): "CREATININ"  URINALYSIS:  No results for input(s): "COLORU", "CLARITYU", "SPECGRAV", "PHUR", "PROTEINUA", "GLUCOSEU", "BILIRUBINCON", "BLOODU", "WBCU", "RBCU", "BACTERIA", "MUCUS", "NITRITE", "LEUKOCYTESUR", "UROBILINOGEN", "HYALINECASTS" in the last 2160 hours.   LIPIDS:  No results for input(s): "TSH", "HDL", "CHOL", "TRIG", "LDLCALC", "CHOLHDL", "NONHDLCHOL", "TOTALCHOLEST" in the last 2160 hours.  TSH:  No results for input(s): "TSH" in the last 2160 hours.  A1C:  No results for input(s): "HGBA1C" in the last 2160 hours.    Radiology:      Assessment/Plan     Rubia Mathews is a 70 y.o.female with:    1. Encounter for medical examination to establish care    2. Primary hypertension  - CBC Auto Differential; Standing  - " Comprehensive Metabolic Panel; Standing  - TSH; Standing  - Hypertension Digital Medicine (HDMP) Enrollment Order    3. Pre-diabetes  - Hemoglobin A1C; Standing    4. Elevated serum creatinine  - Microalbumin/Creatinine Ratio, Urine; Standing    5. Degenerative cervical disc    6. Other hyperlipidemia  - Lipid Panel; Standing    7. Class 1 obesity due to excess calories with serious comorbidity and body mass index (BMI) of 33.0 to 33.9 in adult    8. Chronic left-sided low back pain with left-sided sciatica  - gabapentin (NEURONTIN) 300 MG capsule; Take 1 capsule (300 mg total) by mouth nightly.  Dispense: 90 capsule; Refill: 3    Assessment & Plan      ESSENTIAL HYPERTENSION:  - Blood pressure is adequately controlled with current medications.  - Instructed patient to continue taking carvedilol, amlodipine, and triamterene as prescribed.  - Discontinued naproxen due to kidney risk and advised on risks of NSAID use, particularly naproxen, for patients with hypertension.    ACUTE VIRAL URI/ POSTNASAL DRIP:  - Assessed significant post-nasal drip contributing to congestion and sore throat symptoms, observed during exam.  - Explained importance of consistent use of antihistamines for symptom management and synergistic effect of using Flonase and Xyzal together.  - Prescribed Xyzal to be taken nightly for next 2-3 weeks.  - Continued Flonase for daily use, timing (morning or night) at patient's discretion.    PREDIABETES:  - Reviewed history of prediabetes.  - Monitored prediabetic status and advised patient to monitor blood sugar.  - Planned to check blood sugar during fasting labs.  -counseled on life style modifications     OA KNEE, RIGHT/ CHRONIC LOW BACK PAIN 2/2 LUMBAR ARTHRITIS  -stable  -c/w tylenol arthritis PRN     REDUCED eGRF  - Monitored kidney function which decreased from 60 to 55 last year.  - Evaluated impact of NSAIDs on renal function, noting previous decline in eGFR.  - Planned to check kidney  function during fasting labs.  - Discontinued naproxen and instructed patient to avoid all NSAIDs including ibuprofen, motrin, and naproxen to protect kidney function.    GERD  -controlled on PPI     CERVICAL ARTHRITIS  -stable  -c/w stretching exercises and tylenol PRN     BMI 33  -counseled on life style modifications          -Continue current medications and maintain follow up with specialists.      Patient verbalizes understanding and agrees with current treatment plan.    This note was generated with the assistance of ambient listening technology. I attest to having reviewed and edited the generated note for accuracy, though some syntax or spelling errors may persist. Please contact the author of this note for any clarification.         Taylor Martinez MD  Internal Medicine   [facility]                       [1]   Outpatient Encounter Medications as of 6/30/2025   Medication Sig Dispense Refill    acetaminophen (TYLENOL) 500 MG tablet Take 1,000 mg by mouth as needed for Pain.      albuterol (PROVENTIL/VENTOLIN HFA) 90 mcg/actuation inhaler INHALE 2 PUFFS BY MOUTH EVERY 6 HOURS AS NEEDED FOR WHEEZING (Patient taking differently: Inhale 2 puffs into the lungs as needed. Instructed to bring to hospital day of procedure) 18 g 3    amLODIPine (NORVASC) 10 MG tablet Take 1 tablet (10 mg total) by mouth once daily. (Patient taking differently: Take 10 mg by mouth once daily. Takes in afternoon) 90 tablet 3    carvediloL (COREG) 6.25 MG tablet Take 1 tablet (6.25 mg total) by mouth once daily. (Patient taking differently: Take 6.25 mg by mouth 2 (two) times a day.) 180 tablet 3    dicyclomine (BENTYL) 20 mg tablet Take 20 mg by mouth as needed (diarrhea).      pantoprazole (PROTONIX) 40 MG tablet Take 1 tablet (40 mg total) by mouth once daily. (Patient taking differently: Take 40 mg by mouth as needed (reflux/heartburn).) 90 tablet 3    pimecrolimus (ELIDEL) 1 % cream Apply 1 Application  topically nightly.      potassium chloride (MICRO-K) 8 mEq CpSR Take 1 capsule (8 mEq total) by mouth once daily. (Patient taking differently: Take 8 mEq by mouth as needed (leg cramps). Not currently taking) 90 capsule 3    TREMFYA 100 mg/mL AtIn Inject 100 mg as directed. Every 2 months; next dose due in 5/2025      triamterene-hydrochlorothiazide 37.5-25 mg (MAXZIDE-25) 37.5-25 mg per tablet Take 1 tablet by mouth once daily. (Patient taking differently: Take 1 tablet by mouth once daily. Instructed to hold day of procedure) 90 tablet 3    [DISCONTINUED] naproxen (NAPROSYN) 500 MG tablet Take 1 tablet (500 mg total) by mouth 2 (two) times daily with meals. (Patient taking differently: Take 500 mg by mouth as needed. Instructed to hold for one week prior to procedure) 30 tablet 3    gabapentin (NEURONTIN) 300 MG capsule Take 1 capsule (300 mg total) by mouth nightly. 90 capsule 3     No facility-administered encounter medications on file as of 6/30/2025.

## 2025-07-01 ENCOUNTER — RESULTS FOLLOW-UP (OUTPATIENT)
Dept: FAMILY MEDICINE | Facility: CLINIC | Age: 70
End: 2025-07-01

## 2025-07-26 ENCOUNTER — OFFICE VISIT (OUTPATIENT)
Dept: URGENT CARE | Facility: CLINIC | Age: 70
End: 2025-07-26
Payer: MEDICARE

## 2025-07-26 VITALS
RESPIRATION RATE: 17 BRPM | OXYGEN SATURATION: 98 % | HEIGHT: 61 IN | TEMPERATURE: 98 F | HEART RATE: 63 BPM | SYSTOLIC BLOOD PRESSURE: 135 MMHG | BODY MASS INDEX: 33.61 KG/M2 | WEIGHT: 178 LBS | DIASTOLIC BLOOD PRESSURE: 78 MMHG

## 2025-07-26 DIAGNOSIS — B96.89 ACUTE BACTERIAL BRONCHITIS: Primary | ICD-10-CM

## 2025-07-26 DIAGNOSIS — R05.9 COUGH, UNSPECIFIED TYPE: ICD-10-CM

## 2025-07-26 DIAGNOSIS — R09.81 NASAL CONGESTION: ICD-10-CM

## 2025-07-26 DIAGNOSIS — J20.8 ACUTE BACTERIAL BRONCHITIS: Primary | ICD-10-CM

## 2025-07-26 LAB
CTP QC/QA: YES
SARS-COV+SARS-COV-2 AG RESP QL IA.RAPID: NEGATIVE

## 2025-07-26 PROCEDURE — 87811 SARS-COV-2 COVID19 W/OPTIC: CPT | Mod: QW,S$GLB,, | Performed by: PHYSICIAN ASSISTANT

## 2025-07-26 PROCEDURE — 99214 OFFICE O/P EST MOD 30 MIN: CPT | Mod: S$GLB,,, | Performed by: PHYSICIAN ASSISTANT

## 2025-07-26 RX ORDER — DOXYCYCLINE 100 MG/1
100 CAPSULE ORAL 2 TIMES DAILY WITH MEALS
Qty: 20 CAPSULE | Refills: 0 | Status: SHIPPED | OUTPATIENT
Start: 2025-07-26 | End: 2025-08-05

## 2025-07-26 RX ORDER — PREDNISONE 20 MG/1
20 TABLET ORAL DAILY
Qty: 3 TABLET | Refills: 0 | Status: SHIPPED | OUTPATIENT
Start: 2025-07-26 | End: 2025-07-29

## 2025-07-26 RX ORDER — GUAIFENESIN AND DEXTROMETHORPHAN HYDROBROMIDE 1200; 60 MG/1; MG/1
1 TABLET, EXTENDED RELEASE ORAL EVERY 12 HOURS
Qty: 20 TABLET | Refills: 0 | Status: SHIPPED | OUTPATIENT
Start: 2025-07-26 | End: 2025-08-05

## 2025-07-26 RX ORDER — TAPINAROF 10 MG/1000MG
CREAM TOPICAL
COMMUNITY
Start: 2025-06-18

## 2025-07-26 RX ORDER — ALBUTEROL SULFATE 90 UG/1
2 INHALANT RESPIRATORY (INHALATION) EVERY 4 HOURS PRN
Qty: 18 G | Refills: 0 | Status: SHIPPED | OUTPATIENT
Start: 2025-07-26 | End: 2026-07-26

## 2025-07-26 NOTE — PROGRESS NOTES
"Subjective:      Patient ID: Rubia Mathews is a 70 y.o. female.    Vitals:  height is 5' 1" (1.549 m) and weight is 80.7 kg (178 lb). Her oral temperature is 98.2 °F (36.8 °C). Her blood pressure is 135/78 and her pulse is 63. Her respiration is 17 and oxygen saturation is 98%.     Chief Complaint: Nasal Congestion    Reports nasal congestion x 2-3 weeks. States she started with a cough with mucus production x 1 week. She was using Flonase without relief. Taking mucinex daily and otc sinus tablets with mild relief.     Sinus Problem  This is a new problem. The current episode started 1 to 4 weeks ago (1-2 weeks). The problem is unchanged. There has been no fever. Her pain is at a severity of 0/10. She is experiencing no pain. Associated symptoms include congestion and coughing. Pertinent negatives include no chills, diaphoresis, ear pain, headaches, hoarse voice, neck pain, shortness of breath, sinus pressure, sneezing, sore throat or swollen glands. Past treatments include nasal decongestants (Flonase, Mucinex). The treatment provided mild relief.       Constitution: Positive for fatigue. Negative for chills, sweating and fever.   HENT:  Positive for congestion. Negative for ear pain, sinus pressure and sore throat.    Neck: Negative for neck pain.   Respiratory:  Positive for cough and sputum production. Negative for shortness of breath.    Skin:  Negative for erythema.   Allergic/Immunologic: Negative for sneezing.   Neurological:  Negative for headaches.      Objective:     Physical Exam   Constitutional: She is oriented to person, place, and time. She appears well-developed. She is cooperative.  Non-toxic appearance. She does not appear ill. No distress.   HENT:   Head: Normocephalic and atraumatic.   Ears:   Right Ear: Hearing, tympanic membrane, external ear and ear canal normal.   Left Ear: Hearing, tympanic membrane, external ear and ear canal normal.   Nose: Nose normal. No mucosal edema, " rhinorrhea or nasal deformity. No epistaxis. Right sinus exhibits no maxillary sinus tenderness and no frontal sinus tenderness. Left sinus exhibits no maxillary sinus tenderness and no frontal sinus tenderness.   Mouth/Throat: Uvula is midline, oropharynx is clear and moist and mucous membranes are normal. No trismus in the jaw. Normal dentition. No uvula swelling. No oropharyngeal exudate, posterior oropharyngeal edema or posterior oropharyngeal erythema.   Eyes: Conjunctivae, EOM and lids are normal. Pupils are equal, round, and reactive to light. Right eye exhibits no discharge. Left eye exhibits no discharge. No scleral icterus.   Neck: Trachea normal and phonation normal. Neck supple. No JVD present. No tracheal deviation present. No thyromegaly present. No edema present. No erythema present. No neck rigidity present.   Cardiovascular: Normal rate, regular rhythm, normal heart sounds and normal pulses.   No murmur heard.Exam reveals no gallop and no friction rub.   Pulmonary/Chest: Effort normal and breath sounds normal. No stridor. No respiratory distress. She has no decreased breath sounds. She has no wheezes. She has no rhonchi. She has no rales. She exhibits no tenderness.   Abdominal: Normal appearance. She exhibits no distension. Soft. There is no abdominal tenderness. There is no rebound and no guarding.   Musculoskeletal: Normal range of motion.         General: No deformity. Normal range of motion.   Neurological: She is alert and oriented to person, place, and time. She exhibits normal muscle tone. Coordination normal.   Skin: Skin is warm, dry, intact, not diaphoretic, not pale and no rash. Capillary refill takes less than 2 seconds. No erythema   Psychiatric: Her speech is normal and behavior is normal. Judgment and thought content normal.   Nursing note and vitals reviewed.    Results for orders placed or performed in visit on 07/26/25   SARS Coronavirus 2 Antigen, POCT Manual Read    Collection  Time: 07/26/25 12:50 PM   Result Value Ref Range    SARS Coronavirus 2 Antigen Negative Negative, Presumptive Negative     Acceptable Yes     No results found.     Assessment:     1. Acute bacterial bronchitis    2. Nasal congestion    3. Cough, unspecified type        Plan:       Acute bacterial bronchitis  -     albuterol (VENTOLIN HFA) 90 mcg/actuation inhaler; Inhale 2 puffs into the lungs every 4 (four) hours as needed for Wheezing or Shortness of Breath (COUGH). Rescue  Dispense: 18 g; Refill: 0  -     dextromethorphan-guaiFENesin (MUCINEX DM) 60-1,200 mg per 12 hr tablet; Take 1 tablet by mouth every 12 (twelve) hours. for 10 days  Dispense: 20 tablet; Refill: 0  -     doxycycline (VIBRAMYCIN) 100 MG Cap; Take 1 capsule (100 mg total) by mouth 2 (two) times daily with meals. for 10 days  Dispense: 20 capsule; Refill: 0    Nasal congestion  -     SARS Coronavirus 2 Antigen, POCT Manual Read  -     predniSONE (DELTASONE) 20 MG tablet; Take 1 tablet (20 mg total) by mouth once daily. for 3 days  Dispense: 3 tablet; Refill: 0  -     dextromethorphan-guaiFENesin (MUCINEX DM) 60-1,200 mg per 12 hr tablet; Take 1 tablet by mouth every 12 (twelve) hours. for 10 days  Dispense: 20 tablet; Refill: 0    Cough, unspecified type  -     albuterol (VENTOLIN HFA) 90 mcg/actuation inhaler; Inhale 2 puffs into the lungs every 4 (four) hours as needed for Wheezing or Shortness of Breath (COUGH). Rescue  Dispense: 18 g; Refill: 0  -     predniSONE (DELTASONE) 20 MG tablet; Take 1 tablet (20 mg total) by mouth once daily. for 3 days  Dispense: 3 tablet; Refill: 0  -     dextromethorphan-guaiFENesin (MUCINEX DM) 60-1,200 mg per 12 hr tablet; Take 1 tablet by mouth every 12 (twelve) hours. for 10 days  Dispense: 20 tablet; Refill: 0      No follow-ups on file.   Patient Instructions   Patient Education       Upper Respiratory Infection ED   General Information   You came to the Emergency Department (ED) for an upper  respiratory infection or URI. A URI can affect your nose, throat, ears, and sinuses. A virus is the cause of almost all URIs and antibiotics will not help you feel better more quickly. The common cold is an example of a viral URI.  URIs are easy to spread from person to person, most often through coughing or sneezing. A URI will almost always get better in a week or two without any treatment.  What care is needed at home?   Call your regular doctor to let them know you were in the ED. Make a follow-up appointment if you were told to.  If you smoke, try to quit. Your doctor or nurse can help.  Drink lots of fluids like water, juice, or broth. This will help replace any fluids lost if you have a runny nose or fever. Warm tea or soup can help soothe a sore throat.  If the air in your home feels dry, use a cool mist humidifier. This can help a stuffy nose and make it easier to breathe.  You can also use saline nose drops to relieve stuffiness.  If you decide to take over-the-counter cough or cold medicines, follow the directions on the label carefully. Be sure you do not take more than 1 medicine that contains acetaminophen. Also, if you have a heart problem or high blood pressure, check with your doctor before you take any of these medicines.  Wash your hands often. Cough or sneeze into a tissue or your elbow instead of your hands. This will help keep others healthy.  When do I need to get emergency help?   Return to the ED if:   You have trouble breathing when talking or sitting still.  When do I need to call the doctor?   You have a fever of 100.4°F (38°C) or higher for several days, chills, a very bad sore throat, or ear or sinus pain.  You develop a new fever after several days of feeling the same or improving.  You develop chest pain when you cough.  You have a cough that lasts more than 10 days.  You cough up blood, or the color of the mucus you cough up changes.  You have new or worsening symptoms.  Last  Reviewed Date   2020-09-25  Consumer Information Use and Disclaimer   This information is not specific medical advice and does not replace information you receive from your health care provider. This is only a brief summary of general information. It does NOT include all information about conditions, illnesses, injuries, tests, procedures, treatments, therapies, discharge instructions or life-style choices that may apply to you. You must talk with your health care provider for complete information about your health and treatment options. This information should not be used to decide whether or not to accept your health care providers advice, instructions or recommendations. Only your health care provider has the knowledge and training to provide advice that is right for you.  Copyright   Copyright © 2021 [employer] and its affiliates and/or licensors. All rights reserved.

## 2025-08-07 DIAGNOSIS — I10 PRIMARY HYPERTENSION: ICD-10-CM

## 2025-08-07 RX ORDER — CARVEDILOL 6.25 MG/1
6.25 TABLET ORAL 2 TIMES DAILY
Qty: 180 TABLET | Refills: 3 | Status: SHIPPED | OUTPATIENT
Start: 2025-08-07

## 2025-08-07 RX ORDER — AMLODIPINE BESYLATE 10 MG/1
10 TABLET ORAL
Qty: 90 TABLET | Refills: 3 | Status: SHIPPED | OUTPATIENT
Start: 2025-08-07

## 2025-08-07 RX ORDER — POTASSIUM CHLORIDE 600 MG/1
CAPSULE ORAL
Qty: 90 CAPSULE | Refills: 3 | Status: SHIPPED | OUTPATIENT
Start: 2025-08-07

## 2025-08-07 RX ORDER — TRIAMTERENE AND HYDROCHLOROTHIAZIDE 37.5; 25 MG/1; MG/1
1 TABLET ORAL
Qty: 90 TABLET | Refills: 3 | Status: SHIPPED | OUTPATIENT
Start: 2025-08-07

## 2025-08-07 RX ORDER — CARVEDILOL 6.25 MG/1
6.25 TABLET ORAL
Qty: 180 TABLET | Refills: 3 | Status: SHIPPED | OUTPATIENT
Start: 2025-08-07 | End: 2025-08-07 | Stop reason: SDUPTHER

## 2025-08-07 NOTE — TELEPHONE ENCOUNTER
Refill Routing Note   Medication(s) are not appropriate for processing by [facility] for the following reason(s):        No active prescription written by provider    [facility] action(s):  Defer               Appointments  past 12m or future 3m with PCP    Date Provider   Last Visit   6/30/2025 Taylor Martinez MD   Next Visit   10/10/2025 Taylor Martinez MD   ED visits in past 90 days: 0        Note composed:1:27 PM 08/07/2025

## (undated) DEVICE — SHIELD COLLAGEN 12HR CORNEAL

## (undated) DEVICE — SHIELD EYE PLASTIC 3100G

## (undated) DEVICE — KIT GREY EYE

## (undated) DEVICE — DRAPE STERI INCISE MED 130X130

## (undated) DEVICE — NDL FLTR 5MCRN BLNT TIP 18GX1

## (undated) DEVICE — GOWN SURGICAL X-LARGE

## (undated) DEVICE — SOL BETADINE 5%

## (undated) DEVICE — DRESSING EYE OVAL LF

## (undated) DEVICE — DRAPE OPHTHALMIC 48X62 FEN

## (undated) DEVICE — SEE MEDLINE ITEM 157131

## (undated) DEVICE — GARTER EYE ADULT

## (undated) DEVICE — SOL WATER STRL IRR 1000ML